# Patient Record
Sex: MALE | Race: WHITE | NOT HISPANIC OR LATINO | Employment: FULL TIME | URBAN - METROPOLITAN AREA
[De-identification: names, ages, dates, MRNs, and addresses within clinical notes are randomized per-mention and may not be internally consistent; named-entity substitution may affect disease eponyms.]

---

## 2018-01-23 ENCOUNTER — ALLSCRIPTS OFFICE VISIT (OUTPATIENT)
Dept: OTHER | Facility: OTHER | Age: 58
End: 2018-01-23

## 2018-01-25 NOTE — PROGRESS NOTES
Assessment   1  Sinus congestion (032 27) (H89 67)    Plan   Sinus congestion    · Flonase Allergy Relief 50 MCG/ACT Nasal Suspension (Fluticasone Propionate);    instill 2 sprays into each nostril once daily    Discussion/Summary      Patient is a 73-year-old male here for sinus congestion and headache times 5 days  congestion:Likely due to allergies versus viral etiology  Physical exam was benign except mildly erythematous and edematous nasal turbinates  Will start patient on Flonase, which was prescribed during this visit  Advised patient he could use steam therapy or Neti pot with saline to irrigate nasal cavity as well  RTO in 1 week  Also advised patient to scheduled an annual exam as he is just reestablishing care  The patient was counseled regarding risk factor reductions,-- patient and family education,-- importance of compliance with treatment  The treatment plan was reviewed with the patient/guardian  The patient/guardian understands and agrees with the treatment plan       Self Referrals: No      Chief Complaint   Patient presents with a sinus infection  History of Present Illness   HPI: Patient is a 73-year-old male here to re-establish care and sinus congestion  per patient, he has had symptoms for 5 days  Also complains of frontal headaches which feels pressure-like and yellowish nasal discharge  Denies any fever, sore throat, cough, runny nose, nausea, vomiting, diarrhea  Review of Systems        Constitutional: as noted in HPI,-- no fever-- and-- no chills  ENT: nasal discharge, but-- as noted in HPI,-- no earache-- and-- no sore throat  Cardiovascular: no chest pain  Respiratory: no shortness of breath,-- no cough-- and-- no wheezing  Gastrointestinal: as noted in HPI,-- no abdominal pain,-- no nausea,-- no vomiting-- and-- no diarrhea  Musculoskeletal: no myalgias  Integumentary: no rashes  Neurological: headache  ROS reviewed  Active Problems   1  Sinus congestion (478 19) (R09 81)    Current Meds    1  Restasis 0 05 % Ophthalmic Emulsion; INSTILL 1 DROP IN BOTH EYES EVERY 12     HOURS DAILY; Therapy: ([phone]) to Recorded     The medication list was reviewed and updated today  Allergies   1  No Known Drug Allergies  2  No Known Environmental Allergies    Vitals    Recorded: [id number] 09:16AM   Temperature 98 3 F   Heart Rate 681   Systolic 137   Diastolic 80   Height 5 ft 9 25 in   Weight 212 lb    BMI Calculated 31 08   BSA Calculated 2 12   O2 Saturation 94     Physical Exam        Constitutional      General appearance: No acute distress, well appearing and well nourished  Head and Face      Palpation of the face and sinuses: No sinus tenderness  Eyes      Conjunctiva and lids: No erythema, swelling or discharge  Pupils and irises: Equal, round, reactive to light  Ears, Nose, Mouth, and Throat      Otoscopic examination: Tympanic membranes translucent with normal light reflex  Canals patent without erythema  -- mild erythematous and edematous nasal turbinates  Lips, teeth, and gums: Normal, good dentition  Oropharynx: Normal with no erythema, edema, exudate or lesions  Neck      Neck: Supple, symmetric, trachea midline, no masses  Pulmonary      Auscultation of lungs: Clear to auscultation  Cardiovascular      Auscultation of heart: Normal rate and rhythm, normal S1 and S2, no murmurs  Abdomen      Abdomen: Non-tender, no masses  Musculoskeletal      Inspection/palpation of digits and nails: Normal without clubbing or cyanosis  Skin      Skin and subcutaneous tissue: Normal without rashes or lesions         Psychiatric      Mood and affect: Normal        Attending Note   Attending Note Eliz Conley: Attending Note: I did not interview and examine the patient,-- I supervised the Resident-- and-- I agree with the Resident management plan as it was presented to me  Level of Participation: I was present in clinic, but did not examine the patient  I agree with the Resident's note  Signatures    Electronically signed by : Zurdo Merida MD; Jan 24 2018  6:40PM EST                       (Author)     Electronically signed by :  SHAYNE Liu ; Jan 24 2018  7:29PM EST                       (Co-author)

## 2020-11-21 ENCOUNTER — OFFICE VISIT (OUTPATIENT)
Dept: URGENT CARE | Facility: CLINIC | Age: 60
End: 2020-11-21
Payer: COMMERCIAL

## 2020-11-21 VITALS
HEIGHT: 69 IN | WEIGHT: 204 LBS | SYSTOLIC BLOOD PRESSURE: 145 MMHG | DIASTOLIC BLOOD PRESSURE: 91 MMHG | OXYGEN SATURATION: 99 % | BODY MASS INDEX: 30.21 KG/M2 | TEMPERATURE: 97.9 F | HEART RATE: 85 BPM | RESPIRATION RATE: 18 BRPM

## 2020-11-21 DIAGNOSIS — R07.9 CHEST PAIN, UNSPECIFIED TYPE: Primary | ICD-10-CM

## 2020-11-21 PROCEDURE — 99213 OFFICE O/P EST LOW 20 MIN: CPT | Performed by: PHYSICIAN ASSISTANT

## 2020-11-21 RX ORDER — CYCLOSPORINE 0.5 MG/ML
1 EMULSION OPHTHALMIC 2 TIMES DAILY
COMMUNITY
End: 2020-11-24 | Stop reason: HOSPADM

## 2020-11-22 ENCOUNTER — HOSPITAL ENCOUNTER (OUTPATIENT)
Facility: HOSPITAL | Age: 60
Setting detail: OBSERVATION
End: 2020-11-24
Attending: EMERGENCY MEDICINE | Admitting: FAMILY MEDICINE
Payer: COMMERCIAL

## 2020-11-22 ENCOUNTER — APPOINTMENT (OUTPATIENT)
Dept: RADIOLOGY | Facility: HOSPITAL | Age: 60
End: 2020-11-22
Payer: COMMERCIAL

## 2020-11-22 ENCOUNTER — APPOINTMENT (EMERGENCY)
Dept: RADIOLOGY | Facility: HOSPITAL | Age: 60
End: 2020-11-22
Payer: COMMERCIAL

## 2020-11-22 DIAGNOSIS — R77.8 ELEVATED TROPONIN: ICD-10-CM

## 2020-11-22 DIAGNOSIS — I21.4 NSTEMI (NON-ST ELEVATION MYOCARDIAL INFARCTION) (HCC): ICD-10-CM

## 2020-11-22 DIAGNOSIS — R07.9 CHEST PAIN: Primary | ICD-10-CM

## 2020-11-22 PROBLEM — E78.5 HYPERLIPIDEMIA: Status: ACTIVE | Noted: 2020-11-22

## 2020-11-22 LAB
ALBUMIN SERPL BCP-MCNC: 3.8 G/DL (ref 3.5–5)
ALP SERPL-CCNC: 58 U/L (ref 46–116)
ALT SERPL W P-5'-P-CCNC: 57 U/L (ref 12–78)
ANION GAP SERPL CALCULATED.3IONS-SCNC: 9 MMOL/L (ref 4–13)
AST SERPL W P-5'-P-CCNC: 23 U/L (ref 5–45)
BASOPHILS # BLD AUTO: 0.08 THOUSANDS/ΜL (ref 0–0.1)
BASOPHILS NFR BLD AUTO: 1 % (ref 0–1)
BILIRUB SERPL-MCNC: 0.5 MG/DL (ref 0.2–1)
BUN SERPL-MCNC: 12 MG/DL (ref 5–25)
CALCIUM SERPL-MCNC: 8.9 MG/DL (ref 8.3–10.1)
CHLORIDE SERPL-SCNC: 103 MMOL/L (ref 100–108)
CO2 SERPL-SCNC: 29 MMOL/L (ref 21–32)
CREAT SERPL-MCNC: 1.16 MG/DL (ref 0.6–1.3)
EOSINOPHIL # BLD AUTO: 0.12 THOUSAND/ΜL (ref 0–0.61)
EOSINOPHIL NFR BLD AUTO: 1 % (ref 0–6)
ERYTHROCYTE [DISTWIDTH] IN BLOOD BY AUTOMATED COUNT: 12.3 % (ref 11.6–15.1)
GFR SERPL CREATININE-BSD FRML MDRD: 70 ML/MIN/1.73SQ M
GLUCOSE SERPL-MCNC: 125 MG/DL (ref 65–140)
HCT VFR BLD AUTO: 48.1 % (ref 36.5–49.3)
HGB BLD-MCNC: 16.2 G/DL (ref 12–17)
IMM GRANULOCYTES # BLD AUTO: 0.04 THOUSAND/UL (ref 0–0.2)
IMM GRANULOCYTES NFR BLD AUTO: 1 % (ref 0–2)
LYMPHOCYTES # BLD AUTO: 2.48 THOUSANDS/ΜL (ref 0.6–4.47)
LYMPHOCYTES NFR BLD AUTO: 29 % (ref 14–44)
MCH RBC QN AUTO: 30.6 PG (ref 26.8–34.3)
MCHC RBC AUTO-ENTMCNC: 33.7 G/DL (ref 31.4–37.4)
MCV RBC AUTO: 91 FL (ref 82–98)
MONOCYTES # BLD AUTO: 0.92 THOUSAND/ΜL (ref 0.17–1.22)
MONOCYTES NFR BLD AUTO: 11 % (ref 4–12)
NEUTROPHILS # BLD AUTO: 5.04 THOUSANDS/ΜL (ref 1.85–7.62)
NEUTS SEG NFR BLD AUTO: 57 % (ref 43–75)
NRBC BLD AUTO-RTO: 0 /100 WBCS
NT-PROBNP SERPL-MCNC: 231 PG/ML
PLATELET # BLD AUTO: 314 THOUSANDS/UL (ref 149–390)
PMV BLD AUTO: 9.2 FL (ref 8.9–12.7)
POTASSIUM SERPL-SCNC: 3.6 MMOL/L (ref 3.5–5.3)
PROT SERPL-MCNC: 7.3 G/DL (ref 6.4–8.2)
RBC # BLD AUTO: 5.3 MILLION/UL (ref 3.88–5.62)
SODIUM SERPL-SCNC: 141 MMOL/L (ref 136–145)
TROPONIN I SERPL-MCNC: 0.08 NG/ML
TROPONIN I SERPL-MCNC: 0.16 NG/ML
WBC # BLD AUTO: 8.68 THOUSAND/UL (ref 4.31–10.16)

## 2020-11-22 PROCEDURE — 83036 HEMOGLOBIN GLYCOSYLATED A1C: CPT | Performed by: STUDENT IN AN ORGANIZED HEALTH CARE EDUCATION/TRAINING PROGRAM

## 2020-11-22 PROCEDURE — 80053 COMPREHEN METABOLIC PANEL: CPT

## 2020-11-22 PROCEDURE — 71275 CT ANGIOGRAPHY CHEST: CPT

## 2020-11-22 PROCEDURE — 93005 ELECTROCARDIOGRAM TRACING: CPT

## 2020-11-22 PROCEDURE — 84484 ASSAY OF TROPONIN QUANT: CPT

## 2020-11-22 PROCEDURE — G1004 CDSM NDSC: HCPCS

## 2020-11-22 PROCEDURE — 99285 EMERGENCY DEPT VISIT HI MDM: CPT

## 2020-11-22 PROCEDURE — 84484 ASSAY OF TROPONIN QUANT: CPT | Performed by: EMERGENCY MEDICINE

## 2020-11-22 PROCEDURE — 85025 COMPLETE CBC W/AUTO DIFF WBC: CPT

## 2020-11-22 PROCEDURE — 74174 CTA ABD&PLVS W/CONTRAST: CPT

## 2020-11-22 PROCEDURE — 71045 X-RAY EXAM CHEST 1 VIEW: CPT

## 2020-11-22 PROCEDURE — 36415 COLL VENOUS BLD VENIPUNCTURE: CPT

## 2020-11-22 PROCEDURE — 99285 EMERGENCY DEPT VISIT HI MDM: CPT | Performed by: EMERGENCY MEDICINE

## 2020-11-22 PROCEDURE — 83880 ASSAY OF NATRIURETIC PEPTIDE: CPT

## 2020-11-22 RX ORDER — ASPIRIN 81 MG/1
324 TABLET, CHEWABLE ORAL ONCE
Status: COMPLETED | OUTPATIENT
Start: 2020-11-22 | End: 2020-11-22

## 2020-11-22 RX ORDER — NITROGLYCERIN 0.4 MG/1
0.4 TABLET SUBLINGUAL
Status: COMPLETED | OUTPATIENT
Start: 2020-11-22 | End: 2020-11-23

## 2020-11-22 RX ORDER — ASPIRIN 81 MG/1
81 TABLET, CHEWABLE ORAL DAILY
COMMUNITY
End: 2020-11-24 | Stop reason: HOSPADM

## 2020-11-22 RX ADMIN — IOHEXOL 100 ML: 350 INJECTION, SOLUTION INTRAVENOUS at 22:34

## 2020-11-22 RX ADMIN — ASPIRIN 81 MG CHEWABLE TABLET 324 MG: 81 TABLET CHEWABLE at 19:37

## 2020-11-22 RX ADMIN — NITROGLYCERIN 0.4 MG: 0.4 TABLET SUBLINGUAL at 19:39

## 2020-11-22 RX ADMIN — NITROGLYCERIN 1 INCH: 20 OINTMENT TOPICAL at 20:08

## 2020-11-23 ENCOUNTER — APPOINTMENT (OUTPATIENT)
Dept: NON INVASIVE DIAGNOSTICS | Facility: HOSPITAL | Age: 60
End: 2020-11-23
Payer: COMMERCIAL

## 2020-11-23 LAB
ALBUMIN SERPL BCP-MCNC: 3.3 G/DL (ref 3.5–5)
ALP SERPL-CCNC: 49 U/L (ref 46–116)
ALT SERPL W P-5'-P-CCNC: 42 U/L (ref 12–78)
ANION GAP SERPL CALCULATED.3IONS-SCNC: 8 MMOL/L (ref 4–13)
AST SERPL W P-5'-P-CCNC: 21 U/L (ref 5–45)
ATRIAL RATE: 67 BPM
ATRIAL RATE: 90 BPM
BILIRUB SERPL-MCNC: 0.7 MG/DL (ref 0.2–1)
BUN SERPL-MCNC: 10 MG/DL (ref 5–25)
CALCIUM ALBUM COR SERPL-MCNC: 9.2 MG/DL (ref 8.3–10.1)
CALCIUM SERPL-MCNC: 8.6 MG/DL (ref 8.3–10.1)
CHLORIDE SERPL-SCNC: 105 MMOL/L (ref 100–108)
CHOLEST SERPL-MCNC: 252 MG/DL (ref 50–200)
CO2 SERPL-SCNC: 26 MMOL/L (ref 21–32)
CREAT SERPL-MCNC: 1.04 MG/DL (ref 0.6–1.3)
ERYTHROCYTE [DISTWIDTH] IN BLOOD BY AUTOMATED COUNT: 12.5 % (ref 11.6–15.1)
EST. AVERAGE GLUCOSE BLD GHB EST-MCNC: 117 MG/DL
FLUAV RNA RESP QL NAA+PROBE: NEGATIVE
FLUBV RNA RESP QL NAA+PROBE: NEGATIVE
GFR SERPL CREATININE-BSD FRML MDRD: 79 ML/MIN/1.73SQ M
GLUCOSE P FAST SERPL-MCNC: 119 MG/DL (ref 65–99)
GLUCOSE SERPL-MCNC: 119 MG/DL (ref 65–140)
HBA1C MFR BLD: 5.7 %
HCT VFR BLD AUTO: 44.1 % (ref 36.5–49.3)
HDLC SERPL-MCNC: 43 MG/DL
HGB BLD-MCNC: 14.8 G/DL (ref 12–17)
LDLC SERPL CALC-MCNC: 166 MG/DL (ref 0–100)
MAGNESIUM SERPL-MCNC: 2.2 MG/DL (ref 1.6–2.6)
MCH RBC QN AUTO: 30.8 PG (ref 26.8–34.3)
MCHC RBC AUTO-ENTMCNC: 33.6 G/DL (ref 31.4–37.4)
MCV RBC AUTO: 92 FL (ref 82–98)
NONHDLC SERPL-MCNC: 209 MG/DL
P AXIS: 61 DEGREES
P AXIS: 63 DEGREES
PHOSPHATE SERPL-MCNC: 3.3 MG/DL (ref 2.7–4.5)
PLATELET # BLD AUTO: 296 THOUSANDS/UL (ref 149–390)
PLATELET # BLD AUTO: 302 THOUSANDS/UL (ref 149–390)
PMV BLD AUTO: 9.1 FL (ref 8.9–12.7)
PMV BLD AUTO: 9.4 FL (ref 8.9–12.7)
POTASSIUM SERPL-SCNC: 3.8 MMOL/L (ref 3.5–5.3)
PR INTERVAL: 154 MS
PR INTERVAL: 158 MS
PROT SERPL-MCNC: 6.5 G/DL (ref 6.4–8.2)
QRS AXIS: -13 DEGREES
QRS AXIS: 4 DEGREES
QRSD INTERVAL: 110 MS
QRSD INTERVAL: 110 MS
QT INTERVAL: 364 MS
QT INTERVAL: 410 MS
QTC INTERVAL: 433 MS
QTC INTERVAL: 445 MS
RBC # BLD AUTO: 4.8 MILLION/UL (ref 3.88–5.62)
RSV RNA RESP QL NAA+PROBE: NEGATIVE
SARS-COV-2 RNA RESP QL NAA+PROBE: NEGATIVE
SODIUM SERPL-SCNC: 139 MMOL/L (ref 136–145)
T WAVE AXIS: -26 DEGREES
T WAVE AXIS: -32 DEGREES
TRIGL SERPL-MCNC: 214 MG/DL
TROPONIN I SERPL-MCNC: 0.07 NG/ML
TROPONIN I SERPL-MCNC: 0.11 NG/ML
TROPONIN I SERPL-MCNC: 0.16 NG/ML
TSH SERPL DL<=0.05 MIU/L-ACNC: 3.6 UIU/ML (ref 0.36–3.74)
VENTRICULAR RATE: 67 BPM
VENTRICULAR RATE: 90 BPM
WBC # BLD AUTO: 10.23 THOUSAND/UL (ref 4.31–10.16)

## 2020-11-23 PROCEDURE — 93005 ELECTROCARDIOGRAM TRACING: CPT

## 2020-11-23 PROCEDURE — 99203 OFFICE O/P NEW LOW 30 MIN: CPT | Performed by: INTERNAL MEDICINE

## 2020-11-23 PROCEDURE — 93010 ELECTROCARDIOGRAM REPORT: CPT | Performed by: INTERNAL MEDICINE

## 2020-11-23 PROCEDURE — NC001 PR NO CHARGE: Performed by: FAMILY MEDICINE

## 2020-11-23 PROCEDURE — 93306 TTE W/DOPPLER COMPLETE: CPT

## 2020-11-23 PROCEDURE — 0241U HB NFCT DS VIR RESP RNA 4 TRGT: CPT | Performed by: INTERNAL MEDICINE

## 2020-11-23 PROCEDURE — 84100 ASSAY OF PHOSPHORUS: CPT | Performed by: STUDENT IN AN ORGANIZED HEALTH CARE EDUCATION/TRAINING PROGRAM

## 2020-11-23 PROCEDURE — 99152 MOD SED SAME PHYS/QHP 5/>YRS: CPT | Performed by: INTERNAL MEDICINE

## 2020-11-23 PROCEDURE — 84443 ASSAY THYROID STIM HORMONE: CPT | Performed by: STUDENT IN AN ORGANIZED HEALTH CARE EDUCATION/TRAINING PROGRAM

## 2020-11-23 PROCEDURE — 84484 ASSAY OF TROPONIN QUANT: CPT | Performed by: GENERAL PRACTICE

## 2020-11-23 PROCEDURE — C1769 GUIDE WIRE: HCPCS

## 2020-11-23 PROCEDURE — 80061 LIPID PANEL: CPT | Performed by: STUDENT IN AN ORGANIZED HEALTH CARE EDUCATION/TRAINING PROGRAM

## 2020-11-23 PROCEDURE — 80053 COMPREHEN METABOLIC PANEL: CPT | Performed by: STUDENT IN AN ORGANIZED HEALTH CARE EDUCATION/TRAINING PROGRAM

## 2020-11-23 PROCEDURE — C1894 INTRO/SHEATH, NON-LASER: HCPCS

## 2020-11-23 PROCEDURE — 84484 ASSAY OF TROPONIN QUANT: CPT | Performed by: FAMILY MEDICINE

## 2020-11-23 PROCEDURE — 93458 L HRT ARTERY/VENTRICLE ANGIO: CPT

## 2020-11-23 PROCEDURE — 83735 ASSAY OF MAGNESIUM: CPT | Performed by: STUDENT IN AN ORGANIZED HEALTH CARE EDUCATION/TRAINING PROGRAM

## 2020-11-23 PROCEDURE — 93458 L HRT ARTERY/VENTRICLE ANGIO: CPT | Performed by: INTERNAL MEDICINE

## 2020-11-23 PROCEDURE — 85049 AUTOMATED PLATELET COUNT: CPT | Performed by: STUDENT IN AN ORGANIZED HEALTH CARE EDUCATION/TRAINING PROGRAM

## 2020-11-23 PROCEDURE — 99219 PR INITIAL OBSERVATION CARE/DAY 50 MINUTES: CPT | Performed by: FAMILY MEDICINE

## 2020-11-23 PROCEDURE — 93306 TTE W/DOPPLER COMPLETE: CPT | Performed by: INTERNAL MEDICINE

## 2020-11-23 PROCEDURE — 85027 COMPLETE CBC AUTOMATED: CPT | Performed by: STUDENT IN AN ORGANIZED HEALTH CARE EDUCATION/TRAINING PROGRAM

## 2020-11-23 PROCEDURE — 84484 ASSAY OF TROPONIN QUANT: CPT | Performed by: STUDENT IN AN ORGANIZED HEALTH CARE EDUCATION/TRAINING PROGRAM

## 2020-11-23 RX ORDER — ACETAMINOPHEN 325 MG/1
650 TABLET ORAL EVERY 6 HOURS PRN
Status: DISCONTINUED | OUTPATIENT
Start: 2020-11-23 | End: 2020-11-23

## 2020-11-23 RX ORDER — HEPARIN SODIUM 1000 [USP'U]/ML
INJECTION, SOLUTION INTRAVENOUS; SUBCUTANEOUS CODE/TRAUMA/SEDATION MEDICATION
Status: COMPLETED | OUTPATIENT
Start: 2020-11-23 | End: 2020-11-23

## 2020-11-23 RX ORDER — ACETAMINOPHEN 325 MG/1
650 TABLET ORAL EVERY 6 HOURS PRN
Status: DISCONTINUED | OUTPATIENT
Start: 2020-11-23 | End: 2020-11-24 | Stop reason: HOSPADM

## 2020-11-23 RX ORDER — ATORVASTATIN CALCIUM 40 MG/1
40 TABLET, FILM COATED ORAL
Status: DISCONTINUED | OUTPATIENT
Start: 2020-11-23 | End: 2020-11-23

## 2020-11-23 RX ORDER — NITROGLYCERIN 0.4 MG/1
0.4 TABLET SUBLINGUAL
Status: COMPLETED | OUTPATIENT
Start: 2020-11-23 | End: 2020-11-23

## 2020-11-23 RX ORDER — POTASSIUM CHLORIDE 20 MEQ/1
20 TABLET, EXTENDED RELEASE ORAL ONCE
Status: COMPLETED | OUTPATIENT
Start: 2020-11-23 | End: 2020-11-23

## 2020-11-23 RX ORDER — FENTANYL CITRATE 50 UG/ML
INJECTION, SOLUTION INTRAMUSCULAR; INTRAVENOUS CODE/TRAUMA/SEDATION MEDICATION
Status: COMPLETED | OUTPATIENT
Start: 2020-11-23 | End: 2020-11-23

## 2020-11-23 RX ORDER — SODIUM CHLORIDE 9 MG/ML
75 INJECTION, SOLUTION INTRAVENOUS CONTINUOUS
Status: DISCONTINUED | OUTPATIENT
Start: 2020-11-23 | End: 2020-11-24 | Stop reason: HOSPADM

## 2020-11-23 RX ORDER — NITROGLYCERIN 20 MG/100ML
INJECTION INTRAVENOUS CODE/TRAUMA/SEDATION MEDICATION
Status: COMPLETED | OUTPATIENT
Start: 2020-11-23 | End: 2020-11-23

## 2020-11-23 RX ORDER — ASPIRIN 81 MG/1
81 TABLET ORAL DAILY
Status: DISCONTINUED | OUTPATIENT
Start: 2020-11-23 | End: 2020-11-24 | Stop reason: HOSPADM

## 2020-11-23 RX ORDER — MIDAZOLAM HYDROCHLORIDE 2 MG/2ML
INJECTION, SOLUTION INTRAMUSCULAR; INTRAVENOUS CODE/TRAUMA/SEDATION MEDICATION
Status: COMPLETED | OUTPATIENT
Start: 2020-11-23 | End: 2020-11-23

## 2020-11-23 RX ORDER — PRASUGREL 10 MG/1
10 TABLET, FILM COATED ORAL DAILY
Status: DISCONTINUED | OUTPATIENT
Start: 2020-11-24 | End: 2020-11-24 | Stop reason: HOSPADM

## 2020-11-23 RX ORDER — VERAPAMIL HCL 2.5 MG/ML
AMPUL (ML) INTRAVENOUS CODE/TRAUMA/SEDATION MEDICATION
Status: COMPLETED | OUTPATIENT
Start: 2020-11-23 | End: 2020-11-23

## 2020-11-23 RX ORDER — PRASUGREL 10 MG/1
TABLET, FILM COATED ORAL CODE/TRAUMA/SEDATION MEDICATION
Status: COMPLETED | OUTPATIENT
Start: 2020-11-23 | End: 2020-11-23

## 2020-11-23 RX ORDER — LIDOCAINE HYDROCHLORIDE 20 MG/ML
INJECTION, SOLUTION INFILTRATION; PERINEURAL CODE/TRAUMA/SEDATION MEDICATION
Status: COMPLETED | OUTPATIENT
Start: 2020-11-23 | End: 2020-11-23

## 2020-11-23 RX ORDER — ATORVASTATIN CALCIUM 80 MG/1
80 TABLET, FILM COATED ORAL
Status: DISCONTINUED | OUTPATIENT
Start: 2020-11-23 | End: 2020-11-24 | Stop reason: HOSPADM

## 2020-11-23 RX ADMIN — ASPIRIN 81 MG: 81 TABLET, DELAYED RELEASE ORAL at 11:04

## 2020-11-23 RX ADMIN — Medication 200 MCG: at 14:38

## 2020-11-23 RX ADMIN — ENOXAPARIN SODIUM 40 MG: 40 INJECTION SUBCUTANEOUS at 09:34

## 2020-11-23 RX ADMIN — FENTANYL CITRATE 100 MCG: 50 INJECTION, SOLUTION INTRAMUSCULAR; INTRAVENOUS at 14:37

## 2020-11-23 RX ADMIN — METOPROLOL TARTRATE 25 MG: 25 TABLET, FILM COATED ORAL at 11:05

## 2020-11-23 RX ADMIN — NITROGLYCERIN 0.4 MG: 0.4 TABLET SUBLINGUAL at 11:17

## 2020-11-23 RX ADMIN — NITROGLYCERIN 0.4 MG: 0.4 TABLET SUBLINGUAL at 07:41

## 2020-11-23 RX ADMIN — LIDOCAINE HYDROCHLORIDE 1 ML: 20 INJECTION, SOLUTION INFILTRATION; PERINEURAL at 14:37

## 2020-11-23 RX ADMIN — ATORVASTATIN CALCIUM 80 MG: 80 TABLET ORAL at 17:08

## 2020-11-23 RX ADMIN — IOHEXOL 75 ML: 350 INJECTION, SOLUTION INTRAVENOUS at 15:01

## 2020-11-23 RX ADMIN — MIDAZOLAM 2 MG: 1 INJECTION INTRAMUSCULAR; INTRAVENOUS at 14:37

## 2020-11-23 RX ADMIN — POTASSIUM CHLORIDE 20 MEQ: 1500 TABLET, EXTENDED RELEASE ORAL at 07:41

## 2020-11-23 RX ADMIN — ACETAMINOPHEN 650 MG: 325 TABLET, FILM COATED ORAL at 21:46

## 2020-11-23 RX ADMIN — PRASUGREL HYDROCHLORIDE 60 MG: 10 TABLET, FILM COATED ORAL at 15:06

## 2020-11-23 RX ADMIN — METOPROLOL TARTRATE 25 MG: 25 TABLET, FILM COATED ORAL at 22:45

## 2020-11-23 RX ADMIN — NITROGLYCERIN 0.4 MG: 0.4 TABLET SUBLINGUAL at 21:20

## 2020-11-23 RX ADMIN — NITROGLYCERIN 0.4 MG: 0.4 TABLET SUBLINGUAL at 21:26

## 2020-11-23 RX ADMIN — SODIUM CHLORIDE 75 ML/HR: 0.9 INJECTION, SOLUTION INTRAVENOUS at 11:04

## 2020-11-23 RX ADMIN — ENOXAPARIN SODIUM 80 MG: 80 INJECTION SUBCUTANEOUS at 21:39

## 2020-11-23 RX ADMIN — NITROGLYCERIN 0.4 MG: 0.4 TABLET SUBLINGUAL at 13:41

## 2020-11-23 RX ADMIN — HEPARIN SODIUM 3000 UNITS: 1000 INJECTION, SOLUTION INTRAVENOUS; SUBCUTANEOUS at 14:38

## 2020-11-23 RX ADMIN — VERAPAMIL HYDROCHLORIDE 2.5 MG: 2.5 INJECTION, SOLUTION INTRAVENOUS at 14:38

## 2020-11-24 ENCOUNTER — HOSPITAL ENCOUNTER (INPATIENT)
Facility: HOSPITAL | Age: 60
LOS: 1 days | Discharge: HOME | DRG: 247 | End: 2020-11-25
Attending: INTERNAL MEDICINE | Admitting: INTERNAL MEDICINE
Payer: COMMERCIAL

## 2020-11-24 ENCOUNTER — APPOINTMENT (OUTPATIENT)
Dept: NON INVASIVE DIAGNOSTICS | Facility: HOSPITAL | Age: 60
DRG: 247 | End: 2020-11-24
Attending: INTERNAL MEDICINE
Payer: COMMERCIAL

## 2020-11-24 VITALS
WEIGHT: 206.8 LBS | RESPIRATION RATE: 19 BRPM | OXYGEN SATURATION: 98 % | TEMPERATURE: 98.3 F | BODY MASS INDEX: 30.63 KG/M2 | SYSTOLIC BLOOD PRESSURE: 145 MMHG | DIASTOLIC BLOOD PRESSURE: 99 MMHG | HEART RATE: 60 BPM | HEIGHT: 69 IN

## 2020-11-24 DIAGNOSIS — I21.4 NSTEMI (NON-ST ELEVATED MYOCARDIAL INFARCTION) (HCC): ICD-10-CM

## 2020-11-24 DIAGNOSIS — Z95.5 S/P CORONARY ARTERY STENT PLACEMENT: Primary | ICD-10-CM

## 2020-11-24 DIAGNOSIS — E78.2 MIXED HYPERLIPIDEMIA: ICD-10-CM

## 2020-11-24 PROBLEM — R03.0 ELEVATED BP WITHOUT DIAGNOSIS OF HYPERTENSION: Status: ACTIVE | Noted: 2020-11-24

## 2020-11-24 LAB
ALBUMIN SERPL BCP-MCNC: 3.2 G/DL (ref 3.5–5)
ALP SERPL-CCNC: 51 U/L (ref 46–116)
ALT SERPL W P-5'-P-CCNC: 42 U/L (ref 12–78)
ANION GAP SERPL CALCULATED.3IONS-SCNC: 8 MMOL/L (ref 4–13)
AST SERPL W P-5'-P-CCNC: 33 U/L (ref 5–45)
ATRIAL RATE: 78 BPM
ATRIAL RATE: 83 BPM
BASOPHILS # BLD AUTO: 0.07 THOUSANDS/ΜL (ref 0–0.1)
BASOPHILS NFR BLD AUTO: 1 % (ref 0–1)
BILIRUB SERPL-MCNC: 0.7 MG/DL (ref 0.2–1)
BUN SERPL-MCNC: 9 MG/DL (ref 5–25)
CALCIUM ALBUM COR SERPL-MCNC: 9 MG/DL (ref 8.3–10.1)
CALCIUM SERPL-MCNC: 8.4 MG/DL (ref 8.3–10.1)
CHLORIDE SERPL-SCNC: 106 MMOL/L (ref 100–108)
CHOLEST SERPL-MCNC: 243 MG/DL (ref 50–200)
CO2 SERPL-SCNC: 26 MMOL/L (ref 21–32)
CREAT SERPL-MCNC: 0.88 MG/DL (ref 0.6–1.3)
EOSINOPHIL # BLD AUTO: 0.12 THOUSAND/ΜL (ref 0–0.61)
EOSINOPHIL NFR BLD AUTO: 1 % (ref 0–6)
ERYTHROCYTE [DISTWIDTH] IN BLOOD BY AUTOMATED COUNT: 12.3 % (ref 11.6–15.1)
GFR SERPL CREATININE-BSD FRML MDRD: 95 ML/MIN/1.73SQ M
GLUCOSE SERPL-MCNC: 121 MG/DL (ref 65–140)
HCT VFR BLD AUTO: 45.3 % (ref 36.5–49.3)
HDLC SERPL-MCNC: 40 MG/DL
HGB BLD-MCNC: 15.2 G/DL (ref 12–17)
IMM GRANULOCYTES # BLD AUTO: 0.04 THOUSAND/UL (ref 0–0.2)
IMM GRANULOCYTES NFR BLD AUTO: 0 % (ref 0–2)
KCT BLD-ACNC: 293 SEC (ref 89–137)
LDLC SERPL CALC-MCNC: 158 MG/DL (ref 0–100)
LYMPHOCYTES # BLD AUTO: 2.1 THOUSANDS/ΜL (ref 0.6–4.47)
LYMPHOCYTES NFR BLD AUTO: 21 % (ref 14–44)
MAGNESIUM SERPL-MCNC: 2.1 MG/DL (ref 1.6–2.6)
MCH RBC QN AUTO: 30.7 PG (ref 26.8–34.3)
MCHC RBC AUTO-ENTMCNC: 33.6 G/DL (ref 31.4–37.4)
MCV RBC AUTO: 92 FL (ref 82–98)
MONOCYTES # BLD AUTO: 0.98 THOUSAND/ΜL (ref 0.17–1.22)
MONOCYTES NFR BLD AUTO: 10 % (ref 4–12)
NEUTROPHILS # BLD AUTO: 6.72 THOUSANDS/ΜL (ref 1.85–7.62)
NEUTS SEG NFR BLD AUTO: 67 % (ref 43–75)
NRBC BLD AUTO-RTO: 0 /100 WBCS
P AXIS: 50 DEGREES
P AXIS: 55 DEGREES
PHOSPHATE SERPL-MCNC: 3.1 MG/DL (ref 2.7–4.5)
PLATELET # BLD AUTO: 297 THOUSANDS/UL (ref 149–390)
PMV BLD AUTO: 9.3 FL (ref 8.9–12.7)
POTASSIUM SERPL-SCNC: 3.9 MMOL/L (ref 3.5–5.3)
PR INTERVAL: 158 MS
PR INTERVAL: 162 MS
PROT SERPL-MCNC: 6.5 G/DL (ref 6.4–8.2)
QRS AXIS: -13 DEGREES
QRS AXIS: -8 DEGREES
QRSD INTERVAL: 102 MS
QRSD INTERVAL: 108 MS
QT INTERVAL: 270 MS
QT INTERVAL: 378 MS
QTC INTERVAL: 307 MS
QTC INTERVAL: 444 MS
RBC # BLD AUTO: 4.95 MILLION/UL (ref 3.88–5.62)
SODIUM SERPL-SCNC: 140 MMOL/L (ref 136–145)
SPECIMEN SOURCE: ABNORMAL
T WAVE AXIS: -57 DEGREES
T WAVE AXIS: 31 DEGREES
TRIGL SERPL-MCNC: 227 MG/DL
VENTRICULAR RATE: 78 BPM
VENTRICULAR RATE: 83 BPM
WBC # BLD AUTO: 10.03 THOUSAND/UL (ref 4.31–10.16)

## 2020-11-24 PROCEDURE — C9600 PERC DRUG-EL COR STENT SING: HCPCS | Performed by: INTERNAL MEDICINE

## 2020-11-24 PROCEDURE — 85025 COMPLETE CBC W/AUTO DIFF WBC: CPT | Performed by: GENERAL PRACTICE

## 2020-11-24 PROCEDURE — C1894 INTRO/SHEATH, NON-LASER: HCPCS | Performed by: INTERNAL MEDICINE

## 2020-11-24 PROCEDURE — C1769 GUIDE WIRE: HCPCS | Performed by: INTERNAL MEDICINE

## 2020-11-24 PROCEDURE — 80061 LIPID PANEL: CPT | Performed by: GENERAL PRACTICE

## 2020-11-24 PROCEDURE — 99232 SBSQ HOSP IP/OBS MODERATE 35: CPT | Performed by: INTERNAL MEDICINE

## 2020-11-24 PROCEDURE — 93454 CORONARY ARTERY ANGIO S&I: CPT | Performed by: INTERNAL MEDICINE

## 2020-11-24 PROCEDURE — 84100 ASSAY OF PHOSPHORUS: CPT | Performed by: GENERAL PRACTICE

## 2020-11-24 PROCEDURE — C1887 CATHETER, GUIDING: HCPCS | Performed by: INTERNAL MEDICINE

## 2020-11-24 PROCEDURE — 027034Z DILATION OF CORONARY ARTERY, ONE ARTERY WITH DRUG-ELUTING INTRALUMINAL DEVICE, PERCUTANEOUS APPROACH: ICD-10-PCS | Performed by: INTERNAL MEDICINE

## 2020-11-24 PROCEDURE — C1874 STENT, COATED/COV W/DEL SYS: HCPCS

## 2020-11-24 PROCEDURE — B2101ZZ FLUOROSCOPY OF SINGLE CORONARY ARTERY USING LOW OSMOLAR CONTRAST: ICD-10-PCS | Performed by: INTERNAL MEDICINE

## 2020-11-24 PROCEDURE — 85347 COAGULATION TIME ACTIVATED: CPT

## 2020-11-24 PROCEDURE — 83735 ASSAY OF MAGNESIUM: CPT | Performed by: GENERAL PRACTICE

## 2020-11-24 PROCEDURE — 92928 PRQ TCAT PLMT NTRAC ST 1 LES: CPT | Performed by: INTERNAL MEDICINE

## 2020-11-24 PROCEDURE — 99152 MOD SED SAME PHYS/QHP 5/>YRS: CPT | Performed by: INTERNAL MEDICINE

## 2020-11-24 PROCEDURE — 99217 PR OBSERVATION CARE DISCHARGE MANAGEMENT: CPT | Performed by: FAMILY MEDICINE

## 2020-11-24 PROCEDURE — 80053 COMPREHEN METABOLIC PANEL: CPT | Performed by: GENERAL PRACTICE

## 2020-11-24 PROCEDURE — 93010 ELECTROCARDIOGRAM REPORT: CPT | Performed by: INTERNAL MEDICINE

## 2020-11-24 PROCEDURE — 99223 1ST HOSP IP/OBS HIGH 75: CPT | Performed by: INTERNAL MEDICINE

## 2020-11-24 RX ORDER — SODIUM CHLORIDE 9 MG/ML
INJECTION, SOLUTION INTRAVENOUS
Status: COMPLETED | OUTPATIENT
Start: 2020-11-24 | End: 2020-11-24

## 2020-11-24 RX ORDER — ATORVASTATIN CALCIUM 80 MG/1
80 TABLET, FILM COATED ORAL
Qty: 90 TABLET | Refills: 0 | Status: SHIPPED | OUTPATIENT
Start: 2020-11-24 | End: 2020-12-14 | Stop reason: SDUPTHER

## 2020-11-24 RX ORDER — PRASUGREL 10 MG/1
10 TABLET, FILM COATED ORAL DAILY
Qty: 30 TABLET | Refills: 0 | Status: SHIPPED | OUTPATIENT
Start: 2020-11-25 | End: 2020-12-14 | Stop reason: SDUPTHER

## 2020-11-24 RX ORDER — VERAPAMIL HYDROCHLORIDE 2.5 MG/ML
INJECTION, SOLUTION INTRAVENOUS CODE/TRAUMA/SEDATION MEDICATION
Status: COMPLETED | OUTPATIENT
Start: 2020-11-24 | End: 2020-11-24

## 2020-11-24 RX ORDER — ASPIRIN 81 MG/1
81 TABLET ORAL DAILY
Qty: 90 TABLET | Refills: 0 | Status: SHIPPED | OUTPATIENT
Start: 2020-11-25 | End: 2020-11-25 | Stop reason: HOSPADM

## 2020-11-24 RX ORDER — ASPIRIN 81 MG/1
TABLET, CHEWABLE ORAL CODE/TRAUMA/SEDATION MEDICATION
Status: COMPLETED | OUTPATIENT
Start: 2020-11-24 | End: 2020-11-24

## 2020-11-24 RX ORDER — ACETAMINOPHEN 325 MG/1
650 TABLET ORAL EVERY 6 HOURS PRN
Status: DISCONTINUED | OUTPATIENT
Start: 2020-11-24 | End: 2020-11-25 | Stop reason: HOSPADM

## 2020-11-24 RX ORDER — HEPARIN SODIUM 1000 [USP'U]/ML
INJECTION, SOLUTION INTRAVENOUS; SUBCUTANEOUS CODE/TRAUMA/SEDATION MEDICATION
Status: COMPLETED | OUTPATIENT
Start: 2020-11-24 | End: 2020-11-24

## 2020-11-24 RX ORDER — SODIUM CHLORIDE 9 MG/ML
75 INJECTION, SOLUTION INTRAVENOUS CONTINUOUS
Status: DISPENSED | OUTPATIENT
Start: 2020-11-24 | End: 2020-11-24

## 2020-11-24 RX ORDER — FENTANYL CITRATE 50 UG/ML
INJECTION, SOLUTION INTRAMUSCULAR; INTRAVENOUS CODE/TRAUMA/SEDATION MEDICATION
Status: COMPLETED | OUTPATIENT
Start: 2020-11-24 | End: 2020-11-24

## 2020-11-24 RX ORDER — ONDANSETRON 2 MG/ML
4 INJECTION INTRAMUSCULAR; INTRAVENOUS EVERY 6 HOURS PRN
Status: DISCONTINUED | OUTPATIENT
Start: 2020-11-24 | End: 2020-11-25 | Stop reason: HOSPADM

## 2020-11-24 RX ORDER — PRASUGREL 10 MG/1
10 TABLET, FILM COATED ORAL DAILY
Qty: 90 TABLET | Refills: 0 | Status: SHIPPED | OUTPATIENT
Start: 2020-11-24 | End: 2020-11-25 | Stop reason: HOSPADM

## 2020-11-24 RX ORDER — NITROGLYCERIN 0.4 MG/1
0.4 TABLET SUBLINGUAL
Qty: 30 TABLET | Refills: 0 | Status: SHIPPED | OUTPATIENT
Start: 2020-11-24 | End: 2020-12-01

## 2020-11-24 RX ORDER — PRASUGREL 10 MG/1
10 TABLET, FILM COATED ORAL DAILY
Status: DISCONTINUED | OUTPATIENT
Start: 2020-11-25 | End: 2020-11-25 | Stop reason: HOSPADM

## 2020-11-24 RX ORDER — ASPIRIN 81 MG/1
162 TABLET, CHEWABLE ORAL DAILY
Status: DISCONTINUED | OUTPATIENT
Start: 2020-11-24 | End: 2020-11-25 | Stop reason: HOSPADM

## 2020-11-24 RX ORDER — NITROGLYCERIN 20 MG/100ML
INJECTION INTRAVENOUS CODE/TRAUMA/SEDATION MEDICATION
Status: COMPLETED | OUTPATIENT
Start: 2020-11-24 | End: 2020-11-24

## 2020-11-24 RX ORDER — ACETAMINOPHEN 325 MG/1
650 TABLET ORAL EVERY 6 HOURS PRN
Status: DISCONTINUED | OUTPATIENT
Start: 2020-11-24 | End: 2020-11-24

## 2020-11-24 RX ORDER — LIDOCAINE HYDROCHLORIDE 10 MG/ML
INJECTION, SOLUTION EPIDURAL; INFILTRATION; INTRACAUDAL; PERINEURAL CODE/TRAUMA/SEDATION MEDICATION
Status: COMPLETED | OUTPATIENT
Start: 2020-11-24 | End: 2020-11-24

## 2020-11-24 RX ORDER — NITROGLYCERIN 0.4 MG/1
0.4 TABLET SUBLINGUAL
Status: DISCONTINUED | OUTPATIENT
Start: 2020-11-24 | End: 2020-11-25 | Stop reason: HOSPADM

## 2020-11-24 RX ORDER — MIDAZOLAM HYDROCHLORIDE 2 MG/2ML
INJECTION, SOLUTION INTRAMUSCULAR; INTRAVENOUS CODE/TRAUMA/SEDATION MEDICATION
Status: COMPLETED | OUTPATIENT
Start: 2020-11-24 | End: 2020-11-24

## 2020-11-24 RX ORDER — ATORVASTATIN CALCIUM 40 MG/1
80 TABLET, FILM COATED ORAL EVERY EVENING
Status: DISCONTINUED | OUTPATIENT
Start: 2020-11-24 | End: 2020-11-25 | Stop reason: HOSPADM

## 2020-11-24 RX ORDER — PRASUGREL 10 MG/1
TABLET, FILM COATED ORAL CODE/TRAUMA/SEDATION MEDICATION
Status: COMPLETED | OUTPATIENT
Start: 2020-11-24 | End: 2020-11-24

## 2020-11-24 RX ADMIN — LIDOCAINE HYDROCHLORIDE 1 ML: 10 INJECTION, SOLUTION EPIDURAL; INFILTRATION; INTRACAUDAL at 09:00

## 2020-11-24 RX ADMIN — PRASUGREL HYDROCHLORIDE 10 MG: 10 TABLET, FILM COATED ORAL at 08:57

## 2020-11-24 RX ADMIN — METOPROLOL TARTRATE 25 MG: 25 TABLET, FILM COATED ORAL at 21:02

## 2020-11-24 RX ADMIN — ASPIRIN 81 MG: 81 TABLET, CHEWABLE ORAL at 08:57

## 2020-11-24 RX ADMIN — IOHEXOL 65 ML: 350 INJECTION, SOLUTION INTRAVENOUS at 09:13

## 2020-11-24 RX ADMIN — HEPARIN SODIUM 7000 UNITS: 1000 INJECTION INTRAVENOUS; SUBCUTANEOUS at 09:01

## 2020-11-24 RX ADMIN — NITROGLYCERIN 200 MCG: 20 INJECTION INTRAVENOUS at 09:01

## 2020-11-24 RX ADMIN — METOPROLOL TARTRATE 25 MG: 25 TABLET, FILM COATED ORAL at 11:05

## 2020-11-24 RX ADMIN — SODIUM CHLORIDE 75 ML/HR: 0.9 INJECTION, SOLUTION INTRAVENOUS at 09:13

## 2020-11-24 RX ADMIN — ATORVASTATIN CALCIUM 80 MG: 40 TABLET, FILM COATED ORAL at 17:13

## 2020-11-24 RX ADMIN — SODIUM CHLORIDE 75 ML/HR: 0.9 INJECTION, SOLUTION INTRAVENOUS at 03:45

## 2020-11-24 RX ADMIN — MIDAZOLAM HYDROCHLORIDE 2 MG: 1 INJECTION, SOLUTION INTRAMUSCULAR; INTRAVENOUS at 08:56

## 2020-11-24 RX ADMIN — SODIUM CHLORIDE 75 ML/HR: 0.9 INJECTION, SOLUTION INTRAVENOUS at 10:12

## 2020-11-24 RX ADMIN — FENTANYL CITRATE 50 MCG: 50 INJECTION INTRAMUSCULAR; INTRAVENOUS at 08:56

## 2020-11-24 RX ADMIN — VERAPAMIL HYDROCHLORIDE 2.5 MG: 2.5 INJECTION, SOLUTION INTRAVENOUS at 09:01

## 2020-11-24 RX ADMIN — ENOXAPARIN SODIUM 40 MG: 40 INJECTION SUBCUTANEOUS at 11:05

## 2020-11-25 ENCOUNTER — TRANSITIONAL CARE MANAGEMENT (OUTPATIENT)
Dept: FAMILY MEDICINE CLINIC | Facility: CLINIC | Age: 60
End: 2020-11-25

## 2020-11-25 VITALS
SYSTOLIC BLOOD PRESSURE: 128 MMHG | RESPIRATION RATE: 18 BRPM | DIASTOLIC BLOOD PRESSURE: 70 MMHG | TEMPERATURE: 97.9 F | HEART RATE: 74 BPM | OXYGEN SATURATION: 96 %

## 2020-11-25 LAB
ANION GAP SERPL CALCULATED.3IONS-SCNC: 10 MMOL/L (ref 4–13)
BASOPHILS # BLD AUTO: 0.06 THOUSANDS/ΜL (ref 0–0.1)
BASOPHILS NFR BLD AUTO: 1 % (ref 0–1)
BUN SERPL-MCNC: 11 MG/DL (ref 5–25)
CALCIUM SERPL-MCNC: 8.5 MG/DL (ref 8.3–10.1)
CHLORIDE SERPL-SCNC: 107 MMOL/L (ref 100–108)
CO2 SERPL-SCNC: 24 MMOL/L (ref 21–32)
CREAT SERPL-MCNC: 0.9 MG/DL (ref 0.6–1.3)
EOSINOPHIL # BLD AUTO: 0.11 THOUSAND/ΜL (ref 0–0.61)
EOSINOPHIL NFR BLD AUTO: 1 % (ref 0–6)
ERYTHROCYTE [DISTWIDTH] IN BLOOD BY AUTOMATED COUNT: 12.7 % (ref 11.6–15.1)
GFR SERPL CREATININE-BSD FRML MDRD: 94 ML/MIN/1.73SQ M
GLUCOSE SERPL-MCNC: 118 MG/DL (ref 65–140)
HCT VFR BLD AUTO: 41.3 % (ref 36.5–49.3)
HGB BLD-MCNC: 14.1 G/DL (ref 12–17)
IMM GRANULOCYTES # BLD AUTO: 0.04 THOUSAND/UL (ref 0–0.2)
IMM GRANULOCYTES NFR BLD AUTO: 0 % (ref 0–2)
LYMPHOCYTES # BLD AUTO: 1.88 THOUSANDS/ΜL (ref 0.6–4.47)
LYMPHOCYTES NFR BLD AUTO: 20 % (ref 14–44)
MCH RBC QN AUTO: 30.7 PG (ref 26.8–34.3)
MCHC RBC AUTO-ENTMCNC: 34.1 G/DL (ref 31.4–37.4)
MCV RBC AUTO: 90 FL (ref 82–98)
MONOCYTES # BLD AUTO: 0.94 THOUSAND/ΜL (ref 0.17–1.22)
MONOCYTES NFR BLD AUTO: 10 % (ref 4–12)
NEUTROPHILS # BLD AUTO: 6.2 THOUSANDS/ΜL (ref 1.85–7.62)
NEUTS SEG NFR BLD AUTO: 68 % (ref 43–75)
NRBC BLD AUTO-RTO: 0 /100 WBCS
PLATELET # BLD AUTO: 270 THOUSANDS/UL (ref 149–390)
PMV BLD AUTO: 9.4 FL (ref 8.9–12.7)
POTASSIUM SERPL-SCNC: 3.8 MMOL/L (ref 3.5–5.3)
RBC # BLD AUTO: 4.59 MILLION/UL (ref 3.88–5.62)
SODIUM SERPL-SCNC: 141 MMOL/L (ref 136–145)
WBC # BLD AUTO: 9.23 THOUSAND/UL (ref 4.31–10.16)

## 2020-11-25 PROCEDURE — 99232 SBSQ HOSP IP/OBS MODERATE 35: CPT | Performed by: INTERNAL MEDICINE

## 2020-11-25 PROCEDURE — 85025 COMPLETE CBC W/AUTO DIFF WBC: CPT | Performed by: INTERNAL MEDICINE

## 2020-11-25 PROCEDURE — 99239 HOSP IP/OBS DSCHRG MGMT >30: CPT | Performed by: INTERNAL MEDICINE

## 2020-11-25 PROCEDURE — 80048 BASIC METABOLIC PNL TOTAL CA: CPT | Performed by: INTERNAL MEDICINE

## 2020-11-25 RX ORDER — ATORVASTATIN CALCIUM 80 MG/1
80 TABLET, FILM COATED ORAL EVERY EVENING
Qty: 30 TABLET | Refills: 0 | Status: SHIPPED | OUTPATIENT
Start: 2020-11-25 | End: 2020-12-01

## 2020-11-25 RX ORDER — NITROGLYCERIN 0.4 MG/1
0.4 TABLET SUBLINGUAL
Qty: 60 TABLET | Refills: 0 | Status: SHIPPED | OUTPATIENT
Start: 2020-11-25 | End: 2021-07-27 | Stop reason: SDUPTHER

## 2020-11-25 RX ORDER — ASPIRIN 81 MG/1
162 TABLET, CHEWABLE ORAL DAILY
Qty: 30 TABLET | Refills: 0 | Status: SHIPPED | OUTPATIENT
Start: 2020-11-26

## 2020-11-25 RX ADMIN — METOPROLOL TARTRATE 25 MG: 25 TABLET, FILM COATED ORAL at 08:29

## 2020-11-25 RX ADMIN — ENOXAPARIN SODIUM 40 MG: 40 INJECTION SUBCUTANEOUS at 08:29

## 2020-11-25 RX ADMIN — PRASUGREL HYDROCHLORIDE 10 MG: 10 TABLET, FILM COATED ORAL at 08:29

## 2020-11-25 RX ADMIN — ASPIRIN 81 MG CHEWABLE TABLET 162 MG: 81 TABLET CHEWABLE at 08:29

## 2020-12-01 ENCOUNTER — OFFICE VISIT (OUTPATIENT)
Dept: CARDIOLOGY CLINIC | Facility: CLINIC | Age: 60
End: 2020-12-01
Payer: COMMERCIAL

## 2020-12-01 VITALS
HEIGHT: 69 IN | OXYGEN SATURATION: 97 % | DIASTOLIC BLOOD PRESSURE: 80 MMHG | WEIGHT: 203 LBS | HEART RATE: 84 BPM | TEMPERATURE: 98.3 F | SYSTOLIC BLOOD PRESSURE: 120 MMHG | BODY MASS INDEX: 30.07 KG/M2

## 2020-12-01 DIAGNOSIS — Z98.61 HISTORY OF PERCUTANEOUS CORONARY INTERVENTION: ICD-10-CM

## 2020-12-01 DIAGNOSIS — Z12.11 ENCOUNTER FOR SCREENING COLONOSCOPY: ICD-10-CM

## 2020-12-01 DIAGNOSIS — I10 ESSENTIAL HYPERTENSION: ICD-10-CM

## 2020-12-01 DIAGNOSIS — E78.2 MIXED HYPERLIPIDEMIA: ICD-10-CM

## 2020-12-01 DIAGNOSIS — I25.10 CORONARY ARTERY DISEASE INVOLVING NATIVE CORONARY ARTERY OF NATIVE HEART WITHOUT ANGINA PECTORIS: ICD-10-CM

## 2020-12-01 DIAGNOSIS — I21.4 NSTEMI (NON-ST ELEVATED MYOCARDIAL INFARCTION) (HCC): ICD-10-CM

## 2020-12-01 PROCEDURE — 99214 OFFICE O/P EST MOD 30 MIN: CPT | Performed by: INTERNAL MEDICINE

## 2020-12-01 PROCEDURE — 93000 ELECTROCARDIOGRAM COMPLETE: CPT | Performed by: INTERNAL MEDICINE

## 2020-12-14 ENCOUNTER — PATIENT MESSAGE (OUTPATIENT)
Dept: CARDIOLOGY CLINIC | Facility: CLINIC | Age: 60
End: 2020-12-14

## 2020-12-14 DIAGNOSIS — I21.4 NSTEMI (NON-ST ELEVATION MYOCARDIAL INFARCTION) (HCC): ICD-10-CM

## 2020-12-14 DIAGNOSIS — I21.4 NSTEMI (NON-ST ELEVATED MYOCARDIAL INFARCTION) (HCC): ICD-10-CM

## 2020-12-14 DIAGNOSIS — Z95.5 S/P CORONARY ARTERY STENT PLACEMENT: ICD-10-CM

## 2020-12-14 RX ORDER — ATORVASTATIN CALCIUM 80 MG/1
80 TABLET, FILM COATED ORAL
Qty: 90 TABLET | Refills: 3 | Status: SHIPPED | OUTPATIENT
Start: 2020-12-14 | End: 2020-12-15 | Stop reason: SDUPTHER

## 2020-12-14 RX ORDER — PRASUGREL 10 MG/1
10 TABLET, FILM COATED ORAL DAILY
Qty: 90 TABLET | Refills: 3 | Status: SHIPPED | OUTPATIENT
Start: 2020-12-14 | End: 2020-12-15 | Stop reason: SDUPTHER

## 2020-12-15 DIAGNOSIS — I21.4 NSTEMI (NON-ST ELEVATED MYOCARDIAL INFARCTION) (HCC): ICD-10-CM

## 2020-12-15 DIAGNOSIS — Z95.5 S/P CORONARY ARTERY STENT PLACEMENT: ICD-10-CM

## 2020-12-15 DIAGNOSIS — I21.4 NSTEMI (NON-ST ELEVATION MYOCARDIAL INFARCTION) (HCC): ICD-10-CM

## 2020-12-15 RX ORDER — PRASUGREL 10 MG/1
10 TABLET, FILM COATED ORAL DAILY
Qty: 90 TABLET | Refills: 3 | Status: SHIPPED | OUTPATIENT
Start: 2020-12-15 | End: 2021-12-12

## 2020-12-15 RX ORDER — ATORVASTATIN CALCIUM 80 MG/1
80 TABLET, FILM COATED ORAL
Qty: 90 TABLET | Refills: 3 | Status: SHIPPED | OUTPATIENT
Start: 2020-12-15 | End: 2021-12-12

## 2021-01-26 ENCOUNTER — CLINICAL SUPPORT (OUTPATIENT)
Dept: CARDIAC REHAB | Facility: CLINIC | Age: 61
End: 2021-01-26
Payer: COMMERCIAL

## 2021-01-26 DIAGNOSIS — Z95.5 S/P CORONARY ARTERY STENT PLACEMENT: ICD-10-CM

## 2021-01-26 PROCEDURE — 93798 PHYS/QHP OP CAR RHAB W/ECG: CPT

## 2021-01-26 NOTE — PROGRESS NOTES
Cardiac Rehabilitation Plan of Care   Initial Care Plan          Today's date: 2021   # of Exercise Sessions Completed: Initial  Patient name: Amy Rebolledo      : 1960  Age: 64 y o  MRN: [id number]  Referring Physician: Cordelia Rowan MD  Cardiologist: Jose Mejia  Provider: Vanda Valdivia  Clinician: Khloe King RN    Dx:   Encounter Diagnosis   Name Primary?  S/P coronary artery stent placement      Date of onset: 2020      SUMMARY OF PROGRESS:  Completed initial evaluation  Explained cardiac rehabilitation, cardiac risk factors, how the heart functions, Ejection fraction, nitroglycerin, metobrolol and heart healthy diet  Completed 6 minute walk, sub max treadmill, arm curl and chair to stand test  Obtained BMI, body fat% and waist measurements  Telemetry monitor NSR at rest and with exercise  Exercise MET level 2 15-5  78  RPE 3-5  Tolerated exercise well  Denied any complaint of chest discomfort  Has chronic discomfort in left shoulder  Provided him with handbook for heart attack survivors and families, cardiac rehabilitation and low salt recipes  Will continue to monitor       Medication compliance: Yes   Comments: Pt reports to be compliant with medications  Fall Risk: Low   Comments: Ambulates with a steady gait with no assist device and Denies a fall in the past 6 months    EKG Interpretation: NSr      EXERCISE ASSESSMENT and PLAN    Current Exercise Program in Rehab:       Frequency: 1 days/week        Minutes: 23         METS: 2 15-5 78            HR:    RPE: 3-5         Modalities: Treadmill and Room walking      Exercise Progression 30 Day Goals :    Frequency: 1-3 days/week of cardiac rehab     Supplement with home exercise 2+ days/wk as tolerated    Minutes: 35-45  >150 mins/wk of moderate intensity exercise   METS: 3 08-6 0   HR:     RPE: 4-6   Modalities: Treadmill, UBE, Lifecycle, Elliptical, NuStep and Recumbent bike    Strength trainin-3 days / week  12-15 repetitions  1-2 sets per modality   Will be added following 2-3 weeks of monitored exercise sessions   Modalities: Lateral Raise, Arm Curl, Upright Rows, Front Raises and Shoulder Shrugs    Home Exercise: Type: elliptical or bike , Frequency: 2-3 days/week, Duration: 15-45 mins    Goals: Attend Rehab regularly and Start a walking program, return to walking outdoors 2 miles a day in the spring    Progression Toward Goals:  Reviewed Pt goals and determined plan of care, Will continue to educate and progress as tolerated  Education: benefit of exercise for CAD risk factors, home exercise guidelines, AHA guidelines to achieve >150 mins/wk of moderate exercise and RPE scale   Plan:education on home exercise guidelines and home exercise 30+ mins 2 days opposite CR  Readiness to change: Action:  (Changing behavior)      NUTRITION ASSESSMENT AND PLAN    Weight control:    Starting weight: 206   Current weight:     Waist circumference:    Startin 5   Current:      Diabetes: Pre-diabetes  A1c: 5 7    last measured: 2020    Lipid management: Discussed diet and lipid management and Last lipid profile 2020  Chol 252    HDL 43      Goals:Eat 4-5 cups of fruits and vegetables daily and continue to consume a heart healthy diet, weight loss 5 to 10 pounds post CR    Progression Toward Goals: Reviewed Pt goals and determined plan of care, Will continue to educate and progress as tolerated  Education: heart healthy eating  low sodium diet  hydration  nutrition for  lipid management  nutrition for Improved BG control  wt  loss   Plan: use salt substitute like Mrs   Dash, increase utilization of fresh or dried herbs, eat more home cooked meals and eat out less often and learn how to read food labels  Readiness to change: Action:  (Changing behavior)      PSYCHOSOCIAL ASSESSMENT AND PLAN    Emotional:  Depression assessment:  PHQ-9 = 5-9 = Mild Depression            Anxiety measure: ANITHA-7 = 5-9 = Mild anxiety  Self-reported stress level:  4  Social support: Very Good and Patient reports excellent emotional/social support from family    Goals:  Reduce perceived stress to 1-3/10, PHQ-9 - reduced severity by one level, improved sleep, stop worrying, Feel less anxious and practice yoga, enroll in [facility] program    Progression Toward Goals: Reviewed Pt goals and determined plan of care, Will continue to educate and progress as tolerated  Education: signs/sxs of depression, benefits of a positive support system, stress management techniques, depression and CAD and benefits of mental health counseling  Plan: Refer to behavioral health/counseling, PHQ-9 >5 will refer to MD, Refer to [facility], Practice relaxation techniques, Exercise, Keep a positive mindset and Repeat PHQ-9 every 30 days if score >5  Readiness to change: Preparation:  (Getting ready to change)       OTHER CORE COMPONENTS     Tobacco:   Social History     Tobacco Use   Smoking Status Never Smoker   Smokeless Tobacco Never Used       Tobacco Use Intervention:   N/A:  Patient is a non-smoker     Anginal Symptoms:  None   NTG use: Compliant with carrying NTG, Understands proper use, Reviewed Proper use and Pt has not used NTG since event    Blood pressure:    Restin/84   Exercise: 148/80    Goals: consistent BP < 130/80, reduced dietary sodium <2300mg, moderate intensity exercise >150 mins/wk and medication compliance    Progression Toward Goals: Reviewed Pt goals and determined plan of care, Will continue to educate and progress as tolerated      Education:  understanding high blood pressure and it's relationship to CAD, low sodium diet and HTN, proper use of sublingual NTG, Education class:  Common Heart Medications and Education class: Understanding Heart Disease  Plan: Avoid Processed foods and engage in regular exercise  Readiness to change: Action:  (Changing behavior)

## 2021-01-26 NOTE — PROGRESS NOTES
CARDIAC REHAB ASSESSMENT    Today's date: 2021  Patient name: Lennox Van     : 1960       MRN: [id number]  PCP: Lynn Younger DO  Referring Physician: Robinson Anderson MD  Cardiologist: Dominique Patton  Surgeon: N/A  Dx:   Encounter Diagnosis   Name Primary?  S/P coronary artery stent placement        Date of onset: 2020  Cultural needs: none    Height:    Wt Readings from Last 1 Encounters:   20 92 1 kg (203 lb)      Weight:   Ht Readings from Last 1 Encounters:   20 5' 9" (1 753 m)     Medical History:   Past Medical History:   Diagnosis Date    [facility]     Dry eyes     Spinal stenosis          Physical Limitations: Left shoulder discomfort since 2020    Fall Risk: Low   Comments: Ambulates with a steady gait with no assist device and Denies a fall in the past 6 months    Anginal Equivalent: None/denies angina   NTG use: Compliant with carrying NTG, Understands proper use, Reviewed Proper use and Pt has not used NTG since event    Risk Factors   Cholesterol: Yes  Smoking: Never used  HTN: Yes  DM: No  Obesity: No   Inactivity: started exercising on elliptical 3 minutes and recumbent bike 3 minutes  Stress:  perceived  stress: 4/10   Stressors:work, fear of repeat cardiac event   Goals for Stress Management:Practice Relaxation Techniques, Exercise, Keep a positive mindset, Consult a Counselor and started yoga, will enroll in [facility] program    Family History:No family history on file  Allergies: Patient has no known allergies    ETOH:   Social History     Substance and Sexual Activity   Alcohol Use Not Currently    Frequency: 2-4 times a month    Drinks per session: 1 or 2    Binge frequency: Never         Current Medications:   Current Outpatient Medications   Medication Sig Dispense Refill    aspirin 81 mg chewable tablet Chew 2 tablets (162 mg total) daily 30 tablet 0    atorvastatin (LIPITOR) 80 mg tablet Take 1 tablet (80 mg total) by mouth daily with dinner 90 tablet 3    metoprolol tartrate (LOPRESSOR) 25 mg tablet Take 1 tablet (25 mg total) by mouth every 12 (twelve) hours 180 tablet 3    nitroglycerin (NITROSTAT) 0 4 mg SL tablet Place 1 tablet (0 4 mg total) under the tongue every 5 (five) minutes as needed for chest pain (Patient not taking: Reported on 12/1/2020) 60 tablet 0    prasugrel (EFFIENT) tablet Take 1 tablet (10 mg total) by mouth daily 90 tablet 3     No current facility-administered medications for this visit  Functional Status Prior to Diagnosis for Treatment   Occupation: full time job IT  Recreation: hiking  ADLs: able to perform self-care  [place]: able to perform self-care  Exercise: hiking 1 to 2 times a week 45 minutes      Current Functional Status  Occupation: full time job IT  Recreation: hiking  ADLs:able to perform self-care  [place]: able to perform self-care  Exercise: recumbent bike and elliptical 3 minutes each    Patient Specific Goals:  Attend cardiac rehabilitation regularly    Short Term Program Goals: dietary modifications exercise 120-150 mins/wk decrease weight with increase activity, decrease portions, heart healthy diet and increase activity    Long Term Goals: increased maximal walking duration  Reduced stress  weight loss goal of 5 to 10 pounds  Return to walking outdoors 2 miles a day in the Spring, increase fruits and vegetables to 4 to 5 servings a day    Ability to reach goals/rehabilitation potential:  Very Good     Projected return to function: 8-12 weeks  Objective tests: sub-max TM ETT  6 MWT  sit to stand  arm curl      Nutritional   Reviewed details of Rate your Plate  Discussed key elements of heart healthy eating  Reviewed patient goals for dietary modifications and their clinical implications  Reviewed most recent lipid profile       Goals for dietary modification: increase fruits and vegetables      Emotional/Social  Patient reports feelings of depression   Patient reports feelings of anxiety  Reports sufficient emotional support  Provided contact information for [facility]  Plans to speak to MD regarding medical therapy  Provided information on [facility]    Marital status:       Domestic Violence Screening: feels safe and free of harm        Comments: initial evaluation completed

## 2021-01-26 NOTE — PROGRESS NOTES
Durga Pall   1960     Risk: moderate     Pre Post % Change Goal   Date: 1/26/2020      Physical       Sub Max ETT (mets) 5 78   10% increase   6MWT (feet) 1710   10% increase   Arm curls 29      Chair to stands 12      MURRAY Al (est peak O2) 8 97      Peak exercise CR/RI (mets) 5 78   40% increase   Emotional       PHQ9 (> 10 refer to MD)  GAD7 6  5   4 pt decrease   [place] (lower score = improvement) 17      Total 17   < 27   Feelings 2   < 3   Physical Fitness 2   < 3   Social Support 1   < 3   Daily Activities 1   < 3   Social Activities 3   < 3   Pain 3   < 3   Overall Health 2   < 3   Quality of Life 2   < 3   Change in Health 2   < 3   Dietary       Rate your plate 66   > 58   Measurements       Weight 206   2 5 - 5%   BMI 30 4   19 - 25   Waist Circ  41 5   < 40 M / < 35 F   % Body fat 30 5   < 25 M / < 33 F   BP left arm               (systolic) 675   < 626   (diastolic) 84   < 90   Smoking #/day  (if applicable) 0   0   Lipids/Glucose (Date) 11/24/20      Total cholesterol 252   50 - 200   Triglycerides 214   < 150   HDL 43   40 - 60      < 100   A1C 5 7   4 0 - 5 6%   Fasting

## 2021-02-01 ENCOUNTER — APPOINTMENT (OUTPATIENT)
Dept: CARDIAC REHAB | Facility: CLINIC | Age: 61
End: 2021-02-01
Payer: COMMERCIAL

## 2021-02-03 ENCOUNTER — CLINICAL SUPPORT (OUTPATIENT)
Dept: CARDIAC REHAB | Facility: CLINIC | Age: 61
End: 2021-02-03
Payer: COMMERCIAL

## 2021-02-03 DIAGNOSIS — Z95.5 S/P PRIMARY ANGIOPLASTY WITH CORONARY STENT: ICD-10-CM

## 2021-02-03 PROCEDURE — 93798 PHYS/QHP OP CAR RHAB W/ECG: CPT

## 2021-02-05 ENCOUNTER — CLINICAL SUPPORT (OUTPATIENT)
Dept: CARDIAC REHAB | Facility: CLINIC | Age: 61
End: 2021-02-05
Payer: COMMERCIAL

## 2021-02-05 DIAGNOSIS — Z95.5 STATUS POST PRIMARY ANGIOPLASTY WITH CORONARY STENT: ICD-10-CM

## 2021-02-05 PROCEDURE — 93798 PHYS/QHP OP CAR RHAB W/ECG: CPT

## 2021-02-08 ENCOUNTER — CLINICAL SUPPORT (OUTPATIENT)
Dept: CARDIAC REHAB | Facility: CLINIC | Age: 61
End: 2021-02-08
Payer: COMMERCIAL

## 2021-02-08 DIAGNOSIS — Z95.5 STATUS POST PRIMARY ANGIOPLASTY WITH CORONARY STENT: ICD-10-CM

## 2021-02-08 PROCEDURE — 93798 PHYS/QHP OP CAR RHAB W/ECG: CPT

## 2021-02-10 ENCOUNTER — CLINICAL SUPPORT (OUTPATIENT)
Dept: CARDIAC REHAB | Facility: CLINIC | Age: 61
End: 2021-02-10
Payer: COMMERCIAL

## 2021-02-10 DIAGNOSIS — Z95.5 S/P PRIMARY ANGIOPLASTY WITH CORONARY STENT: ICD-10-CM

## 2021-02-10 PROCEDURE — 93798 PHYS/QHP OP CAR RHAB W/ECG: CPT

## 2021-02-12 ENCOUNTER — CLINICAL SUPPORT (OUTPATIENT)
Dept: CARDIAC REHAB | Facility: CLINIC | Age: 61
End: 2021-02-12
Payer: COMMERCIAL

## 2021-02-12 DIAGNOSIS — Z95.5 STATUS POST PRIMARY ANGIOPLASTY WITH CORONARY STENT: ICD-10-CM

## 2021-02-12 PROCEDURE — 93798 PHYS/QHP OP CAR RHAB W/ECG: CPT

## 2021-02-15 ENCOUNTER — CLINICAL SUPPORT (OUTPATIENT)
Dept: CARDIAC REHAB | Facility: CLINIC | Age: 61
End: 2021-02-15
Payer: COMMERCIAL

## 2021-02-15 DIAGNOSIS — I21.4 NSTEMI (NON-ST ELEVATED MYOCARDIAL INFARCTION) (HCC): ICD-10-CM

## 2021-02-15 PROCEDURE — 93798 PHYS/QHP OP CAR RHAB W/ECG: CPT

## 2021-02-17 ENCOUNTER — CLINICAL SUPPORT (OUTPATIENT)
Dept: CARDIAC REHAB | Facility: CLINIC | Age: 61
End: 2021-02-17
Payer: COMMERCIAL

## 2021-02-17 DIAGNOSIS — I21.4 NSTEMI (NON-ST ELEVATED MYOCARDIAL INFARCTION) (HCC): ICD-10-CM

## 2021-02-17 PROCEDURE — 93798 PHYS/QHP OP CAR RHAB W/ECG: CPT

## 2021-02-19 ENCOUNTER — CLINICAL SUPPORT (OUTPATIENT)
Dept: CARDIAC REHAB | Facility: CLINIC | Age: 61
End: 2021-02-19
Payer: COMMERCIAL

## 2021-02-19 DIAGNOSIS — Z95.5 STATUS POST PRIMARY ANGIOPLASTY WITH CORONARY STENT: ICD-10-CM

## 2021-02-19 PROCEDURE — 93798 PHYS/QHP OP CAR RHAB W/ECG: CPT

## 2021-02-22 ENCOUNTER — CLINICAL SUPPORT (OUTPATIENT)
Dept: CARDIAC REHAB | Facility: CLINIC | Age: 61
End: 2021-02-22
Payer: COMMERCIAL

## 2021-02-22 DIAGNOSIS — Z95.5 S/P PRIMARY ANGIOPLASTY WITH CORONARY STENT: ICD-10-CM

## 2021-02-22 PROCEDURE — 93798 PHYS/QHP OP CAR RHAB W/ECG: CPT

## 2021-02-22 NOTE — PROGRESS NOTES
Cardiac Rehabilitation Plan of Care   30 Day Reassessment          Today's date: 2021   # of Exercise Sessions Completed: 10  Patient name: Beba Houston      : 1960  Age: 64 y o  MRN: [id number]  Referring Physician: Emily Almazan MD  Cardiologist: Jacki Dominguez  Provider: Luís Ortiz  Clinician: Vasyl King RN    Dx:   Encounter Diagnosis   Name Primary?  S/P primary angioplasty with coronary stent      Date of onset: 2020      SUMMARY OF PROGRESS: Mr Charissa Lema completed 9 sessions of the cardiac rehabilitation program  explained cardiac risk factors, how the heart functions, Ejection fraction, nitroglycerin, metoprolol and heart healthy diet  Telemetry monitor NSR at rest and with exercise  Exercise MET level 2 15-6 3  RPE 4-5  Tolerated exercise well  Denied any complaint of chest discomfort  Has chronic discomfort in left shoulder  He stated he noticed an increase in energy  He has been exercising at home alternating elliptical and recumbent bike 15 to 30 minutes  Provided him with handbook for heart attack survivors and families, cardiac rehabilitation and low salt recipes  Will continue to monitor       Medication compliance: Yes   Comments: Pt reports to be compliant with medications  Fall Risk: Low   Comments: Ambulates with a steady gait with no assist device and Denies a fall in the past 6 months    EKG Interpretation: NSR      EXERCISE ASSESSMENT and PLAN    Current Exercise Program in Rehab:       Frequency: 3 days/week        Minutes: 50         METS: 2 15-6 3            HR:    RPE: 4-5         Modalities: Treadmill, Airdyne bike, UBE, Elliptical and Recumbent bike      Exercise Progression 30 Day Goals :    Frequency: 1-3 days/week of cardiac rehab     Supplement with home exercise 2+ days/wk as tolerated    Minutes: 45-50  >150 mins/wk of moderate intensity exercise   METS: 3 08-7 0   HR:     RPE: 4-6   Modalities: Treadmill, Airdyne bike, UBE, Lifecycle, Elliptical, NuStep and Recumbent bike    Strength trainin-3 days / week  12-15 repetitions  1-2 sets per modality   Will be added following 2-3 weeks of monitored exercise sessions   Modalities: Lateral Raise, Arm Curl, Upright Rows, Front Raises and Shoulder Shrugs    Home Exercise: Type: elliptical or bike , Frequency: 2-3 days/week, Duration: 15-45 mins    Goals: Attend Rehab regularly and Start a walking program, return to walking outdoors 2 miles a day in the spring    Progression Toward Goals:  Reviewed Pt goals and determined plan of care, Will continue to educate and progress as tolerated  Education: benefit of exercise for CAD risk factors, home exercise guidelines, AHA guidelines to achieve >150 mins/wk of moderate exercise and RPE scale   Plan:education on home exercise guidelines and home exercise 30+ mins 2 days opposite CR  Readiness to change: Action:  (Changing behavior)      NUTRITION ASSESSMENT AND PLAN    Weight control:    Starting weight: 206   Current weight:     Waist circumference:    Startin 5   Current:      Diabetes: Pre-diabetes  A1c: 5 7    last measured: 2020    Lipid management: Discussed diet and lipid management and Last lipid profile 2020  Chol 252    HDL 43      Goals:Eat 4-5 cups of fruits and vegetables daily and continue to consume a heart healthy diet, weight loss 5 to 10 pounds post CR    Progression Toward Goals: Reviewed Pt goals and determined plan of care, Will continue to educate and progress as tolerated  Education: heart healthy eating  low sodium diet  hydration  nutrition for  lipid management  nutrition for Improved BG control  wt  loss   Plan: use salt substitute like Mrs   Dash, increase utilization of fresh or dried herbs, eat more home cooked meals and eat out less often and learn how to read food labels  Readiness to change: Action:  (Changing behavior)      PSYCHOSOCIAL ASSESSMENT AND PLAN    Emotional: Depression assessment:  PHQ-9 = 5-9 = Mild Depression            Anxiety measure:  ANITHA-7 = 5-9 = Mild anxiety  Self-reported stress level:  4  Social support: Very Good and Patient reports excellent emotional/social support from family    Goals:  Reduce perceived stress to 1-3/10, PHQ-9 - reduced severity by one level, improved sleep, stop worrying, Feel less anxious and practice yoga, enroll in [facility] program    Progression Toward Goals: Reviewed Pt goals and determined plan of care, Will continue to educate and progress as tolerated  Education: signs/sxs of depression, benefits of a positive support system, stress management techniques, depression and CAD and benefits of mental health counseling  Plan: Refer to behavioral health/counseling, PHQ-9 >5 will refer to MD, Refer to [facility], Practice relaxation techniques, Exercise, Keep a positive mindset and Repeat PHQ-9 every 30 days if score >5  Readiness to change: Preparation:  (Getting ready to change)       OTHER CORE COMPONENTS     Tobacco:   Social History     Tobacco Use   Smoking Status Never Smoker   Smokeless Tobacco Never Used       Tobacco Use Intervention:   N/A:  Patient is a non-smoker     Anginal Symptoms:  None   NTG use: Compliant with carrying NTG, Understands proper use, Reviewed Proper use and Pt has not used NTG since event    Blood pressure:    Restin/74   Exercise: 160/80    Goals: consistent BP < 130/80, reduced dietary sodium <2300mg, moderate intensity exercise >150 mins/wk and medication compliance    Progression Toward Goals: Reviewed Pt goals and determined plan of care, Will continue to educate and progress as tolerated      Education:  understanding high blood pressure and it's relationship to CAD, low sodium diet and HTN, proper use of sublingual NTG, Education class:  Common Heart Medications and Education class: Understanding Heart Disease  Plan: Avoid Processed foods and engage in regular exercise  Readiness to change: Action:  (Changing behavior)

## 2021-02-24 ENCOUNTER — CLINICAL SUPPORT (OUTPATIENT)
Dept: CARDIAC REHAB | Facility: CLINIC | Age: 61
End: 2021-02-24
Payer: COMMERCIAL

## 2021-02-24 DIAGNOSIS — Z95.5 S/P PRIMARY ANGIOPLASTY WITH CORONARY STENT: ICD-10-CM

## 2021-02-24 PROCEDURE — 93798 PHYS/QHP OP CAR RHAB W/ECG: CPT

## 2021-02-26 ENCOUNTER — CLINICAL SUPPORT (OUTPATIENT)
Dept: CARDIAC REHAB | Facility: CLINIC | Age: 61
End: 2021-02-26
Payer: COMMERCIAL

## 2021-02-26 DIAGNOSIS — I21.4 NSTEMI (NON-ST ELEVATED MYOCARDIAL INFARCTION) (HCC): ICD-10-CM

## 2021-02-26 DIAGNOSIS — Z95.5 S/P PRIMARY ANGIOPLASTY WITH CORONARY STENT: ICD-10-CM

## 2021-02-26 PROCEDURE — 93798 PHYS/QHP OP CAR RHAB W/ECG: CPT

## 2021-03-01 ENCOUNTER — CLINICAL SUPPORT (OUTPATIENT)
Dept: CARDIAC REHAB | Facility: CLINIC | Age: 61
End: 2021-03-01
Payer: COMMERCIAL

## 2021-03-01 DIAGNOSIS — Z95.5 STATUS POST PRIMARY ANGIOPLASTY WITH CORONARY STENT: ICD-10-CM

## 2021-03-01 PROCEDURE — 93798 PHYS/QHP OP CAR RHAB W/ECG: CPT

## 2021-03-03 ENCOUNTER — CLINICAL SUPPORT (OUTPATIENT)
Dept: CARDIAC REHAB | Facility: CLINIC | Age: 61
End: 2021-03-03
Payer: COMMERCIAL

## 2021-03-03 DIAGNOSIS — Z95.5 STATUS POST PRIMARY ANGIOPLASTY WITH CORONARY STENT: ICD-10-CM

## 2021-03-03 PROCEDURE — 93798 PHYS/QHP OP CAR RHAB W/ECG: CPT

## 2021-03-05 ENCOUNTER — CLINICAL SUPPORT (OUTPATIENT)
Dept: CARDIAC REHAB | Facility: CLINIC | Age: 61
End: 2021-03-05
Payer: COMMERCIAL

## 2021-03-05 DIAGNOSIS — Z95.5 S/P PRIMARY ANGIOPLASTY WITH CORONARY STENT: ICD-10-CM

## 2021-03-05 PROCEDURE — 93798 PHYS/QHP OP CAR RHAB W/ECG: CPT

## 2021-03-08 ENCOUNTER — CLINICAL SUPPORT (OUTPATIENT)
Dept: CARDIAC REHAB | Facility: CLINIC | Age: 61
End: 2021-03-08
Payer: COMMERCIAL

## 2021-03-08 DIAGNOSIS — Z95.5 STATUS POST PRIMARY ANGIOPLASTY WITH CORONARY STENT: ICD-10-CM

## 2021-03-08 PROCEDURE — 93798 PHYS/QHP OP CAR RHAB W/ECG: CPT

## 2021-03-10 ENCOUNTER — CLINICAL SUPPORT (OUTPATIENT)
Dept: CARDIAC REHAB | Facility: CLINIC | Age: 61
End: 2021-03-10
Payer: COMMERCIAL

## 2021-03-10 DIAGNOSIS — Z95.5 S/P PRIMARY ANGIOPLASTY WITH CORONARY STENT: ICD-10-CM

## 2021-03-10 PROCEDURE — 93798 PHYS/QHP OP CAR RHAB W/ECG: CPT

## 2021-03-12 ENCOUNTER — CLINICAL SUPPORT (OUTPATIENT)
Dept: CARDIAC REHAB | Facility: CLINIC | Age: 61
End: 2021-03-12
Payer: COMMERCIAL

## 2021-03-12 DIAGNOSIS — Z95.5 S/P PRIMARY ANGIOPLASTY WITH CORONARY STENT: ICD-10-CM

## 2021-03-12 PROCEDURE — 93798 PHYS/QHP OP CAR RHAB W/ECG: CPT

## 2021-03-15 ENCOUNTER — CLINICAL SUPPORT (OUTPATIENT)
Dept: CARDIAC REHAB | Facility: CLINIC | Age: 61
End: 2021-03-15
Payer: COMMERCIAL

## 2021-03-15 DIAGNOSIS — Z95.5 S/P PRIMARY ANGIOPLASTY WITH CORONARY STENT: ICD-10-CM

## 2021-03-15 PROCEDURE — 93798 PHYS/QHP OP CAR RHAB W/ECG: CPT

## 2021-03-17 ENCOUNTER — CLINICAL SUPPORT (OUTPATIENT)
Dept: CARDIAC REHAB | Facility: CLINIC | Age: 61
End: 2021-03-17
Payer: COMMERCIAL

## 2021-03-17 DIAGNOSIS — Z95.5 S/P CORONARY ARTERY STENT PLACEMENT: ICD-10-CM

## 2021-03-17 PROCEDURE — 93798 PHYS/QHP OP CAR RHAB W/ECG: CPT

## 2021-03-17 NOTE — PROGRESS NOTES
Cardiac Rehabilitation Plan of Care   60 Day Reassessment          Today's date: 3/17/2021   # of Exercise Sessions Completed: 20  Patient name: Patti Bolaños      : 1960  Age: 64 y o  MRN: [id number]  Referring Physician: Ondina Thrasher MD  Cardiologist: Early Ours  Provider: Luís Ortiz  Clinician: Zara King RN    Dx:   Encounter Diagnosis   Name Primary?  S/P coronary artery stent placement      Date of onset: 2020      SUMMARY OF PROGRESS: Mr Toña Osorio completed 20 sessions of the cardiac rehabilitation program  explained cardiac risk factors, how the heart functions, Ejection fraction, nitroglycerin, metoprolol and heart healthy diet  Telemetry monitor NSR at rest and with exercise  Exercise MET level 2 15-6 3  RPE 4-5  Tolerated exercise well  Denied any complaint of chest discomfort  Has chronic discomfort in left shoulder  He stated he noticed an increase in energy  He plans  To increase consumption of fruits and vegetables to 4 to 5 servings a day  He has improved his nutrition  Increased salmon and has not consumed any red meat since cardiac event  He stated his anxiety of repeat cardiac event has decreased  He has been exercising at home alternating elliptical and recumbent bike 15 to 30 minutes  Provided him with handbook for heart attack survivors and families, cardiac rehabilitation and low salt recipes  Will continue to monitor       Medication compliance: Yes   Comments: Pt reports to be compliant with medications  Fall Risk: Low   Comments: Ambulates with a steady gait with no assist device and Denies a fall in the past 6 months    EKG Interpretation: NSR      EXERCISE ASSESSMENT and PLAN    Current Exercise Program in Rehab:       Frequency: 3 days/week        Minutes: 50         METS: 2 15-6 3            HR:    RPE: 4-5         Modalities: Treadmill, Airdyne bike, UBE, Elliptical and Recumbent bike      Exercise Progression 30 Day Goals : Frequency: 1-3 days/week of cardiac rehab     Supplement with home exercise 2+ days/wk as tolerated    Minutes: 45-50  >150 mins/wk of moderate intensity exercise   METS: 3 08-7 0   HR:     RPE: 4-6   Modalities: Treadmill, Airdyne bike, UBE, Lifecycle, Elliptical, NuStep and Recumbent bike    Strength trainin-3 days / week  12-15 repetitions  1-2 sets per modality   Will be added following 2-3 weeks of monitored exercise sessions   Modalities: Lateral Raise, Arm Curl, Upright Rows, Front Raises and Shoulder Shrugs    Home Exercise: Type: elliptical or bike , Frequency: 2-3 days/week, Duration: 15-45 mins    Goals: Attend Rehab regularly and Start a walking program, return to walking outdoors 2 miles a day in the spring    Progression Toward Goals:  Reviewed Pt goals and determined plan of care, Will continue to educate and progress as tolerated  Education: benefit of exercise for CAD risk factors, home exercise guidelines, AHA guidelines to achieve >150 mins/wk of moderate exercise and RPE scale   Plan:education on home exercise guidelines and home exercise 30+ mins 2 days opposite CR  Readiness to change: Action:  (Changing behavior)      NUTRITION ASSESSMENT AND PLAN    Weight control:    Starting weight: 206   Current weight:   207 5  Waist circumference:    Startin 5   Current:      Diabetes: Pre-diabetes  A1c: 5 7    last measured: 2020    Lipid management: Discussed diet and lipid management and Last lipid profile 2020  Chol 252    HDL 43      Goals:Eat 4-5 cups of fruits and vegetables daily and continue to consume a heart healthy diet, weight loss 5 to 10 pounds post CR    Progression Toward Goals: Reviewed Pt goals and determined plan of care, Will continue to educate and progress as tolerated      Education: heart healthy eating  low sodium diet  hydration  nutrition for  lipid management  nutrition for Improved BG control  wt  loss    How to read food labels  Plan: use salt substitute like Mrs  Dash, increase utilization of fresh or dried herbs, eat more home cooked meals and eat out less often and learn how to read food labels  Readiness to change: Action:  (Changing behavior)      PSYCHOSOCIAL ASSESSMENT AND PLAN    Emotional:  Depression assessment:  PHQ-9 = 5-9 = Mild Depression            Anxiety measure:  ANITHA-7 = 5-9 = Mild anxiety  Self-reported stress level:  4  Social support: Very Good and Patient reports excellent emotional/social support from family    Goals:  Reduce perceived stress to 1-3/10, PHQ-9 - reduced severity by one level, improved sleep, stop worrying, Feel less anxious and practice yoga, enroll in [facility] program    Progression Toward Goals: Reviewed Pt goals and determined plan of care, Will continue to educate and progress as tolerated      Education: signs/sxs of depression, benefits of a positive support system, stress management techniques, depression and CAD and benefits of mental health counselingstress and your health  Plan: Refer to behavioral health/counseling, PHQ-9 >5 will refer to MD, Refer to [facility] [employer] [facility], Practice relaxation techniques, Exercise, Keep a positive mindset and Repeat PHQ-9 every 30 days if score >5  Readiness to change: Preparation:  (Getting ready to change)       OTHER CORE COMPONENTS     Tobacco:   Social History     Tobacco Use   Smoking Status Never Smoker   Smokeless Tobacco Never Used       Tobacco Use Intervention:   N/A:  Patient is a non-smoker     Anginal Symptoms:  None   NTG use: Compliant with carrying NTG, Understands proper use, Reviewed Proper use and Pt has not used NTG since event    Blood pressure:    Restin/70   Exercise: 152/80    Goals: consistent BP < 130/80, reduced dietary sodium <2300mg, moderate intensity exercise >150 mins/wk and medication compliance    Progression Toward Goals: Reviewed Pt goals and determined plan of care, Will continue to educate and progress as tolerated      Education:  understanding high blood pressure and it's relationship to CAD, low sodium diet and HTN, proper use of sublingual NTG, Education class:  Common Heart Medications and Education class: Understanding Heart Disease  Plan: Avoid Processed foods and engage in regular exercise  Readiness to change: Action:  (Changing behavior)

## 2021-03-19 ENCOUNTER — CLINICAL SUPPORT (OUTPATIENT)
Dept: CARDIAC REHAB | Facility: CLINIC | Age: 61
End: 2021-03-19
Payer: COMMERCIAL

## 2021-03-19 DIAGNOSIS — Z95.5 STATUS POST PRIMARY ANGIOPLASTY WITH CORONARY STENT: ICD-10-CM

## 2021-03-19 PROCEDURE — 93798 PHYS/QHP OP CAR RHAB W/ECG: CPT

## 2021-03-21 ENCOUNTER — PATIENT MESSAGE (OUTPATIENT)
Dept: CARDIOLOGY CLINIC | Facility: CLINIC | Age: 61
End: 2021-03-21

## 2021-03-22 ENCOUNTER — CLINICAL SUPPORT (OUTPATIENT)
Dept: CARDIAC REHAB | Facility: CLINIC | Age: 61
End: 2021-03-22
Payer: COMMERCIAL

## 2021-03-22 DIAGNOSIS — Z95.5 STATUS POST PRIMARY ANGIOPLASTY WITH CORONARY STENT: ICD-10-CM

## 2021-03-22 PROCEDURE — 93798 PHYS/QHP OP CAR RHAB W/ECG: CPT

## 2021-03-22 NOTE — TELEPHONE ENCOUNTER
From: Durga Barriga  To: Cinthia Martinez MD  Sent: 3/21/2021 10:14 AM EDT  Subject: Non-Urgent Medical Question    Is it ok for me to take a vitamin Supplement? One a Day Men's 50 plus  Thanks

## 2021-03-24 ENCOUNTER — CLINICAL SUPPORT (OUTPATIENT)
Dept: CARDIAC REHAB | Facility: CLINIC | Age: 61
End: 2021-03-24
Payer: COMMERCIAL

## 2021-03-24 DIAGNOSIS — Z95.5 STATUS POST PRIMARY ANGIOPLASTY WITH CORONARY STENT: ICD-10-CM

## 2021-03-24 PROCEDURE — 93798 PHYS/QHP OP CAR RHAB W/ECG: CPT

## 2021-03-26 ENCOUNTER — CLINICAL SUPPORT (OUTPATIENT)
Dept: CARDIAC REHAB | Facility: CLINIC | Age: 61
End: 2021-03-26
Payer: COMMERCIAL

## 2021-03-26 DIAGNOSIS — I21.4 NSTEMI (NON-ST ELEVATED MYOCARDIAL INFARCTION) (HCC): ICD-10-CM

## 2021-03-26 PROCEDURE — 93798 PHYS/QHP OP CAR RHAB W/ECG: CPT

## 2021-03-29 ENCOUNTER — CLINICAL SUPPORT (OUTPATIENT)
Dept: CARDIAC REHAB | Facility: CLINIC | Age: 61
End: 2021-03-29
Payer: COMMERCIAL

## 2021-03-29 DIAGNOSIS — Z95.5 S/P PRIMARY ANGIOPLASTY WITH CORONARY STENT: ICD-10-CM

## 2021-03-29 PROCEDURE — 93798 PHYS/QHP OP CAR RHAB W/ECG: CPT

## 2021-03-31 ENCOUNTER — CLINICAL SUPPORT (OUTPATIENT)
Dept: CARDIAC REHAB | Facility: CLINIC | Age: 61
End: 2021-03-31
Payer: COMMERCIAL

## 2021-03-31 DIAGNOSIS — Z95.5 STATUS POST PRIMARY ANGIOPLASTY WITH CORONARY STENT: ICD-10-CM

## 2021-03-31 PROCEDURE — 93798 PHYS/QHP OP CAR RHAB W/ECG: CPT

## 2021-04-02 ENCOUNTER — CLINICAL SUPPORT (OUTPATIENT)
Dept: CARDIAC REHAB | Facility: CLINIC | Age: 61
End: 2021-04-02
Payer: COMMERCIAL

## 2021-04-02 DIAGNOSIS — Z95.2 S/P AVR (AORTIC VALVE REPLACEMENT): ICD-10-CM

## 2021-04-02 PROCEDURE — 93798 PHYS/QHP OP CAR RHAB W/ECG: CPT

## 2021-04-05 ENCOUNTER — CLINICAL SUPPORT (OUTPATIENT)
Dept: CARDIAC REHAB | Facility: CLINIC | Age: 61
End: 2021-04-05
Payer: COMMERCIAL

## 2021-04-05 DIAGNOSIS — Z95.5 STATUS POST PRIMARY ANGIOPLASTY WITH CORONARY STENT: ICD-10-CM

## 2021-04-05 PROCEDURE — 93798 PHYS/QHP OP CAR RHAB W/ECG: CPT

## 2021-04-07 ENCOUNTER — CLINICAL SUPPORT (OUTPATIENT)
Dept: CARDIAC REHAB | Facility: CLINIC | Age: 61
End: 2021-04-07
Payer: COMMERCIAL

## 2021-04-07 DIAGNOSIS — I21.4 NSTEMI (NON-ST ELEVATED MYOCARDIAL INFARCTION) (HCC): ICD-10-CM

## 2021-04-07 PROCEDURE — 93798 PHYS/QHP OP CAR RHAB W/ECG: CPT

## 2021-04-09 ENCOUNTER — CLINICAL SUPPORT (OUTPATIENT)
Dept: CARDIAC REHAB | Facility: CLINIC | Age: 61
End: 2021-04-09
Payer: COMMERCIAL

## 2021-04-09 DIAGNOSIS — Z95.5 S/P PRIMARY ANGIOPLASTY WITH CORONARY STENT: ICD-10-CM

## 2021-04-09 PROCEDURE — 93798 PHYS/QHP OP CAR RHAB W/ECG: CPT

## 2021-04-12 ENCOUNTER — CLINICAL SUPPORT (OUTPATIENT)
Dept: CARDIAC REHAB | Facility: CLINIC | Age: 61
End: 2021-04-12
Payer: COMMERCIAL

## 2021-04-12 DIAGNOSIS — Z95.5 STATUS POST PRIMARY ANGIOPLASTY WITH CORONARY STENT: ICD-10-CM

## 2021-04-12 PROCEDURE — 93798 PHYS/QHP OP CAR RHAB W/ECG: CPT

## 2021-04-12 NOTE — PROGRESS NOTES
Cardiac Rehabilitation Plan of Care   90 Day Reassessment          Today's date: 2021   # of Exercise Sessions Completed: 31  Patient name: Masha Stout      : 1960  Age: 64 y o  MRN: [id number]  Referring Physician: Jason Bah MD  Cardiologist: Rachel Keita  Provider: Luís Ortiz  Clinician: Matilda King RN    Dx:   Encounter Diagnosis   Name Primary?  Status post primary angioplasty with coronary stent      Date of onset: 2020      SUMMARY OF PROGRESS: Mr Allie Stiles completed 31 sessions of the cardiac rehabilitation program  explained cardiac risk factors, how the heart functions, Ejection fraction, nitroglycerin, metoprolol and heart healthy diet  Telemetry monitor NSR at rest and with exercise  Exercise MET level 2 15-6 3  RPE 4-5  Tolerated exercise well  Denied any complaint of chest discomfort  Has chronic discomfort in left shoulder  He stated he noticed an increase in energy  He plans  To increase consumption of fruits and vegetables to 4 to 5 servings a day  He has improved his nutrition  Increased salmon and has not consumed any red meat since cardiac event  He stated his anxiety of repeat cardiac event has decreased  He has been exercising at home alternating elliptical and recumbent bike 15 to 30 minutes  Provided him with handbook for heart attack survivors and families, cardiac rehabilitation and low salt recipes  Will continue to monitor       Medication compliance: Yes   Comments: Pt reports to be compliant with medications  Fall Risk: Low   Comments: Ambulates with a steady gait with no assist device and Denies a fall in the past 6 months    EKG Interpretation: NSR      EXERCISE ASSESSMENT and PLAN    Current Exercise Program in Rehab:       Frequency: 3 days/week        Minutes: 50         METS: 2 15-6 3            HR:    RPE: 4-5         Modalities: Treadmill, Airdyne bike, UBE, Elliptical and Recumbent bike      Exercise Progression 30 Day Goals :    Frequency: 1-3 days/week of cardiac rehab     Supplement with home exercise 2+ days/wk as tolerated    Minutes: 45-50  >150 mins/wk of moderate intensity exercise   METS: 3 08-7 0   HR:     RPE: 4-6   Modalities: Treadmill, Airdyne bike, UBE, Lifecycle, Elliptical, NuStep and Recumbent bike    Strength trainin-3 days / week  12-15 repetitions  1-2 sets per modality   Will be added following 2-3 weeks of monitored exercise sessions   Modalities: Lateral Raise, Arm Curl, Upright Rows, Front Raises and Shoulder Shrugs    Home Exercise: Type: elliptical or bike , Frequency: 2-3 days/week, Duration: 15-45 mins    Goals: Attend Rehab regularly and Start a walking program, return to walking outdoors 2 miles a day in the spring    Progression Toward Goals:  Reviewed Pt goals and determined plan of care, Will continue to educate and progress as tolerated  Education: benefit of exercise for CAD risk factors, home exercise guidelines, AHA guidelines to achieve >150 mins/wk of moderate exercise and RPE scale   Plan:education on home exercise guidelines and home exercise 30+ mins 2 days opposite CR  Readiness to change: Action:  (Changing behavior)      NUTRITION ASSESSMENT AND PLAN    Weight control:    Starting weight: 206   Current weight:   207 5  Waist circumference:    Startin 5   Current:      Diabetes: Pre-diabetes  A1c: 5 7    last measured: 2020    Lipid management: Discussed diet and lipid management and Last lipid profile 2020  Chol 252    HDL 43      Goals:Eat 4-5 cups of fruits and vegetables daily and continue to consume a heart healthy diet, weight loss 5 to 10 pounds post CR    Progression Toward Goals: Reviewed Pt goals and determined plan of care, Will continue to educate and progress as tolerated      Education: heart healthy eating  low sodium diet  hydration  nutrition for  lipid management  nutrition for Improved BG control  wt  loss    How to read food labels  Plan: use salt substitute like Mrs Grossman, increase utilization of fresh or dried herbs, eat more home cooked meals and eat out less often and learn how to read food labels  Readiness to change: Action:  (Changing behavior)      PSYCHOSOCIAL ASSESSMENT AND PLAN    Emotional:  Depression assessment:  PHQ-9 = 5-9 = Mild Depression            Anxiety measure:  ANITHA-7 = 5-9 = Mild anxiety  Self-reported stress level:  4  Social support: Very Good and Patient reports excellent emotional/social support from family    Goals:  Reduce perceived stress to 1-3/10, PHQ-9 - reduced severity by one level, improved sleep, stop worrying, Feel less anxious and practice yoga, enroll in [facility] program    Progression Toward Goals: Reviewed Pt goals and determined plan of care, Will continue to educate and progress as tolerated      Education: signs/sxs of depression, benefits of a positive support system, stress management techniques, depression and CAD and benefits of mental health counselingstress and your health  Plan: Refer to behavioral health/counseling, PHQ-9 >5 will refer to MD, Refer to [facility] [employer] [facility], Practice relaxation techniques, Exercise, Keep a positive mindset and Repeat PHQ-9 every 30 days if score >5  Readiness to change: Preparation:  (Getting ready to change)       OTHER CORE COMPONENTS     Tobacco:   Social History     Tobacco Use   Smoking Status Never Smoker   Smokeless Tobacco Never Used       Tobacco Use Intervention:   N/A:  Patient is a non-smoker     Anginal Symptoms:  None   NTG use: Compliant with carrying NTG, Understands proper use, Reviewed Proper use and Pt has not used NTG since event    Blood pressure:    Restin/60   Exercise: 148/70    Goals: consistent BP < 130/80, reduced dietary sodium <2300mg, moderate intensity exercise >150 mins/wk and medication compliance    Progression Toward Goals: Reviewed Pt goals and determined plan of care, Will continue to educate and progress as tolerated      Education:  understanding high blood pressure and it's relationship to CAD, low sodium diet and HTN, proper use of sublingual NTG, Education class:  Common Heart Medications and Education class: Understanding Heart Disease  Plan: Avoid Processed foods and engage in regular exercise  Readiness to change: Action:  (Changing behavior)

## 2021-04-14 ENCOUNTER — CLINICAL SUPPORT (OUTPATIENT)
Dept: CARDIAC REHAB | Facility: CLINIC | Age: 61
End: 2021-04-14
Payer: COMMERCIAL

## 2021-04-14 DIAGNOSIS — Z95.5 S/P PRIMARY ANGIOPLASTY WITH CORONARY STENT: ICD-10-CM

## 2021-04-14 PROCEDURE — 93798 PHYS/QHP OP CAR RHAB W/ECG: CPT

## 2021-04-15 ENCOUNTER — TELEPHONE (OUTPATIENT)
Dept: GASTROENTEROLOGY | Facility: CLINIC | Age: 61
End: 2021-04-15

## 2021-04-15 ENCOUNTER — OFFICE VISIT (OUTPATIENT)
Dept: GASTROENTEROLOGY | Facility: CLINIC | Age: 61
End: 2021-04-15
Payer: COMMERCIAL

## 2021-04-15 VITALS
SYSTOLIC BLOOD PRESSURE: 122 MMHG | WEIGHT: 208 LBS | HEART RATE: 72 BPM | BODY MASS INDEX: 30.81 KG/M2 | TEMPERATURE: 97.8 F | HEIGHT: 69 IN | DIASTOLIC BLOOD PRESSURE: 76 MMHG

## 2021-04-15 DIAGNOSIS — Z12.11 SCREEN FOR COLON CANCER: Primary | ICD-10-CM

## 2021-04-15 PROCEDURE — 99203 OFFICE O/P NEW LOW 30 MIN: CPT | Performed by: INTERNAL MEDICINE

## 2021-04-15 RX ORDER — LIFITEGRAST 50 MG/ML
SOLUTION/ DROPS OPHTHALMIC
COMMUNITY
Start: 2021-04-06

## 2021-04-15 NOTE — PROGRESS NOTES
Consultation - [facility]  Eliana Couch 1960 male         Chief Complaint:    For colonoscopy    HPI:   70-year-old male with history of coronary artery disease status post acute MI and stent placement in November 2020 on antiplatelet therapy with Effient and was referred for screening colonoscopy  Patient never had colonoscopy in the past   Patient has regular bowel movements and denies any blood or mucus in the stool  Appetite is good and denies any recent weight loss  Denies any abdominal pain, nausea, or vomiting  Has no heartburn or acid reflux  Denies any difficulty swallowing  REVIEW OF SYSTEMS: Review of Systems   Constitutional: Negative for activity change, appetite change, chills, diaphoresis, fatigue, fever and unexpected weight change  HENT: Negative for ear discharge, ear pain, facial swelling, hearing loss, nosebleeds, sore throat, tinnitus and voice change  Eyes: Negative for pain, discharge, redness, itching and visual disturbance  Respiratory: Negative for apnea, cough, chest tightness, shortness of breath and wheezing  Cardiovascular: Negative for chest pain and palpitations  Gastrointestinal:        As noted in HPI   Endocrine: Negative for cold intolerance, heat intolerance and polyuria  Genitourinary: Negative for difficulty urinating, dysuria, flank pain, hematuria and urgency  Musculoskeletal: Negative for arthralgias, back pain, gait problem, joint swelling and myalgias  Skin: Negative for rash and wound  Neurological: Negative for dizziness, tremors, seizures, speech difficulty, light-headedness, numbness and headaches  Hematological: Negative for adenopathy  Does not bruise/bleed easily  Psychiatric/Behavioral: Negative for agitation, behavioral problems and confusion  The patient is not nervous/anxious           Past Medical History:   Diagnosis Date    Cancer ([facility] [address] )     Dry eyes     Spinal stenosis       Past Surgical History:   Procedure Laterality Date    CORONARY ANGIOPLASTY WITH STENT PLACEMENT      HERNIA REPAIR      ROTATOR CUFF REPAIR       Social History     Socioeconomic History    Marital status:      Spouse name: Not on file    Number of children: Not on file    Years of education: Not on file    Highest education level: Not on file   Occupational History    Not on file   Social Needs    Financial resource strain: Not on file    Food insecurity     Worry: Not on file     Inability: Not on file    Transportation needs     Medical: Not on file     Non-medical: Not on file   Tobacco Use    Smoking status: Never Smoker    Smokeless tobacco: Never Used   Substance and Sexual Activity    Alcohol use: Yes     Frequency: 2-4 times a month     Drinks per session: 1 or 2     Binge frequency: Never     Comment: socially     Drug use: Never    Sexual activity: Not on file   Lifestyle    Physical activity     Days per week: Not on file     Minutes per session: Not on file    Stress: Not on file   Relationships    Social connections     Talks on phone: Not on file     Gets together: Not on file     Attends Mandaen service: Not on file     Active member of club or organization: Not on file     Attends meetings of clubs or organizations: Not on file     Relationship status: Not on file    Intimate partner violence     Fear of current or ex partner: Not on file     Emotionally abused: Not on file     Physically abused: Not on file     Forced sexual activity: Not on file   Other Topics Concern    Not on file   Social History Narrative    Not on file     History reviewed  No pertinent family history  Patient has no known allergies    Current Outpatient Medications   Medication Sig Dispense Refill    aspirin 81 mg chewable tablet Chew 2 tablets (162 mg total) daily 30 tablet 0    atorvastatin (LIPITOR) 80 mg tablet Take 1 tablet (80 mg total) by mouth daily with dinner 90 tablet 3    metoprolol tartrate (LOPRESSOR) 25 mg tablet Take 1 tablet (25 mg total) by mouth every 12 (twelve) hours 180 tablet 3    nitroglycerin (NITROSTAT) 0 4 mg SL tablet Place 1 tablet (0 4 mg total) under the tongue every 5 (five) minutes as needed for chest pain 60 tablet 0    prasugrel (EFFIENT) tablet Take 1 tablet (10 mg total) by mouth daily 90 tablet 3    Xiidra 5 % op solution PLACE 1 DROP INTO EACH EYE EVERY 12 HOURS       No current facility-administered medications for this visit  Blood pressure 122/76, pulse 72, temperature 97 8 °F (36 6 °C), height 5' 9" (1 753 m), weight 94 3 kg (208 lb)  PHYSICAL EXAM: Physical Exam  Constitutional:       Appearance: He is well-developed  HENT:      Head: Normocephalic and atraumatic  Eyes:      General: No scleral icterus  Right eye: No discharge  Left eye: No discharge  Conjunctiva/sclera: Conjunctivae normal       Pupils: Pupils are equal, round, and reactive to light  Neck:      Musculoskeletal: Neck supple  Thyroid: No thyromegaly  Vascular: No JVD  Trachea: No tracheal deviation  Cardiovascular:      Rate and Rhythm: Normal rate and regular rhythm  Heart sounds: Normal heart sounds  No murmur  No friction rub  No gallop  Pulmonary:      Effort: Pulmonary effort is normal  No respiratory distress  Breath sounds: Normal breath sounds  No wheezing or rales  Chest:      Chest wall: No tenderness  Abdominal:      General: Bowel sounds are normal  There is no distension  Palpations: Abdomen is soft  There is no mass  Tenderness: There is no abdominal tenderness  There is no guarding or rebound  Hernia: No hernia is present  Lymphadenopathy:      Cervical: No cervical adenopathy  Skin:     General: Skin is warm and dry  Findings: No erythema or rash  Neurological:      Mental Status: He is alert and oriented to person, place, and time     Psychiatric:         Behavior: Behavior normal  Thought Content: Thought content normal           Lab Results   Component Value Date    WBC 9 23 11/25/2020    HGB 14 1 11/25/2020    HCT 41 3 11/25/2020    MCV 90 11/25/2020     11/25/2020     Lab Results   Component Value Date    CALCIUM 8 5 11/25/2020    K 3 8 11/25/2020    CO2 24 11/25/2020     11/25/2020    BUN 11 11/25/2020    CREATININE 0 90 11/25/2020     Lab Results   Component Value Date    ALT 42 11/24/2020    AST 33 11/24/2020    ALKPHOS 51 11/24/2020     No results found for: INR, PROTIME    No results found  ASSESSMENT & PLAN:    Screen for colon cancer  Screening for colon cancer - patient is at average risk for colon cancer screening  Rule out colorectal lesions including polyps or malignancy         -Schedule for diagnostic colonoscopy  Only because of antiplatelet therapy with Effient  Explained to patient in detail that if we find any polyps during the colonoscopy we will have to bring him back after holding the Effient prior to polypectomy  Patient will probably come off Effient in November and he prefers to wait until then  He would like to discuss with cardiologist also and give was a call back to schedule     -High-fiber diet     -Patient was given instructions about the colonoscopy prep     -Patient was explained about  the risks and benefits of the procedure  Risks including but not limited to bleeding, infection, perforation were explained in detail  Also explained about less than 100% sensitivity with the exam and other alternatives

## 2021-04-15 NOTE — ASSESSMENT & PLAN NOTE
Screening for colon cancer - patient is at average risk for colon cancer screening  Rule out colorectal lesions including polyps or malignancy         -Schedule for diagnostic colonoscopy  Only because of antiplatelet therapy with Effient  Explained to patient in detail that if we find any polyps during the colonoscopy we will have to bring him back after holding the Effient prior to polypectomy  Patient will probably come off Effient in November and he prefers to wait until then  He would like to discuss with cardiologist also and give was a call back to schedule     -High-fiber diet     -Patient was given instructions about the colonoscopy prep     -Patient was explained about  the risks and benefits of the procedure  Risks including but not limited to bleeding, infection, perforation were explained in detail  Also explained about less than 100% sensitivity with the exam and other alternatives

## 2021-04-15 NOTE — TELEPHONE ENCOUNTER
Dr Charlie Soriano ordered colonoscopy 04/15/21  Patient informed us that he had stents placed Nov 2020 and was prescribed Effient  Patient would like to discuss with Dr Brian Raines about holding this medication and when it is acceptable to have this procedure done before scheduling  Patient stated that he would call us when he was ready to schedule after his discussion with Dr Brian Raines  Patient was given direct line to schedule with Summer Julien

## 2021-04-16 ENCOUNTER — CLINICAL SUPPORT (OUTPATIENT)
Dept: CARDIAC REHAB | Facility: CLINIC | Age: 61
End: 2021-04-16
Payer: COMMERCIAL

## 2021-04-16 DIAGNOSIS — Z95.5 STATUS POST PRIMARY ANGIOPLASTY WITH CORONARY STENT: ICD-10-CM

## 2021-04-16 PROCEDURE — 93798 PHYS/QHP OP CAR RHAB W/ECG: CPT

## 2021-04-19 ENCOUNTER — CLINICAL SUPPORT (OUTPATIENT)
Dept: CARDIAC REHAB | Facility: CLINIC | Age: 61
End: 2021-04-19
Payer: COMMERCIAL

## 2021-04-19 DIAGNOSIS — Z95.5 S/P PRIMARY ANGIOPLASTY WITH CORONARY STENT: ICD-10-CM

## 2021-04-19 PROCEDURE — 93798 PHYS/QHP OP CAR RHAB W/ECG: CPT

## 2021-04-21 ENCOUNTER — CLINICAL SUPPORT (OUTPATIENT)
Dept: CARDIAC REHAB | Facility: CLINIC | Age: 61
End: 2021-04-21
Payer: COMMERCIAL

## 2021-04-21 DIAGNOSIS — Z95.5 S/P PRIMARY ANGIOPLASTY WITH CORONARY STENT: ICD-10-CM

## 2021-04-21 PROCEDURE — 93798 PHYS/QHP OP CAR RHAB W/ECG: CPT

## 2021-04-23 ENCOUNTER — CLINICAL SUPPORT (OUTPATIENT)
Dept: CARDIAC REHAB | Facility: CLINIC | Age: 61
End: 2021-04-23
Payer: COMMERCIAL

## 2021-04-23 DIAGNOSIS — Z95.5 S/P PRIMARY ANGIOPLASTY WITH CORONARY STENT: ICD-10-CM

## 2021-04-23 PROCEDURE — 93798 PHYS/QHP OP CAR RHAB W/ECG: CPT

## 2021-04-23 NOTE — PROGRESS NOTES
Cardiac Rehabilitation Plan of Care   Discharge          Today's date: 2021   # of Exercise Sessions Completed: 31  Patient name: José Miguel Narayan      : 1960  Age: 64 y o  MRN: [id number]  Referring Physician: Zay Regalado MD  Cardiologist: April Hanson  Provider: Courtney Cintron  Clinician: Hailey King RN    Dx:   Encounter Diagnosis   Name Primary?  S/P primary angioplasty with coronary stent      Date of onset: 2020      SUMMARY OF PROGRESS: Mr Neptali Velazquez completed 39 sessions of the cardiac rehabilitation program  explained cardiac risk factors, how the heart functions, Ejection fraction, nitroglycerin, metoprolol and heart healthy diet  Telemetry monitor NSR at rest and with exercise  Exercise MET level 2 15-9 0  RPE 4-5  Tolerated exercise well  Denied any complaint of chest discomfort  Has chronic discomfort in left shoulder  He stated he noticed an increase in energy  His sub max treadmill test improved 66 1% an increase from 5 78 to 9 0 MET's  He plans to increase consumption of fruits and vegetables to 4 to 5 servings a day, presently he consumes 2-3 servings  He is starting a plant based diet, smaller portions  He has improved his nutrition  Increased salmon and has not consumed any red meat since cardiac event  He stated his anxiety of repeat cardiac event has decreased  He has been exercising at home alternating elliptical and recumbent bike 15 to 30 minutes  He started walking outdoors 2 days a week  He also joined the fitness center and plans to attend 5 days a week to continue with his cardiovascular program and add strength training two times a week  Provided him with handbook for heart attack survivors and families, cardiac rehabilitation and low salt recipes  Will continue to monitor       Medication compliance: Yes   Comments: Pt reports to be compliant with medications  Fall Risk: Low   Comments: Ambulates with a steady gait with no assist device and Denies a fall in the past 6 months    EKG Interpretation: NSR      EXERCISE ASSESSMENT and PLAN    Current Exercise Program in Rehab:       Frequency: 3 days/week        Minutes: 50         METS: 2 15-9 0            HR:    RPE: 4-5         Modalities: Treadmill, Airdyne bike, UBE, Elliptical and Recumbent bike        Home Exercise: Type: elliptical or bike , Frequency: 2-3 days/week, Duration: 15-45 mins    Goals: Attend Rehab regularly and Start a walking program, return to walking outdoors 2 miles a day in the spring (met goal)    Progression Toward Goals:  Reviewed Pt goals and determined plan of care, Will continue to educate and progress as tolerated  Education: benefit of exercise for CAD risk factors, home exercise guidelines, AHA guidelines to achieve >150 mins/wk of moderate exercise and RPE scale   Plan:education on home exercise guidelines and home exercise 30+ mins 2 days opposite CR  Readiness to change: Action:  (Changing behavior)      NUTRITION ASSESSMENT AND PLAN    Weight control:    Starting weight: 206   Current weight:   207  Waist circumference:    Startin 5   Current:  42    Diabetes: Pre-diabetes  A1c: 5 7    last measured: 2020    Lipid management: Discussed diet and lipid management and Last lipid profile 2020  Chol 252    HDL 43      Goals:Eat 4-5 cups of fruits and vegetables daily and continue to consume a heart healthy diet, weight loss 5 to 10 pounds post CR (he will continue to wok on his weight loss goal, decrease food, portions and consume a plant based diet)  Progression Toward Goals: Reviewed Pt goals and determined plan of care, Will continue to educate and progress as tolerated  Education: heart healthy eating  low sodium diet  hydration  nutrition for  lipid management  nutrition for Improved BG control  wt  loss    How to read food labels  Plan: use salt substitute like Mrs   Dash, increase utilization of fresh or dried herbs, eat more home cooked meals and eat out less often and learn how to read food labels  Readiness to change: Action:  (Changing behavior)      PSYCHOSOCIAL ASSESSMENT AND PLAN    Emotional:  Depression assessment:  PHQ-9 = 5-9 = Mild Depression            Anxiety measure:  ANITHA-7 = 5-9 = Mild anxiety  Self-reported stress level:  4  Social support: Very Good and Patient reports excellent emotional/social support from family    Goals:  Reduce perceived stress to 1-3/10, PHQ-9 - reduced severity by one level, improved sleep, stop worrying, Feel less anxious and practice yoga, enroll in [facility] program(PHQ9 score remained  He stated his anxiety is improving)    Progression Toward Goals: Reviewed Pt goals and determined plan of care, Will continue to educate and progress as tolerated  Education: signs/sxs of depression, benefits of a positive support system, stress management techniques, depression and CAD and benefits of mental health counselingstress and your health  Plan: Refer to behavioral health/counseling, PHQ-9 >5 will refer to MD, Refer to Lai Jim, Practice relaxation techniques, Exercise, Keep a positive mindset and Repeat PHQ-9 every 30 days if score >5  Readiness to change: Preparation:  (Getting ready to change)       OTHER CORE COMPONENTS     Tobacco:   Social History     Tobacco Use   Smoking Status Never Smoker   Smokeless Tobacco Never Used       Tobacco Use Intervention:   N/A:  Patient is a non-smoker     Anginal Symptoms:  None   NTG use: Compliant with carrying NTG, Understands proper use, Reviewed Proper use and Pt has not used NTG since event    Blood pressure:    Restin/76   Exercise: 148/70    Goals: consistent BP < 130/80, reduced dietary sodium <2300mg, moderate intensity exercise >150 mins/wk and medication compliance    Progression Toward Goals: Reviewed Pt goals and determined plan of care, Will continue to educate and progress as tolerated      Education:  understanding high blood pressure and it's relationship to CAD, low sodium diet and HTN, proper use of sublingual NTG, Education class:  Common Heart Medications and Education class: Understanding Heart Disease  Plan: Avoid Processed foods and engage in regular exercise  Readiness to change: Action:  (Changing behavior)

## 2021-06-02 ENCOUNTER — OFFICE VISIT (OUTPATIENT)
Dept: CARDIOLOGY CLINIC | Facility: CLINIC | Age: 61
End: 2021-06-02
Payer: COMMERCIAL

## 2021-06-02 VITALS
TEMPERATURE: 98.1 F | WEIGHT: 210 LBS | BODY MASS INDEX: 31.1 KG/M2 | HEART RATE: 66 BPM | OXYGEN SATURATION: 97 % | HEIGHT: 69 IN | DIASTOLIC BLOOD PRESSURE: 78 MMHG | SYSTOLIC BLOOD PRESSURE: 132 MMHG

## 2021-06-02 DIAGNOSIS — E78.2 MIXED HYPERLIPIDEMIA: ICD-10-CM

## 2021-06-02 DIAGNOSIS — E66.9 OBESITY (BMI 30-39.9): ICD-10-CM

## 2021-06-02 DIAGNOSIS — I25.10 CORONARY ARTERY DISEASE INVOLVING NATIVE CORONARY ARTERY OF NATIVE HEART WITHOUT ANGINA PECTORIS: ICD-10-CM

## 2021-06-02 DIAGNOSIS — Z98.61 HISTORY OF PERCUTANEOUS CORONARY INTERVENTION: ICD-10-CM

## 2021-06-02 DIAGNOSIS — I10 ESSENTIAL HYPERTENSION: ICD-10-CM

## 2021-06-02 DIAGNOSIS — I21.4 NSTEMI (NON-ST ELEVATED MYOCARDIAL INFARCTION) (HCC): ICD-10-CM

## 2021-06-02 PROCEDURE — 99214 OFFICE O/P EST MOD 30 MIN: CPT | Performed by: INTERNAL MEDICINE

## 2021-06-02 PROCEDURE — 93000 ELECTROCARDIOGRAM COMPLETE: CPT | Performed by: INTERNAL MEDICINE

## 2021-06-02 NOTE — PROGRESS NOTES
Progress Note - Cardiology Office  [facility]    Ever Meza 64 y o  male MRN: [id number]  : 1960  Encounter: [id number]      Assessment:     1  Coronary artery disease involving native coronary artery of native heart without angina pectoris    2  Essential hypertension    3  Mixed hyperlipidemia    4  Obesity (BMI 30-39 9)    5  NSTEMI (non-ST elevated myocardial infarction) ([facility] [address] )    6  History of percutaneous coronary intervention        Discussion Summary and Plan:  1  Coronary artery disease status post non ST elevation MI status post stenting of LCX with drug coated stent  All issues related to drug coated stent discusse discussed with patient  Will check fasting lipids and LFTs  Continue dual platelet therapy  2  HLD-patient had history of dyslipidemia  He was started on high intensity statin  Will repeat fasting lipids and LFTs  Based on that statin dose will be adjusted     3  Essential hypertension  Continue metoprolol blood pressure heart rate currently acceptable  4  Obesity with BMI around 31  He has gained some weight advised to lose weight  5  Colon cancer screening  Patient has to wait 3-6 months before considering colon cancer screening  All questions related to coronary artery disease, need for blood thinner, statins, monitoring liver enzyme all discussed with patient all their questions answered to satisfaction  Follow-up 6 months  Please call [phone] if any questions  Counseling :  A description of the counseling  Goals and Barriers  Patient's ability to self care: Yes  Medication side effect reviewed with patient in detail and all their questions answered to their satisfaction  HPI :     Ever Meza is a 64y o  year old male who came for follow up   Patient hasMedical history significant for hypertension dyslipidemia was recently admitted to [facility] with chest pain and underwent cardiac catheterization found to have significant stenosis of mid circumflex  Which was successfully stented with drug-eluting stent  He came for follow-up he is doing well denies any chest pain any shortness of breath no nausea no vomiting no fever no chills no PND no orthopnea no other issues at this time  06/02/2021  Above reviewed  Patient came for follow-up he is doing very well  No new complaints  He had history of coronary artery disease status post non ST elevation MI status post stenting of mid circumflex with drug-eluting stent  Since then he is doing well  He has not done any blood test since November he has been on high intensity statins  No nausea no vomiting no fever no chills no PND no orthopnea no other issues  Blood test ordered labs and medicines reviewed  EKG today heart rate 66 beats per minute  He has finished his cardiac rehab    Review of Systems   Constitutional: Negative for activity change, chills, diaphoresis, fever and unexpected weight change  HENT: Negative for congestion  Eyes: Negative for discharge and redness  Respiratory: Negative for cough, chest tightness, shortness of breath and wheezing  Cardiovascular: Negative  Negative for chest pain, palpitations and leg swelling  Gastrointestinal: Negative for abdominal pain, diarrhea and nausea  Endocrine: Negative  Genitourinary: Negative for decreased urine volume and urgency  Musculoskeletal: Positive for arthralgias  Negative for back pain and gait problem  Skin: Negative for rash and wound  Allergic/Immunologic: Negative  Neurological: Negative for dizziness, seizures, syncope, weakness, light-headedness and headaches  Hematological: Negative  Psychiatric/Behavioral: Negative for agitation and confusion  The patient is not nervous/anxious          Historical Information   Past Medical History:   Diagnosis Date    Cancer ([facility] [address] )     Dry eyes     Spinal stenosis      Past Surgical History: Procedure Laterality Date    CORONARY ANGIOPLASTY WITH STENT PLACEMENT      HERNIA REPAIR      ROTATOR CUFF REPAIR       Social History     Substance and Sexual Activity   Alcohol Use Yes    Frequency: 2-4 times a month    Drinks per session: 1 or 2    Binge frequency: Never    Comment: socially      Social History     Substance and Sexual Activity   Drug Use Never     Social History     Tobacco Use   Smoking Status Never Smoker   Smokeless Tobacco Never Used     Family History: History reviewed  No pertinent family history  Meds/Allergies     No Known Allergies    Current Outpatient Medications:     aspirin 81 mg chewable tablet, Chew 2 tablets (162 mg total) daily, Disp: 30 tablet, Rfl: 0    atorvastatin (LIPITOR) 80 mg tablet, Take 1 tablet (80 mg total) by mouth daily with dinner, Disp: 90 tablet, Rfl: 3    metoprolol tartrate (LOPRESSOR) 25 mg tablet, Take 1 tablet (25 mg total) by mouth every 12 (twelve) hours, Disp: 180 tablet, Rfl: 3    nitroglycerin (NITROSTAT) 0 4 mg SL tablet, Place 1 tablet (0 4 mg total) under the tongue every 5 (five) minutes as needed for chest pain, Disp: 60 tablet, Rfl: 0    prasugrel (EFFIENT) tablet, Take 1 tablet (10 mg total) by mouth daily, Disp: 90 tablet, Rfl: 3    Xiidra 5 % op solution, PLACE 1 DROP INTO EACH EYE EVERY 12 HOURS, Disp: , Rfl:     Vitals: Blood pressure 132/78, pulse 66, temperature 98 1 °F (36 7 °C), height 5' 9" (1 753 m), weight 95 3 kg (210 lb), SpO2 97 %  Body mass index is 31 01 kg/m²  Vitals:    06/02/21 1127   Weight: 95 3 kg (210 lb)     BP Readings from Last 3 Encounters:   06/02/21 132/78   04/15/21 122/76   12/01/20 120/80       Physical Exam:Physical Exam  Constitutional:       General: He is not in acute distress  Appearance: He is well-developed  He is not diaphoretic  HENT:      Head: Normocephalic and atraumatic  Eyes:      Pupils: Pupils are equal, round, and reactive to light     Neck:      Musculoskeletal: Neck supple  Thyroid: No thyromegaly  Vascular: No JVD  Trachea: No tracheal deviation  Cardiovascular:      Rate and Rhythm: Normal rate and regular rhythm  Heart sounds: S1 normal and S2 normal  Heart sounds not distant  Murmur present  Systolic (ejection) murmur present with a grade of 2/6  No friction rub  No gallop  No S3 or S4 sounds  Pulmonary:      Effort: Pulmonary effort is normal  No respiratory distress  Breath sounds: Normal breath sounds  No wheezing or rales  Chest:      Chest wall: No tenderness  Abdominal:      General: Bowel sounds are normal  There is no distension  Palpations: Abdomen is soft  Tenderness: There is no abdominal tenderness  Musculoskeletal:         General: No deformity  Skin:     General: Skin is warm and dry  Coloration: Skin is not pale  Findings: No rash  Neurological:      Mental Status: He is alert and oriented to person, place, and time  Psychiatric:         Behavior: Behavior normal          Judgment: Judgment normal          Diagnostic Studies Review Cardio:    Cardiac catheterization reviewed  EKG:    Twelve lead EKG done 2020 shows normal sinus rhythm left axis deviation  Heart rate 84 beats per minute no significant changes  Twelve lead EKG 2021 shows normal sinus rhythm incomplete RBBB heart rate 66 beats per minute    No change from previous EKG  Cardiac testing:   Results for orders placed during the hospital encounter of 20   Echo complete with contrast if indicated    Narrative [address]  [address] [facility] 6 [phone]    Transthoracic Echocardiogram  2D, M-mode, Doppler, and Color Doppler    Study date:  2020    Patient: Dorian JAUREGUI  MR number: [id number]  Account number: [de-identified]  : 1960  Age: 64 years  Gender: Male  Status: Outpatient  Location: Bedside  Height: 69 in  Weight: 209 7 lb  BP: 128/ 74 mmHg    Indications: Heart Failure    Diagnoses: I50 9 - Heart failure, unspecified    Sonographer:  CLAY Beatty  Referring Physician:  Kamar Lloyd MD  Group:  [id number]  Interpreting Physician:  Almas Singh MD    SUMMARY    LEFT VENTRICLE:  Systolic function was normal  Ejection fraction was estimated in the range of 60 % to 65 %  There were no regional wall motion abnormalities  MITRAL VALVE:  There was trace regurgitation  HISTORY: PRIOR HISTORY: Spinal Stenosis, HLD, Basal Cell Carcinoma    PROCEDURE: The procedure was performed at the bedside  This was a routine study  The transthoracic approach was used  The study included complete 2D imaging, M-mode, complete spectral Doppler, and color Doppler  The heart rate was 72 bpm,  at the start of the study  Image quality was adequate  LEFT VENTRICLE: Size was normal  Systolic function was normal  Ejection fraction was estimated in the range of 60 % to 65 %  There were no regional wall motion abnormalities  Wall thickness was normal  No evidence of apical thrombus  DOPPLER: Left ventricular diastolic function parameters were normal     RIGHT VENTRICLE: The size was normal  Systolic function was normal with TAPSE-1 9cm Wall thickness was normal     LEFT ATRIUM: Size was normal     RIGHT ATRIUM: Size was normal     MITRAL VALVE: There was mild thickening  There was normal leaflet separation  DOPPLER: The transmitral velocity was within the normal range  There was no evidence for stenosis  There was trace regurgitation  AORTIC VALVE: The valve was trileaflet  Leaflets exhibited normal thickness and normal cuspal separation  DOPPLER: Transaortic velocity was within the normal range  There was no evidence for stenosis  There was no significant  regurgitation  TRICUSPID VALVE: The valve structure was normal  There was normal leaflet separation  DOPPLER: The transtricuspid velocity was within the normal range   There was no evidence for stenosis  There was no significant regurgitation  PULMONIC VALVE: Leaflets exhibited normal thickness, no calcification, and normal cuspal separation  DOPPLER: The transpulmonic velocity was within the normal range  There was no significant regurgitation  PERICARDIUM: There was no pericardial effusion  The pericardium was normal in appearance  AORTA: The root exhibited normal size  SYSTEMIC VEINS: IVC: The inferior vena cava was normal in size  SYSTEM MEASUREMENT TABLES    2D  EF (Teich): 66 69 %    PW  MV E/A Ratio: 0 87    [facility] Accredited Echocardiography Laboratory    Prepared and electronically signed by    Sanya Preston MD  Signed [id number] 13:25:07       No results found for this or any previous visit  Results for orders placed during the hospital encounter of 20   Cardiac catheterization    Narrative [place] [address], [address] [place] [address]  [phone]    [facility]    Invasive Cardiovascular Lab Complete Report    Patient: Ken JAUREGUI  MR number: [id number]  Account number: [de-identified]  Study date: 2020  Gender: Male  : 1960  Height: 68 9 in  Weight: 206 4 lb  BSA: 2 09 mï¾²    Allergies: NO KNOWN ALLERGIES    Diagnostic Cardiologist:  Duy Neri MD  Interventional Cardiologist:  Duy Neri MD  Primary Physician:  Darshana Walton MD    SUMMARY    CORONARY CIRCULATION:  Mid circumflex: There was a discrete 95 % stenosis  1ST LESION INTERVENTIONS:  A drug-eluting stent procedure was performed on the 95 % lesion in the mid circumflex  Following intervention there was an excellent angiographic appearance with a 0 % residual stenosis  A Xience Torie Rx 2 75 x 18mm drug-eluting stent was placed across the lesion and deployed at a maximum inflation pressure of 12 yong  RECOMMENDATIONS:  Patient management should include statin therapy    Patient management to include dual antiplatelet therapy  INDICATIONS:  --  Coronary artery disease: significant (50% or greater) lesion in a major coronary artery  PROCEDURES PERFORMED    --  Right coronary angiography  --  Inpatient  --  Mod Sedation Same Physician Initial 15min  --  Coronary Catheterization (w/o [facility])  --  Coronary Drug Eluting Stent w/PTCA  --  Intervention on mid circumflex: drug-eluting stent  PROCEDURE: The risks and alternatives of the procedures and conscious sedation were explained to the patient and informed consent was obtained  The patient was brought to the cath lab and placed on the table  The planned puncture sites  were prepped and draped in the usual sterile fashion  --  Right radial artery access  After performing an Sandro's test to verify adequate ulnar artery supply to the hand, the radial site was prepped  The puncture site was infiltrated with local anesthetic  The vessel was accessed using the  modified Seldinger technique, a wire was advanced into the vessel, and a sheath was advanced over the wire into the vessel  --  Right coronary artery angiography  A catheter was advanced over a guidewire into the aorta and positioned in the right coronary artery ostium under fluoroscopic guidance  Angiography was performed  --  Inpatient  --  Mod Sedation Same Physician Initial 15min  --  Coronary Catheterization (w/o [facility])  LESION INTERVENTION: A drug-eluting stent procedure was performed on the 95 % lesion in the mid circumflex  Following intervention there was an excellent angiographic appearance with a 0 % residual stenosis  This was a bifurcation lesion  This was an ACC/AHA type A "low risk" lesion for intervention  There was no evidence of the transient no-reflow phenomenon  There was KEO 3 flow before the procedure and KEO 3 flow after the procedure      --  A Xience Torie Rx 2 75 x 18mm drug-eluting stent was placed across the lesion and deployed at a maximum inflation pressure of 12 yong     INTERVENTIONS:  --  Coronary Drug Eluting Stent w/PTCA  PROCEDURE COMPLETION: The patient tolerated the procedure well and was discharged from the cath lab  TIMING: Test started at 08:52  Test concluded at 09:10  HEMOSTASIS: The sheath was removed  The site was compressed with a Hemoband  device  Hemostasis was obtained  MEDICATIONS GIVEN: 1% Lidocaine, 1 ml, subcutaneously, at 08:54  Baby Aspirin, 81 mg, PO, at 08:54  Effient, 10 mg, PO, at 08:54  Verapamil (5mg/2ml), 2 5 mg, IV, at 08:57  Heparin 1000 units/ml, 7,000  units, IV, at 08:57  Nitroglycerin (200mcg/ml), 200 mcg, at 08:58  Fentanyl (1OOmcg/2 ml), 50 mcg, IV, at 09:10  Versed (2mg/2ml), 2 mg, IV, at 09:10  0 9 NSS, infusion rate of 75 ml/hr, IV, at 09:10     CORONARY VESSELS:   --  Circumflex: The vessel was large sized  Angiography showed mild atherosclerosis  --  Mid circumflex: The vessel was large sized  There was a discrete 95 % stenosis  RECOMMENDATIONS  Patient management should include statin therapy  Patient management to include dual antiplatelet therapy  DISPOSITION:  The patient left the catheterization laboratory in stable condition  Prepared and signed by    Last Cristina MD  Signed 11/24/2020 09:20:57    Study diagram    Angiographic findings  Native coronary lesions:  ï¾·Mid circumflex: Lesion 1: discrete, 95 % stenosis  Intervention results  Native coronary lesions:  ï¾·drug-eluting stent of the 95 % stenosis in mid circumflex  Appearance excellent with 0 % residual stenosis  Stent: Cornelia Mackenzie Rx 2 75 x 18mm drug-eluting      Hemodynamic tables    Pressures:  Baseline  Pressures:  - HR: 74  Pressures:  - Rhythm:  Pressures:  -- Aortic Pressure (S/D/M): 122/76/41    Outputs:  Baseline  Outputs:  -- CALCULATIONS[age]  Outputs:  -- CALCULATIONS: Body Surface Area: 2 09  Outputs:  -- CALCULATIONS: Height in cm: 175 00  Outputs:  -- CALCULATIONS: Sex: Male  Outputs:  -- CALCULATIONS: Weight in kg: 93  80         Imaging:  Chest X-Ray:   No Chest XR results available for this patient  CT-scan of the chest:     Cta Dissection Protocol Chest Abdomen Pelvis W Wo Contrast    Result Date: 11/22/2020  Impression 1  No evidence of aortic dissection, intramural hematoma or aneurysm  2   Hepatic fatty infiltration  3   Mild sigmoid diverticulosis without acute diverticulitis  Workstation performed: [id number]     Lab Review   Lab Results   Component Value Date    WBC 9 23 11/25/2020    HGB 14 1 11/25/2020    HCT 41 3 11/25/2020    MCV 90 11/25/2020    RDW 12 7 11/25/2020     11/25/2020     BMP:  Lab Results   Component Value Date    SODIUM 141 11/25/2020    K 3 8 11/25/2020     11/25/2020    CO2 24 11/25/2020    BUN 11 11/25/2020    CREATININE 0 90 11/25/2020    GLUC 118 11/25/2020    GLUF 119 (H) 11/23/2020    CALCIUM 8 5 11/25/2020    CORRECTEDCA 9 0 11/24/2020    EGFR 94 11/25/2020    MG 2 1 11/24/2020     LFT:  Lab Results   Component Value Date    AST 33 11/24/2020    ALT 42 11/24/2020    ALKPHOS 51 11/24/2020    TP 6 5 11/24/2020    ALB 3 2 (L) 11/24/2020      No components found for: [facility]  Lab Results   Component Value Date    [id number] 3 598 11/23/2020     Lab Results   Component Value Date    HGBA1C 5 7 (H) 11/22/2020     Lipid Profile:   Lab Results   Component Value Date    CHOLESTEROL 243 (H) 11/24/2020    HDL 40 11/24/2020    LDLCALC 158 (H) 11/24/2020    TRIG 227 (H) 11/24/2020     Lab Results   Component Value Date    CHOLESTEROL 243 (H) 11/24/2020    CHOLESTEROL 252 (H) 11/23/2020     Lab Results   Component Value Date    TROPONINI 0 07 (H) 11/23/2020     Lab Results   Component Value Date    NTBNP 231 (H) 11/22/2020          Dr Yvette Abel MD [facility]      "This note has been constructed using a voice recognition system  Therefore there may be syntax, spelling, and/or grammatical errors   Please call if you have any questions  "

## 2021-06-15 ENCOUNTER — TELEPHONE (OUTPATIENT)
Dept: CARDIOLOGY CLINIC | Facility: CLINIC | Age: 61
End: 2021-06-15

## 2021-06-15 ENCOUNTER — APPOINTMENT (OUTPATIENT)
Dept: LAB | Facility: CLINIC | Age: 61
End: 2021-06-15
Payer: COMMERCIAL

## 2021-06-15 DIAGNOSIS — Z98.61 HISTORY OF PERCUTANEOUS CORONARY INTERVENTION: ICD-10-CM

## 2021-06-15 DIAGNOSIS — E78.2 MIXED HYPERLIPIDEMIA: ICD-10-CM

## 2021-06-15 DIAGNOSIS — I21.4 NSTEMI (NON-ST ELEVATED MYOCARDIAL INFARCTION) (HCC): ICD-10-CM

## 2021-06-15 DIAGNOSIS — I10 ESSENTIAL HYPERTENSION: ICD-10-CM

## 2021-06-15 DIAGNOSIS — E66.9 OBESITY (BMI 30-39.9): ICD-10-CM

## 2021-06-15 DIAGNOSIS — I25.10 CORONARY ARTERY DISEASE INVOLVING NATIVE CORONARY ARTERY OF NATIVE HEART WITHOUT ANGINA PECTORIS: ICD-10-CM

## 2021-06-15 LAB
ALBUMIN SERPL BCP-MCNC: 3.8 G/DL (ref 3.5–5)
ALP SERPL-CCNC: 82 U/L (ref 46–116)
ALT SERPL W P-5'-P-CCNC: 33 U/L (ref 12–78)
ANION GAP SERPL CALCULATED.3IONS-SCNC: 5 MMOL/L (ref 4–13)
AST SERPL W P-5'-P-CCNC: 17 U/L (ref 5–45)
BASOPHILS # BLD AUTO: 0.1 THOUSANDS/ΜL (ref 0–0.1)
BASOPHILS NFR BLD AUTO: 1 % (ref 0–1)
BILIRUB SERPL-MCNC: 0.92 MG/DL (ref 0.2–1)
BUN SERPL-MCNC: 14 MG/DL (ref 5–25)
CALCIUM SERPL-MCNC: 9.1 MG/DL (ref 8.3–10.1)
CHLORIDE SERPL-SCNC: 110 MMOL/L (ref 100–108)
CHOLEST SERPL-MCNC: 131 MG/DL (ref 50–200)
CO2 SERPL-SCNC: 27 MMOL/L (ref 21–32)
CREAT SERPL-MCNC: 0.87 MG/DL (ref 0.6–1.3)
EOSINOPHIL # BLD AUTO: 0.25 THOUSAND/ΜL (ref 0–0.61)
EOSINOPHIL NFR BLD AUTO: 3 % (ref 0–6)
ERYTHROCYTE [DISTWIDTH] IN BLOOD BY AUTOMATED COUNT: 12.6 % (ref 11.6–15.1)
GFR SERPL CREATININE-BSD FRML MDRD: 95 ML/MIN/1.73SQ M
GLUCOSE P FAST SERPL-MCNC: 119 MG/DL (ref 65–99)
HCT VFR BLD AUTO: 43.7 % (ref 36.5–49.3)
HDLC SERPL-MCNC: 43 MG/DL
HGB BLD-MCNC: 15 G/DL (ref 12–17)
IMM GRANULOCYTES # BLD AUTO: 0.02 THOUSAND/UL (ref 0–0.2)
IMM GRANULOCYTES NFR BLD AUTO: 0 % (ref 0–2)
LDLC SERPL CALC-MCNC: 58 MG/DL (ref 0–100)
LYMPHOCYTES # BLD AUTO: 1.72 THOUSANDS/ΜL (ref 0.6–4.47)
LYMPHOCYTES NFR BLD AUTO: 23 % (ref 14–44)
MCH RBC QN AUTO: 30.7 PG (ref 26.8–34.3)
MCHC RBC AUTO-ENTMCNC: 34.3 G/DL (ref 31.4–37.4)
MCV RBC AUTO: 89 FL (ref 82–98)
MONOCYTES # BLD AUTO: 0.76 THOUSAND/ΜL (ref 0.17–1.22)
MONOCYTES NFR BLD AUTO: 10 % (ref 4–12)
NEUTROPHILS # BLD AUTO: 4.73 THOUSANDS/ΜL (ref 1.85–7.62)
NEUTS SEG NFR BLD AUTO: 63 % (ref 43–75)
NONHDLC SERPL-MCNC: 88 MG/DL
NRBC BLD AUTO-RTO: 0 /100 WBCS
PLATELET # BLD AUTO: 278 THOUSANDS/UL (ref 149–390)
PMV BLD AUTO: 10 FL (ref 8.9–12.7)
POTASSIUM SERPL-SCNC: 4.3 MMOL/L (ref 3.5–5.3)
PROT SERPL-MCNC: 7 G/DL (ref 6.4–8.2)
RBC # BLD AUTO: 4.89 MILLION/UL (ref 3.88–5.62)
SODIUM SERPL-SCNC: 142 MMOL/L (ref 136–145)
TRIGL SERPL-MCNC: 148 MG/DL
WBC # BLD AUTO: 7.58 THOUSAND/UL (ref 4.31–10.16)

## 2021-06-15 PROCEDURE — 85025 COMPLETE CBC W/AUTO DIFF WBC: CPT

## 2021-06-15 PROCEDURE — 80061 LIPID PANEL: CPT

## 2021-06-15 PROCEDURE — 80053 COMPREHEN METABOLIC PANEL: CPT

## 2021-06-15 PROCEDURE — 36415 COLL VENOUS BLD VENIPUNCTURE: CPT

## 2021-06-15 NOTE — TELEPHONE ENCOUNTER
----- Message from Melvi Perales MD sent at 6/15/2021 11:39 AM EDT -----  Patient blood test reviewed  They are acceptable  Will continue same medication  Patient should keep his appointment

## 2021-07-27 DIAGNOSIS — Z95.5 S/P CORONARY ARTERY STENT PLACEMENT: ICD-10-CM

## 2021-07-27 RX ORDER — NITROGLYCERIN 0.4 MG/1
0.4 TABLET SUBLINGUAL
Qty: 60 TABLET | Refills: 0 | Status: SHIPPED | OUTPATIENT
Start: 2021-07-27

## 2021-07-27 NOTE — TELEPHONE ENCOUNTER
----- Message from Tamikoyandel Rivers sent at 7/26/2021  4:04 PM EDT -----  Regarding: Prescription Question  Contact: [phone]  Dr Kristel Thomason,    Could I get a refill of the following medication?    nitroglycerin 0 4 mg SL tablet  Commonly known as: NITROSTAT    I kept them in my pocket but have lost track of them so no longer have any  I would like to have just in case  Thank you

## 2021-12-12 DIAGNOSIS — Z95.5 S/P CORONARY ARTERY STENT PLACEMENT: ICD-10-CM

## 2021-12-12 DIAGNOSIS — I21.4 NSTEMI (NON-ST ELEVATION MYOCARDIAL INFARCTION) (HCC): ICD-10-CM

## 2021-12-12 DIAGNOSIS — I21.4 NSTEMI (NON-ST ELEVATED MYOCARDIAL INFARCTION) (HCC): ICD-10-CM

## 2021-12-12 RX ORDER — PRASUGREL 10 MG/1
TABLET, FILM COATED ORAL
Qty: 90 TABLET | Refills: 3 | Status: SHIPPED | OUTPATIENT
Start: 2021-12-12 | End: 2022-06-14

## 2021-12-12 RX ORDER — ATORVASTATIN CALCIUM 80 MG/1
TABLET, FILM COATED ORAL
Qty: 90 TABLET | Refills: 3 | Status: SHIPPED | OUTPATIENT
Start: 2021-12-12

## 2021-12-14 ENCOUNTER — OFFICE VISIT (OUTPATIENT)
Dept: CARDIOLOGY CLINIC | Facility: CLINIC | Age: 61
End: 2021-12-14
Payer: COMMERCIAL

## 2021-12-14 VITALS
SYSTOLIC BLOOD PRESSURE: 122 MMHG | WEIGHT: 203 LBS | HEIGHT: 69 IN | TEMPERATURE: 97.5 F | DIASTOLIC BLOOD PRESSURE: 80 MMHG | HEART RATE: 61 BPM | OXYGEN SATURATION: 97 % | BODY MASS INDEX: 30.07 KG/M2

## 2021-12-14 DIAGNOSIS — Z12.11 ENCOUNTER FOR SCREENING COLONOSCOPY: ICD-10-CM

## 2021-12-14 DIAGNOSIS — I25.10 CORONARY ARTERY DISEASE INVOLVING NATIVE CORONARY ARTERY OF NATIVE HEART WITHOUT ANGINA PECTORIS: ICD-10-CM

## 2021-12-14 DIAGNOSIS — I10 ESSENTIAL HYPERTENSION: ICD-10-CM

## 2021-12-14 DIAGNOSIS — Z98.61 HISTORY OF PERCUTANEOUS CORONARY INTERVENTION: ICD-10-CM

## 2021-12-14 DIAGNOSIS — E78.2 MIXED HYPERLIPIDEMIA: ICD-10-CM

## 2021-12-14 DIAGNOSIS — E66.9 OBESITY (BMI 30-39.9): ICD-10-CM

## 2021-12-14 PROCEDURE — 93000 ELECTROCARDIOGRAM COMPLETE: CPT | Performed by: INTERNAL MEDICINE

## 2021-12-14 PROCEDURE — 99214 OFFICE O/P EST MOD 30 MIN: CPT | Performed by: INTERNAL MEDICINE

## 2022-03-25 ENCOUNTER — TELEPHONE (OUTPATIENT)
Dept: GASTROENTEROLOGY | Facility: CLINIC | Age: 62
End: 2022-03-25

## 2022-03-25 NOTE — TELEPHONE ENCOUNTER
Left message for patient to return call to reschedule (3/29) with Dr Estrella Noe   Please schedule with a PA

## 2022-03-29 ENCOUNTER — TELEPHONE (OUTPATIENT)
Dept: GASTROENTEROLOGY | Facility: CLINIC | Age: 62
End: 2022-03-29

## 2022-03-29 ENCOUNTER — OFFICE VISIT (OUTPATIENT)
Dept: GASTROENTEROLOGY | Facility: CLINIC | Age: 62
End: 2022-03-29

## 2022-03-29 VITALS
HEIGHT: 69 IN | DIASTOLIC BLOOD PRESSURE: 83 MMHG | WEIGHT: 216.4 LBS | BODY MASS INDEX: 32.05 KG/M2 | TEMPERATURE: 97 F | HEART RATE: 65 BPM | SYSTOLIC BLOOD PRESSURE: 127 MMHG

## 2022-03-29 DIAGNOSIS — Z12.11 ENCOUNTER FOR SCREENING COLONOSCOPY: ICD-10-CM

## 2022-03-29 PROCEDURE — 99213 OFFICE O/P EST LOW 20 MIN: CPT | Performed by: PHYSICIAN ASSISTANT

## 2022-03-29 RX ORDER — SODIUM PICOSULFATE, MAGNESIUM OXIDE, AND ANHYDROUS CITRIC ACID 10; 3.5; 12 MG/160ML; G/160ML; G/160ML
1 LIQUID ORAL ONCE
Qty: 320 ML | Refills: 0 | Status: SHIPPED | OUTPATIENT
Start: 2022-03-29 | End: 2022-03-29

## 2022-03-29 NOTE — TELEPHONE ENCOUNTER
Our mutual patient is scheduled for procedure: colonoscopy    On: June 7 , 2022     With: Dr Dione Woodard MD    He/She is taking the following blood thinner: Effient (Prasugrel)    Can this be stopped  5 days prior to the procedure    Physician Approving clearance:

## 2022-03-29 NOTE — PROGRESS NOTES
[facility] - Outpatient Follow-up Note  Santosh Ellis 63 y o  male MRN: [id number]  Encounter: [id number]          ASSESSMENT AND PLAN:      1  Colon cancer screening  Patient with history of NSTEMI s/p stent placed over 1 year ago on Effient presents for colon cancer screening  No prior colonoscopy  No GI symptoms at this time  No recent hemoglobin  No family history of colon cancer     -schedule colonoscopy     -discussed risks of procedure including bleeding, infection perforation     -clenpiq ordered  Can use miralax/mag if not covered      -Patient saw cardiology 12/2021 who stated in their note that patient can stop medication prior to procedure as planned  We will send message for repeat clearance regarding holding this to ensure  Patient will follow-up after procedure as needed  ______________________________________________________________________    SUBJECTIVE:      Santosh Ellis is a 61-year-old male with past medical history of coronary artery disease status post NSTEMI required stent on Effient, dyslipidemia, hypertension who presents for colon cancer screening  Patient previously saw loss last year after ingesting started on Effient  He was advised that he could either pursue colonoscopy diagnostically while on Effient  He preferred to wait until follow-up with Cardiology  Patient saw cardiology 12/2021 and in their note the provider states that he can stop his anti-platelet therapy of Effient prior to GI procedures  Patient reports he is doing well  He has no constipation, diarrhea, blood in stool, abdominal pain, decreased appetite or unintentional weight loss  He also denies any nausea, vomiting, reflux, dysphagia, odynophagia  Abdominal surgeries consistent for umbilical and inguinal hernia  Denies smoking  Reports glass of wine a night      Lab work last year with normal liver function tests, normal kidney function, normal hemoglobin and platelets  No prior EGD or colonoscopy  REVIEW OF SYSTEMS IS OTHERWISE NEGATIVE  Historical Information   Past Medical History:   Diagnosis Date    Cancer ([address] )     Dry eyes     Spinal stenosis      Past Surgical History:   Procedure Laterality Date    CORONARY ANGIOPLASTY WITH STENT PLACEMENT      HERNIA REPAIR      ROTATOR CUFF REPAIR       Social History   Social History     Substance and Sexual Activity   Alcohol Use Yes    Comment: socially      Social History     Substance and Sexual Activity   Drug Use Never     Social History     Tobacco Use   Smoking Status Never Smoker   Smokeless Tobacco Never Used     No family history on file  Meds/Allergies       Current Outpatient Medications:     aspirin 81 mg chewable tablet    atorvastatin (LIPITOR) 80 mg tablet    metoprolol tartrate (LOPRESSOR) 25 mg tablet    nitroglycerin (NITROSTAT) 0 4 mg SL tablet    prasugrel (EFFIENT) tablet    Xiidra 5 % op solution    No Known Allergies        Objective     There were no vitals taken for this visit  There is no height or weight on file to calculate BMI  PHYSICAL EXAM:      General Appearance:   Alert, cooperative, no distress   HEENT:   Normocephalic, atraumatic, anicteric      Neck:  Supple, symmetrical, trachea midline   Lungs:   Clear to auscultation bilaterally; no rales, rhonchi or wheezing; respirations unlabored    Heart[de-identified]   Regular rate and rhythm; no murmur, rub, or gallop  Abdomen:   Soft, non-tender, non-distended; normal bowel sounds; no masses, no organomegaly    Genitalia:   Deferred    Rectal:   Deferred    Extremities:  No cyanosis, clubbing or edema    Pulses:  2+ and symmetric    Skin:  No jaundice, rashes, or lesions    Lymph nodes:  No palpable cervical lymphadenopathy        Lab Results:   No visits with results within 1 Day(s) from this visit     Latest known visit with results is:   Appointment on 06/15/2021   Component Date Value    Sodium 06/15/2021 142     Potassium 06/15/2021 4 3     Chloride 06/15/2021 110*    CO2 06/15/2021 27     ANION GAP 06/15/2021 5     BUN 06/15/2021 14     Creatinine 06/15/2021 0 87     Glucose, Fasting 06/15/2021 119*    Calcium 06/15/2021 9 1     AST 06/15/2021 17     ALT 06/15/2021 33     Alkaline Phosphatase 06/15/2021 82     Total Protein 06/15/2021 7 0     Albumin 06/15/2021 3 8     Total Bilirubin 06/15/2021 0 92     eGFR 06/15/2021 95     WBC 06/15/2021 7 58     RBC 06/15/2021 4 89     Hemoglobin 06/15/2021 15 0     Hematocrit 06/15/2021 43 7     MCV 06/15/2021 89     MCH 06/15/2021 30 7     MCHC 06/15/2021 34 3     RDW 06/15/2021 12 6     MPV 06/15/2021 10 0     Platelets 73/54/5544 278     nRBC 06/15/2021 0     Neutrophils Relative 06/15/2021 63     Immat GRANS % 06/15/2021 0     Lymphocytes Relative 06/15/2021 23     Monocytes Relative 06/15/2021 10     Eosinophils Relative 06/15/2021 3     Basophils Relative 06/15/2021 1     Neutrophils Absolute 06/15/2021 4 73     Immature Grans Absolute 06/15/2021 0 02     Lymphocytes Absolute 06/15/2021 1 72     Monocytes Absolute 06/15/2021 0 76     Eosinophils Absolute 06/15/2021 0 25     Basophils Absolute 06/15/2021 0 10     Cholesterol 06/15/2021 131     Triglycerides 06/15/2021 148     HDL, Direct 06/15/2021 43     LDL Calculated 06/15/2021 58     Non-HDL-Chol (CHOL-HDL) 06/15/2021 88          Radiology Results:   No results found

## 2022-03-29 NOTE — PATIENT INSTRUCTIONS
Scheduled date of colonoscopy (as of today): 06/07/22  Physician performing colonoscopy: Deshawn Reyes  Location of colonoscopy: [facility]  Bowel prep reviewed with patient: [facility]  Instructions reviewed with patient by: VIR[place]  Clearances: EFFIENT

## 2022-05-19 NOTE — TELEPHONE ENCOUNTER
Dr Jerod Muhammad may pt hold Effient 5 days prior to colonoscopy scheduled for 6/7/22 with Dr Shahnaz Miramontes?

## 2022-05-20 ENCOUNTER — TELEPHONE (OUTPATIENT)
Dept: GASTROENTEROLOGY | Facility: AMBULARY SURGERY CENTER | Age: 62
End: 2022-05-20

## 2022-05-20 NOTE — TELEPHONE ENCOUNTER
Spoke w/ pt  Pt is aware OK to hold EFFIENT per dr José Miguel Zaidi   Pt is scheduled for GI procedures 6/7/2022

## 2022-06-07 ENCOUNTER — ANESTHESIA EVENT (OUTPATIENT)
Dept: GASTROENTEROLOGY | Facility: AMBULARY SURGERY CENTER | Age: 62
End: 2022-06-07

## 2022-06-07 ENCOUNTER — ANESTHESIA (OUTPATIENT)
Dept: GASTROENTEROLOGY | Facility: AMBULARY SURGERY CENTER | Age: 62
End: 2022-06-07

## 2022-06-07 ENCOUNTER — HOSPITAL ENCOUNTER (OUTPATIENT)
Dept: GASTROENTEROLOGY | Facility: AMBULARY SURGERY CENTER | Age: 62
Setting detail: OUTPATIENT SURGERY
Discharge: HOME | End: 2022-06-07
Payer: COMMERCIAL

## 2022-06-07 VITALS
TEMPERATURE: 97 F | HEART RATE: 65 BPM | HEIGHT: 69 IN | BODY MASS INDEX: 30.96 KG/M2 | SYSTOLIC BLOOD PRESSURE: 127 MMHG | DIASTOLIC BLOOD PRESSURE: 78 MMHG | OXYGEN SATURATION: 97 % | RESPIRATION RATE: 15 BRPM | WEIGHT: 209 LBS

## 2022-06-07 DIAGNOSIS — Z12.11 ENCOUNTER FOR SCREENING COLONOSCOPY: ICD-10-CM

## 2022-06-07 PROCEDURE — 88305 TISSUE EXAM BY PATHOLOGIST: CPT | Performed by: PATHOLOGY

## 2022-06-07 PROCEDURE — 45385 COLONOSCOPY W/LESION REMOVAL: CPT | Performed by: INTERNAL MEDICINE

## 2022-06-07 RX ORDER — PROPOFOL 10 MG/ML
INJECTION, EMULSION INTRAVENOUS AS NEEDED
Status: DISCONTINUED | OUTPATIENT
Start: 2022-06-07 | End: 2022-06-07

## 2022-06-07 RX ORDER — LIDOCAINE HYDROCHLORIDE 10 MG/ML
INJECTION, SOLUTION EPIDURAL; INFILTRATION; INTRACAUDAL; PERINEURAL AS NEEDED
Status: DISCONTINUED | OUTPATIENT
Start: 2022-06-07 | End: 2022-06-07

## 2022-06-07 RX ORDER — SODIUM CHLORIDE, SODIUM LACTATE, POTASSIUM CHLORIDE, CALCIUM CHLORIDE 600; 310; 30; 20 MG/100ML; MG/100ML; MG/100ML; MG/100ML
INJECTION, SOLUTION INTRAVENOUS CONTINUOUS PRN
Status: DISCONTINUED | OUTPATIENT
Start: 2022-06-07 | End: 2022-06-07

## 2022-06-07 RX ADMIN — PROPOFOL 100 MG: 10 INJECTION, EMULSION INTRAVENOUS at 12:16

## 2022-06-07 RX ADMIN — LIDOCAINE HYDROCHLORIDE 50 MG: 10 INJECTION, SOLUTION EPIDURAL; INFILTRATION; INTRACAUDAL; PERINEURAL at 12:14

## 2022-06-07 RX ADMIN — PROPOFOL 50 MG: 10 INJECTION, EMULSION INTRAVENOUS at 12:21

## 2022-06-07 RX ADMIN — SODIUM CHLORIDE, SODIUM LACTATE, POTASSIUM CHLORIDE, AND CALCIUM CHLORIDE: .6; .31; .03; .02 INJECTION, SOLUTION INTRAVENOUS at 12:10

## 2022-06-07 RX ADMIN — PROPOFOL 50 MG: 10 INJECTION, EMULSION INTRAVENOUS at 12:17

## 2022-06-07 RX ADMIN — PROPOFOL 50 MG: 10 INJECTION, EMULSION INTRAVENOUS at 12:18

## 2022-06-07 NOTE — H&P
History and Physical -  [facility]  Masoud King 63 y o  male MRN: [id number]        HPI:  79-year-old male with history of coronary artery disease status post stent placement was referred for screening colonoscopy  Regular bowel movements  Historical Information   Past Medical History:   Diagnosis Date    Cancer ([address] )     Dry eyes     Spinal stenosis      Past Surgical History:   Procedure Laterality Date    CORONARY ANGIOPLASTY WITH STENT PLACEMENT      HERNIA REPAIR      ROTATOR CUFF REPAIR       Social History   Social History     Substance and Sexual Activity   Alcohol Use Yes    Comment: socially      Social History     Substance and Sexual Activity   Drug Use Never     Social History     Tobacco Use   Smoking Status Never Smoker   Smokeless Tobacco Never Used     History reviewed  No pertinent family history  Meds/Allergies     (Not in a hospital admission)      No Known Allergies    Objective     Pulse 67, temperature (!) 96 7 °F (35 9 °C), temperature source Temporal, resp  rate 19, height 5' 9" (1 753 m), weight 94 8 kg (209 lb), SpO2 96 %      PHYSICAL EXAM:    Gen: NAD  CV: S1 & S2 normal, RRR  CHEST: Clear to auscultate  ABD: soft, NT/ND, good bowel sounds  EXT: no edema    ASSESSMENT:     Screening for colon cancer    PLAN:    Colonoscopy

## 2022-06-07 NOTE — ANESTHESIA PREPROCEDURE EVALUATION
Procedure:  COLONOSCOPY  Past Medical History:   Diagnosis Date    Cancer ([address] )     Dry eyes     Spinal stenosis      #CAD c/b NSTEMI s/p PCI with DEBORA placement, on DAPT   TTE (11/2020):  LEFT VENTRICLE: Size was normal  Systolic function was normal  Ejection fraction was estimated in the range of 60 % to 65 %  There were no regional wall motion abnormalities  Wall thickness was normal  No evidence of apical thrombus  DOPPLER: Left ventricular diastolic function parameters were normal      RIGHT VENTRICLE: The size was normal  Systolic function was normal with TAPSE-1 9cm Wall thickness was normal      LEFT ATRIUM: Size was normal      RIGHT ATRIUM: Size was normal      MITRAL VALVE: There was mild thickening  There was normal leaflet separation  DOPPLER: The transmitral velocity was within the normal range  There was no evidence for stenosis  There was trace regurgitation      AORTIC VALVE: The valve was trileaflet  Leaflets exhibited normal thickness and normal cuspal separation  DOPPLER: Transaortic velocity was within the normal range  There was no evidence for stenosis  There was no significant  regurgitation      TRICUSPID VALVE: The valve structure was normal  There was normal leaflet separation  DOPPLER: The transtricuspid velocity was within the normal range  There was no evidence for stenosis  There was no significant regurgitation      PULMONIC VALVE: Leaflets exhibited normal thickness, no calcification, and normal cuspal separation  DOPPLER: The transpulmonic velocity was within the normal range  There was no significant regurgitation      PERICARDIUM: There was no pericardial effusion  The pericardium was normal in appearance      AORTA: The root exhibited normal size      SYSTEMIC VEINS: IVC: The inferior vena cava was normal in size        Physical Exam    Airway    Mallampati score: II  TM Distance: >3 FB  Neck ROM: full     Dental   No notable dental hx     Cardiovascular  Rhythm: regular, Rate: normal,     Pulmonary  Breath sounds clear to auscultation,     Other Findings  Intercisor Distance > 3cm          Anesthesia Plan  ASA Score- 3     Anesthesia Type- IV sedation with anesthesia with ASA Monitors  Additional Monitors:   Airway Plan:     Comment: NPO appropriate  Discussed benefits/risks of monitored anesthetic care and discussed providing a dynamic level of mild to deep sedation  Complications include awareness, airway obstruction, aspiration which may necessitate conversion to general anesthesia  All questions answered  Patient understands and wishes to proceed          Plan Factors-Exercise tolerance (METS): >4 METS  Chart reviewed  EKG reviewed  Existing labs reviewed  Induction-     Postoperative Plan- Plan for postoperative opioid use  Informed Consent- Anesthetic plan and risks discussed with patient  I personally reviewed this patient with the CRNA  Discussed and agreed on the Anesthesia Plan with the CRNA  Messi Daniel

## 2022-06-14 ENCOUNTER — OFFICE VISIT (OUTPATIENT)
Dept: CARDIOLOGY CLINIC | Facility: CLINIC | Age: 62
End: 2022-06-14
Payer: COMMERCIAL

## 2022-06-14 VITALS
SYSTOLIC BLOOD PRESSURE: 120 MMHG | BODY MASS INDEX: 31.4 KG/M2 | HEIGHT: 69 IN | DIASTOLIC BLOOD PRESSURE: 80 MMHG | HEART RATE: 67 BPM | TEMPERATURE: 97 F | WEIGHT: 212 LBS | OXYGEN SATURATION: 97 %

## 2022-06-14 DIAGNOSIS — I25.10 CORONARY ARTERY DISEASE INVOLVING NATIVE CORONARY ARTERY OF NATIVE HEART WITHOUT ANGINA PECTORIS: ICD-10-CM

## 2022-06-14 DIAGNOSIS — I10 ESSENTIAL HYPERTENSION: ICD-10-CM

## 2022-06-14 DIAGNOSIS — E66.9 OBESITY (BMI 30-39.9): ICD-10-CM

## 2022-06-14 DIAGNOSIS — Z98.61 HISTORY OF PERCUTANEOUS CORONARY INTERVENTION: ICD-10-CM

## 2022-06-14 DIAGNOSIS — Z95.5 S/P CORONARY ARTERY STENT PLACEMENT: ICD-10-CM

## 2022-06-14 DIAGNOSIS — E78.2 MIXED HYPERLIPIDEMIA: ICD-10-CM

## 2022-06-14 PROCEDURE — 93000 ELECTROCARDIOGRAM COMPLETE: CPT | Performed by: INTERNAL MEDICINE

## 2022-06-14 PROCEDURE — 99214 OFFICE O/P EST MOD 30 MIN: CPT | Performed by: INTERNAL MEDICINE

## 2022-06-14 NOTE — PROGRESS NOTES
Progress Note - Cardiology Office  [facility]    Soni Murray 63 y o  male MRN: [id number]  : 1960  Encounter: [id number]      Assessment:     1  Coronary artery disease involving native coronary artery of native heart without angina pectoris    2  Essential hypertension    3  Mixed hyperlipidemia    4  Obesity (BMI 30-39 9)    5  S/P coronary artery stent placement    6  History of percutaneous coronary intervention        Discussion Summary and Plan:  1  Coronary artery disease status post non ST elevation MI status post stenting of LCX with drug coated stent  All issues related to drug coated stent discusse discussed with patient  He has completed dual platelet therapy for more than 18 months  Will discontinue Effient continue aspirin 162 mg daily for life  2  Dyslipidemia  Patient on high intensity statin cholesterol profile last blood test in 2021 has improved  Will check fasting lipids and LFTs  3  Essential hypertension  Patient blood pressure is pretty well controlled with current therapy  Electrolytes will be checked  4  Obesity with BMI around 31  He has gained some weight since last visit  That could cause some with exertion shortness of breath advised him to lose weight     5  Colon cancer screening  Patient can have his colon cancer screening his angioplasty was more than a year old now  Follow-up 6 months      All questions related to coronary artery disease, need for blood thinner, statins, monitoring liver enzyme all discussed with patient all their questions answered to satisfaction  Follow-up 6 months  Please call [phone] if any questions  Counseling :  A description of the counseling  Goals and Barriers  Patient's ability to self care: Yes  Medication side effect reviewed with patient in detail and all their questions answered to their satisfaction      HPI :     Soni Murray is a 63y o  year old male who came for follow up  Patient hasMedical history significant for hypertension dyslipidemia was recently admitted to [facility] with chest pain and underwent cardiac catheterization found to have significant stenosis of mid circumflex  Which was successfully stented with drug-eluting stent  He came for follow-up he is doing well denies any chest pain any shortness of breath no nausea no vomiting no fever no chills no PND no orthopnea no other issues at this time  06/14/2022  Above reviewed  Patient came for follow-up  He is doing well he had history of coronary artery disease status post non ST elevation MI status post stenting of mid circumflex with drug-eluting stent  He has been doing well since then  His vitals has been stable  His last blood test was in May 2021 which has been acceptable  His angioplasty was done in November 2020  Used to exercise lately he stopped he try to do it felt some exertional shortness of breath but no chest pain no other issues at this time  Review of Systems   Constitutional: Negative for activity change, chills, diaphoresis, fever and unexpected weight change  Has gained some weight since last visit   HENT: Negative for congestion  Eyes: Negative for discharge and redness  Respiratory: Positive for shortness of breath  Negative for cough, chest tightness and wheezing  Mild exertional   Cardiovascular: Negative  Negative for chest pain, palpitations and leg swelling  Gastrointestinal: Negative for abdominal pain, diarrhea and nausea  Endocrine: Negative  Genitourinary: Negative for decreased urine volume and urgency  Musculoskeletal: Negative  Negative for arthralgias, back pain and gait problem  Skin: Negative for rash and wound  Allergic/Immunologic: Negative  Neurological: Negative for dizziness, seizures, syncope, weakness, light-headedness and headaches  Hematological: Negative      Psychiatric/Behavioral: Negative for agitation and confusion  The patient is not nervous/anxious  Historical Information   Past Medical History:   Diagnosis Date    Cancer ([address] )     Dry eyes     Spinal stenosis      Past Surgical History:   Procedure Laterality Date    CORONARY ANGIOPLASTY WITH STENT PLACEMENT      HERNIA REPAIR      ROTATOR CUFF REPAIR       Social History     Substance and Sexual Activity   Alcohol Use Yes    Comment: socially      Social History     Substance and Sexual Activity   Drug Use Never     Social History     Tobacco Use   Smoking Status Never Smoker   Smokeless Tobacco Never Used     Family History: No family history on file  Meds/Allergies     No Known Allergies    Current Outpatient Medications:     aspirin 81 mg chewable tablet, Chew 2 tablets (162 mg total) daily, Disp: 30 tablet, Rfl: 0    atorvastatin (LIPITOR) 80 mg tablet, TAKE 1 TABLET BY MOUTH DAILY WITH DINNER, Disp: 90 tablet, Rfl: 3    metoprolol tartrate (LOPRESSOR) 25 mg tablet, TAKE 1 TABLET (25 MG TOTAL) BY MOUTH EVERY 12 (TWELVE) HOURS, Disp: 180 tablet, Rfl: 3    Xiidra 5 % op solution, PLACE 1 DROP INTO EACH EYE EVERY 12 HOURS, Disp: , Rfl:     nitroglycerin (NITROSTAT) 0 4 mg SL tablet, Place 1 tablet (0 4 mg total) under the tongue every 5 (five) minutes as needed for chest pain (Patient not taking: No sig reported), Disp: 60 tablet, Rfl: 0    Vitals: Blood pressure 120/80, pulse 67, temperature (!) 97 °F (36 1 °C), height 5' 9" (1 753 m), weight 96 2 kg (212 lb), SpO2 97 %  ?  Body mass index is 31 31 kg/m²  Vitals:    06/14/22 1526   Weight: 96 2 kg (212 lb)     BP Readings from Last 3 Encounters:   06/14/22 120/80   06/07/22 127/78   03/29/22 127/83       Physical Exam  Constitutional:       General: He is not in acute distress  Appearance: He is well-developed  He is not diaphoretic  Neck:      Thyroid: No thyromegaly  Vascular: No JVD  Trachea: No tracheal deviation     Cardiovascular:      Rate and Rhythm: Normal rate and regular rhythm  Heart sounds: S1 normal and S2 normal  Heart sounds not distant  Murmur heard  Systolic (ejection) murmur is present with a grade of 2/6  No friction rub  No gallop  No S3 or S4 sounds  Pulmonary:      Effort: Pulmonary effort is normal  No respiratory distress  Breath sounds: Normal breath sounds  No wheezing or rales  Chest:      Chest wall: No tenderness  Abdominal:      General: Bowel sounds are normal  There is no distension  Palpations: Abdomen is soft  Tenderness: There is no abdominal tenderness  Musculoskeletal:         General: No deformity  Cervical back: Neck supple  Skin:     General: Skin is warm and dry  Coloration: Skin is not pale  Findings: No rash  Neurological:      Mental Status: He is alert and oriented to person, place, and time  Psychiatric:         Behavior: Behavior normal          Judgment: Judgment normal            Diagnostic Studies Review Cardio:    Cardiac catheterization reviewed  EKG:    Twelve lead EKG done 12/01/2020 shows normal sinus rhythm left axis deviation  Heart rate 84 beats per minute no significant changes  Twelve lead EKG 06/02/2021 shows normal sinus rhythm incomplete RBBB heart rate 66 beats per minute  No change from previous EKG    Lead EKG done 12/14/2021 shows normal sinus rhythm incomplete RBBB heart rate 61 beats per minute  Small Q-waves noted in inferior leads most likely pseudo infarct pattern no change from previous EKG  Twelve lead EKG done on 06/14/2022 shows normal sinus rhythm incomplete RBBB heart rate 67 beats per minute no change from previous EKG      Cardiac testing:   Results for orders placed during the hospital encounter of 11/22/20   Echo complete with contrast if indicated    [address]  [address] [address]  [phone]    Transthoracic Echocardiogram  2D, M-mode, Doppler, and Color Doppler    Study date:  2020    Patient: Soto JAUREGUI  MR number: [id number]  Account number: [de-identified]  : 1960  Age: 64 years  Gender: Male  Status: Outpatient  Location: Bedside  Height: 69 in  Weight: 209 7 lb  BP: 128/ 74 mmHg    Indications: Heart Failure    Diagnoses: I50 9 - Heart failure, unspecified    Sonographer:  CLAY Rendon  Referring Physician:  Ben Mckinney MD  Group:  [facility]  Interpreting Physician:  Tesfaye Walker MD    SUMMARY    LEFT VENTRICLE:  Systolic function was normal  Ejection fraction was estimated in the range of 60 % to 65 %  There were no regional wall motion abnormalities  MITRAL VALVE:  There was trace regurgitation  HISTORY: PRIOR HISTORY: Spinal Stenosis, HLD, Basal Cell Carcinoma    PROCEDURE: The procedure was performed at the bedside  This was a routine study  The transthoracic approach was used  The study included complete 2D imaging, M-mode, complete spectral Doppler, and color Doppler  The heart rate was 72 bpm,  at the start of the study  Image quality was adequate  LEFT VENTRICLE: Size was normal  Systolic function was normal  Ejection fraction was estimated in the range of 60 % to 65 %  There were no regional wall motion abnormalities  Wall thickness was normal  No evidence of apical thrombus  DOPPLER: Left ventricular diastolic function parameters were normal     RIGHT VENTRICLE: The size was normal  Systolic function was normal with TAPSE-1 9cm Wall thickness was normal     LEFT ATRIUM: Size was normal     RIGHT ATRIUM: Size was normal     MITRAL VALVE: There was mild thickening  There was normal leaflet separation  DOPPLER: The transmitral velocity was within the normal range  There was no evidence for stenosis  There was trace regurgitation  AORTIC VALVE: The valve was trileaflet  Leaflets exhibited normal thickness and normal cuspal separation  DOPPLER: Transaortic velocity was within the normal range  There was no evidence for stenosis  There was no significant  regurgitation  TRICUSPID VALVE: The valve structure was normal  There was normal leaflet separation  DOPPLER: The transtricuspid velocity was within the normal range  There was no evidence for stenosis  There was no significant regurgitation  PULMONIC VALVE: Leaflets exhibited normal thickness, no calcification, and normal cuspal separation  DOPPLER: The transpulmonic velocity was within the normal range  There was no significant regurgitation  PERICARDIUM: There was no pericardial effusion  The pericardium was normal in appearance  AORTA: The root exhibited normal size  SYSTEMIC VEINS: IVC: The inferior vena cava was normal in size  SYSTEM MEASUREMENT TABLES    2D  EF (Teich): 66 69 %    PW  MV E/A Ratio: 0 87    [facility] Accredited Echocardiography Laboratory    Prepared and electronically signed by    Hussein Del Valle MD  Signed [id number] 13:25:07       No results found for this or any previous visit  Results for orders placed during the hospital encounter of 20   Cardiac catheterization    Narrative [facility] [facility]  [phone]    [facility]    Invasive Cardiovascular Lab Complete Report    Patient: Cortez JAUREGUI  MR number: [id number]  Account number: [de-identified]  Study date: 2020  Gender: Male  : 1960  Height: 68 9 in  Weight: 206 4 lb  BSA: 2 09 mï¾²    Allergies: NO KNOWN ALLERGIES    Diagnostic Cardiologist:  Layne Hashimoto, MD  Interventional Cardiologist:  Layne Hashimoto, MD  Primary Physician:  Tania Cuello MD    SUMMARY    CORONARY CIRCULATION:  Mid circumflex: There was a discrete 95 % stenosis  1ST LESION INTERVENTIONS:  A drug-eluting stent procedure was performed on the 95 % lesion in the mid circumflex  Following intervention there was an excellent angiographic appearance with a 0 % residual stenosis    A Xience Torie Rx 2 75 x 18mm drug-eluting stent was placed across the lesion and deployed at a maximum inflation pressure of 12 yong  RECOMMENDATIONS:  Patient management should include statin therapy  Patient management to include dual antiplatelet therapy  INDICATIONS:  --  Coronary artery disease: significant (50% or greater) lesion in a major coronary artery  PROCEDURES PERFORMED    --  Right coronary angiography  --  Inpatient  --  Mod Sedation Same Physician Initial 15min  --  Coronary Catheterization (w/o [facility])  --  Coronary Drug Eluting Stent w/PTCA  --  Intervention on mid circumflex: drug-eluting stent  PROCEDURE: The risks and alternatives of the procedures and conscious sedation were explained to the patient and informed consent was obtained  The patient was brought to the cath lab and placed on the table  The planned puncture sites  were prepped and draped in the usual sterile fashion  --  Right radial artery access  After performing an Sandro's test to verify adequate ulnar artery supply to the hand, the radial site was prepped  The puncture site was infiltrated with local anesthetic  The vessel was accessed using the  modified Seldinger technique, a wire was advanced into the vessel, and a sheath was advanced over the wire into the vessel  --  Right coronary artery angiography  A catheter was advanced over a guidewire into the aorta and positioned in the right coronary artery ostium under fluoroscopic guidance  Angiography was performed  --  Inpatient  --  Mod Sedation Same Physician Initial 15min  --  Coronary Catheterization (w/o [facility])  LESION INTERVENTION: A drug-eluting stent procedure was performed on the 95 % lesion in the mid circumflex  Following intervention there was an excellent angiographic appearance with a 0 % residual stenosis  This was a bifurcation lesion  This was an ACC/AHA type A "low risk" lesion for intervention   There was no evidence of the transient no-reflow phenomenon  There was KEO 3 flow before the procedure and KEO 3 flow after the procedure  --  A Xience Torie Rx 2 75 x 18mm drug-eluting stent was placed across the lesion and deployed at a maximum inflation pressure of 12 yong  INTERVENTIONS:  --  Coronary Drug Eluting Stent w/PTCA  PROCEDURE COMPLETION: The patient tolerated the procedure well and was discharged from the cath lab  TIMING: Test started at 08:52  Test concluded at 09:10  HEMOSTASIS: The sheath was removed  The site was compressed with a Hemoband  device  Hemostasis was obtained  MEDICATIONS GIVEN: 1% Lidocaine, 1 ml, subcutaneously, at 08:54  Baby Aspirin, 81 mg, PO, at 08:54  Effient, 10 mg, PO, at 08:54  Verapamil (5mg/2ml), 2 5 mg, IV, at 08:57  Heparin 1000 units/ml, 7,000  units, IV, at 08:57  Nitroglycerin (200mcg/ml), 200 mcg, at 08:58  Fentanyl (1OOmcg/2 ml), 50 mcg, IV, at 09:10  Versed (2mg/2ml), 2 mg, IV, at 09:10  0 9 NSS, infusion rate of 75 ml/hr, IV, at 09:10     CORONARY VESSELS:   --  Circumflex: The vessel was large sized  Angiography showed mild atherosclerosis  --  Mid circumflex: The vessel was large sized  There was a discrete 95 % stenosis  RECOMMENDATIONS  Patient management should include statin therapy  Patient management to include dual antiplatelet therapy  DISPOSITION:  The patient left the catheterization laboratory in stable condition  Prepared and signed by    Sofiya Caicedo MD  Signed 11/24/2020 09:20:57    Study diagram    Angiographic findings  Native coronary lesions:  ï¾·Mid circumflex: Lesion 1: discrete, 95 % stenosis  Intervention results  Native coronary lesions:  ï¾·drug-eluting stent of the 95 % stenosis in mid circumflex  Appearance excellent with 0 % residual stenosis  Stent: Ankit Caul Rx 2 75 x 18mm drug-eluting      Hemodynamic tables    Pressures:  Baseline  Pressures:  - HR: 74  Pressures:  - Rhythm:  Pressures:  -- Aortic Pressure (S/D/M): 122/76/41    Outputs: Baseline  Outputs:  -- CALCULATIONS[age]  Outputs:  -- CALCULATIONS: Body Surface Area: 2 09  Outputs:  -- CALCULATIONS: Height in cm: 175 00  Outputs:  -- CALCULATIONS: Sex: Male  Outputs:  -- CALCULATIONS: Weight in k 80         Imaging:  Chest X-Ray:   No Chest XR results available for this patient  CT-scan of the chest:     Cta Dissection Protocol Chest Abdomen Pelvis W Wo Contrast    Result Date: 2020  Impression 1  No evidence of aortic dissection, intramural hematoma or aneurysm  2   Hepatic fatty infiltration  3   Mild sigmoid diverticulosis without acute diverticulitis   Workstation performed: [id number]     Lab Review   Lab Results   Component Value Date    WBC 7 58 06/15/2021    HGB 15 0 06/15/2021    HCT 43 7 06/15/2021    MCV 89 06/15/2021    RDW 12 6 06/15/2021     06/15/2021     BMP:  Lab Results   Component Value Date    SODIUM 142 06/15/2021    K 4 3 06/15/2021     (H) 06/15/2021    CO2 27 06/15/2021    BUN 14 06/15/2021    CREATININE 0 87 06/15/2021    GLUC 118 2020    GLUF 119 (H) 06/15/2021    CALCIUM 9 1 06/15/2021    CORRECTEDCA 9 0 2020    EGFR 95 06/15/2021    MG 2 1 2020     LFT:  Lab Results   Component Value Date    AST 17 06/15/2021    ALT 33 06/15/2021    ALKPHOS 82 06/15/2021    TP 7 0 06/15/2021    ALB 3 8 06/15/2021      No components found for: [employer] [facility]  Lab Results   Component Value Date    OPR7XDASSYSK 3 598 2020     Lab Results   Component Value Date    HGBA1C 5 7 (H) 2020     Lipid Profile:   Lab Results   Component Value Date    CHOLESTEROL 131 06/15/2021    HDL 43 06/15/2021    LDLCALC 58 06/15/2021    TRIG 148 06/15/2021     Lab Results   Component Value Date    CHOLESTEROL 131 06/15/2021    CHOLESTEROL 243 (H) 2020     Lab Results   Component Value Date    TROPONINI 0 07 (H) 2020     Lab Results   Component Value Date    NTBNP 231 (H) 2020          Dr Kathy Telles MD [facility]      "This note has been constructed using a voice recognition system  Therefore there may be syntax, spelling, and/or grammatical errors   Please call if you have any questions  "

## 2022-06-27 ENCOUNTER — TELEPHONE (OUTPATIENT)
Dept: GASTROENTEROLOGY | Facility: CLINIC | Age: 62
End: 2022-06-27

## 2022-06-27 NOTE — TELEPHONE ENCOUNTER
----- Message from Gage Lizama MD sent at 6/22/2022  5:14 PM EDT -----  Colon polyps removed came back as pre cancerous tubular adenoma and benign tissue    Repeat colonoscopy in 5 years

## 2022-07-28 ENCOUNTER — APPOINTMENT (OUTPATIENT)
Dept: LAB | Facility: CLINIC | Age: 62
End: 2022-07-28
Payer: COMMERCIAL

## 2022-07-28 DIAGNOSIS — E66.9 OBESITY (BMI 30-39.9): ICD-10-CM

## 2022-07-28 DIAGNOSIS — Z95.5 S/P CORONARY ARTERY STENT PLACEMENT: ICD-10-CM

## 2022-07-28 DIAGNOSIS — E78.2 MIXED HYPERLIPIDEMIA: ICD-10-CM

## 2022-07-28 DIAGNOSIS — I10 ESSENTIAL HYPERTENSION: ICD-10-CM

## 2022-07-28 DIAGNOSIS — Z98.61 HISTORY OF PERCUTANEOUS CORONARY INTERVENTION: ICD-10-CM

## 2022-07-28 DIAGNOSIS — I25.10 CORONARY ARTERY DISEASE INVOLVING NATIVE CORONARY ARTERY OF NATIVE HEART WITHOUT ANGINA PECTORIS: ICD-10-CM

## 2022-07-28 LAB
ALBUMIN SERPL BCP-MCNC: 3.7 G/DL (ref 3.5–5)
ALP SERPL-CCNC: 70 U/L (ref 46–116)
ALT SERPL W P-5'-P-CCNC: 34 U/L (ref 12–78)
ANION GAP SERPL CALCULATED.3IONS-SCNC: 1 MMOL/L (ref 4–13)
AST SERPL W P-5'-P-CCNC: 20 U/L (ref 5–45)
BASOPHILS # BLD AUTO: 0.06 THOUSANDS/ΜL (ref 0–0.1)
BASOPHILS NFR BLD AUTO: 1 % (ref 0–1)
BILIRUB SERPL-MCNC: 0.87 MG/DL (ref 0.2–1)
BUN SERPL-MCNC: 14 MG/DL (ref 5–25)
CALCIUM SERPL-MCNC: 9.2 MG/DL (ref 8.3–10.1)
CHLORIDE SERPL-SCNC: 109 MMOL/L (ref 96–108)
CHOLEST SERPL-MCNC: 145 MG/DL
CO2 SERPL-SCNC: 31 MMOL/L (ref 21–32)
CREAT SERPL-MCNC: 1.03 MG/DL (ref 0.6–1.3)
EOSINOPHIL # BLD AUTO: 0.18 THOUSAND/ΜL (ref 0–0.61)
EOSINOPHIL NFR BLD AUTO: 2 % (ref 0–6)
ERYTHROCYTE [DISTWIDTH] IN BLOOD BY AUTOMATED COUNT: 12.6 % (ref 11.6–15.1)
GFR SERPL CREATININE-BSD FRML MDRD: 79 ML/MIN/1.73SQ M
GLUCOSE P FAST SERPL-MCNC: 131 MG/DL (ref 65–99)
HCT VFR BLD AUTO: 45.3 % (ref 36.5–49.3)
HDLC SERPL-MCNC: 44 MG/DL
HGB BLD-MCNC: 15.4 G/DL (ref 12–17)
IMM GRANULOCYTES # BLD AUTO: 0.04 THOUSAND/UL (ref 0–0.2)
IMM GRANULOCYTES NFR BLD AUTO: 1 % (ref 0–2)
LDLC SERPL CALC-MCNC: 66 MG/DL (ref 0–100)
LYMPHOCYTES # BLD AUTO: 1.53 THOUSANDS/ΜL (ref 0.6–4.47)
LYMPHOCYTES NFR BLD AUTO: 19 % (ref 14–44)
MCH RBC QN AUTO: 31 PG (ref 26.8–34.3)
MCHC RBC AUTO-ENTMCNC: 34 G/DL (ref 31.4–37.4)
MCV RBC AUTO: 91 FL (ref 82–98)
MONOCYTES # BLD AUTO: 0.7 THOUSAND/ΜL (ref 0.17–1.22)
MONOCYTES NFR BLD AUTO: 9 % (ref 4–12)
NEUTROPHILS # BLD AUTO: 5.4 THOUSANDS/ΜL (ref 1.85–7.62)
NEUTS SEG NFR BLD AUTO: 68 % (ref 43–75)
NONHDLC SERPL-MCNC: 101 MG/DL
NRBC BLD AUTO-RTO: 0 /100 WBCS
PLATELET # BLD AUTO: 276 THOUSANDS/UL (ref 149–390)
PMV BLD AUTO: 9.7 FL (ref 8.9–12.7)
POTASSIUM SERPL-SCNC: 4.3 MMOL/L (ref 3.5–5.3)
PROT SERPL-MCNC: 7 G/DL (ref 6.4–8.4)
RBC # BLD AUTO: 4.97 MILLION/UL (ref 3.88–5.62)
SODIUM SERPL-SCNC: 141 MMOL/L (ref 135–147)
TRIGL SERPL-MCNC: 174 MG/DL
TSH SERPL DL<=0.05 MIU/L-ACNC: 1.67 UIU/ML (ref 0.45–4.5)
WBC # BLD AUTO: 7.91 THOUSAND/UL (ref 4.31–10.16)

## 2022-07-28 PROCEDURE — 85025 COMPLETE CBC W/AUTO DIFF WBC: CPT

## 2022-07-28 PROCEDURE — 36415 COLL VENOUS BLD VENIPUNCTURE: CPT

## 2022-07-28 PROCEDURE — 80061 LIPID PANEL: CPT

## 2022-07-28 PROCEDURE — 84443 ASSAY THYROID STIM HORMONE: CPT

## 2022-07-28 PROCEDURE — 80053 COMPREHEN METABOLIC PANEL: CPT

## 2022-07-29 ENCOUNTER — TELEPHONE (OUTPATIENT)
Dept: CARDIOLOGY CLINIC | Facility: CLINIC | Age: 62
End: 2022-07-29

## 2022-07-29 NOTE — TELEPHONE ENCOUNTER
----- Message from Aleyda Barrera MD sent at 7/28/2022  4:39 PM EDT -----  Patient blood test reviewed  They are acceptable  Will continue same medication  Patient should keep his appointment for follow-up

## 2022-10-05 DIAGNOSIS — I21.4 NSTEMI (NON-ST ELEVATION MYOCARDIAL INFARCTION) (HCC): ICD-10-CM

## 2022-10-06 RX ORDER — ATORVASTATIN CALCIUM 80 MG/1
TABLET, FILM COATED ORAL
Qty: 90 TABLET | Refills: 3 | Status: SHIPPED | OUTPATIENT
Start: 2022-10-06

## 2022-10-12 PROBLEM — Z12.11 SCREEN FOR COLON CANCER: Status: RESOLVED | Noted: 2021-04-15 | Resolved: 2022-10-12

## 2022-12-21 ENCOUNTER — OFFICE VISIT (OUTPATIENT)
Dept: CARDIOLOGY CLINIC | Facility: CLINIC | Age: 62
End: 2022-12-21

## 2022-12-21 VITALS
OXYGEN SATURATION: 99 % | TEMPERATURE: 96 F | SYSTOLIC BLOOD PRESSURE: 120 MMHG | WEIGHT: 214 LBS | HEIGHT: 69 IN | DIASTOLIC BLOOD PRESSURE: 80 MMHG | BODY MASS INDEX: 31.7 KG/M2 | HEART RATE: 80 BPM

## 2022-12-21 DIAGNOSIS — I25.10 CORONARY ARTERY DISEASE INVOLVING NATIVE CORONARY ARTERY OF NATIVE HEART WITHOUT ANGINA PECTORIS: ICD-10-CM

## 2022-12-21 DIAGNOSIS — Z98.61 HISTORY OF PERCUTANEOUS CORONARY INTERVENTION: ICD-10-CM

## 2022-12-21 DIAGNOSIS — I10 ESSENTIAL HYPERTENSION: ICD-10-CM

## 2022-12-21 DIAGNOSIS — E78.2 MIXED HYPERLIPIDEMIA: ICD-10-CM

## 2022-12-21 DIAGNOSIS — E66.9 OBESITY (BMI 30-39.9): ICD-10-CM

## 2022-12-21 NOTE — PROGRESS NOTES
Progress Note - Cardiology Office  [facility]    Queen Aletha 63 y o  male MRN: [id number]  : 1960  Encounter: [id number]      Assessment:     1  Coronary artery disease involving native coronary artery of native heart without angina pectoris    2  Essential hypertension    3  Mixed hyperlipidemia    4  History of percutaneous coronary intervention    5  Obesity (BMI 30-39  9)        Discussion Summary and Plan:  1  Coronary artery disease status post non ST elevation MI status post stenting of LCX with drug coated stent  All issues related to drug coated stent discusse discussed with patient  He has completed dual platelet therapy for more than 18 months  Continue aspirin 1 6 2 mg daily      2  Dyslipidemia  She has blood test done 2022 cholesterol profile susceptible     3  Essential hypertension  Patient blood pressure is pretty well controlled with current therapy  Vital stable  Continue metoprolol 25 twice a      4  Obesity with BMI around 31  He has gained some weight since last visit  That could cause some with exertion shortness of breath advised him to lose weight     5  Colon cancer screening  Patient can have his colon cancer screening his angioplasty was more than a year old now  Follow-up 6 months  EKG shows no change from previous EKG previous cath report reviewed  All questions related to coronary artery disease, need for blood thinner, statins, monitoring liver enzyme all discussed with patient all their questions answered to satisfaction  Follow-up 6 months  Please call [phone] if any questions  Counseling :  A description of the counseling  Goals and Barriers  Patient's ability to self care: Yes  Medication side effect reviewed with patient in detail and all their questions answered to their satisfaction  HPI :     Queen Aletha is a 63y o  year old male who came for follow up   Patient hasMedical history significant for hypertension dyslipidemia was recently admitted to [facility] with chest pain and underwent cardiac catheterization found to have significant stenosis of mid circumflex  Which was successfully stented with drug-eluting stent  He came for follow-up he is doing well denies any chest pain any shortness of breath no nausea no vomiting no fever no chills no PND no orthopnea no other issues at this time  06/14/2022  Above reviewed  Patient came for follow-up  He is doing well he had history of coronary artery disease status post non ST elevation MI status post stenting of mid circumflex with drug-eluting stent  He has been doing well since then  His vitals has been stable  His last blood test was in May 2021 which has been acceptable  His angioplasty was done in November 2020  Used to exercise lately he stopped he try to do it felt some exertional shortness of breath but no chest pain no other issues at this time  12/21/2022  Have reviewed  Patient came for follow-up he is doing well  History of coronary artery disease s/p non-ST elevation MI s/p stenting of mid circumflex with a drug-eluting stent  He is doing well since then  No nausea no vomiting no fever no chills no other cardiovascular issues  Angioplasty was done in November 2020  He has blood test done July 2022 which has been acceptable  He does exercise but he thinks he can do more  No other issues at this time  Review of Systems   Constitutional: Negative for activity change, chills, diaphoresis, fever and unexpected weight change  HENT: Negative for congestion  Eyes: Negative for discharge and redness  Respiratory: Positive for shortness of breath  Negative for cough, chest tightness and wheezing  Chronic exertional shortness of breath not much change   Cardiovascular: Negative  Negative for chest pain, palpitations and leg swelling     Gastrointestinal: Negative for abdominal pain, diarrhea and nausea  Endocrine: Negative  Genitourinary: Negative for decreased urine volume and urgency  Musculoskeletal: Negative  Negative for arthralgias, back pain and gait problem  Skin: Negative for rash and wound  Allergic/Immunologic: Negative  Neurological: Negative for dizziness, seizures, syncope, weakness, light-headedness and headaches  Hematological: Negative  Psychiatric/Behavioral: Negative for agitation and confusion  The patient is not nervous/anxious  Historical Information   Past Medical History:   Diagnosis Date   • Cancer ([facility] [address] )    • Dry eyes    • Spinal stenosis      Past Surgical History:   Procedure Laterality Date   • CORONARY ANGIOPLASTY WITH STENT PLACEMENT     • HERNIA REPAIR     • ROTATOR CUFF REPAIR       Social History     Substance and Sexual Activity   Alcohol Use Yes    Comment: socially      Social History     Substance and Sexual Activity   Drug Use Never     Social History     Tobacco Use   Smoking Status Never   Smokeless Tobacco Never     Family History: History reviewed  No pertinent family history  Meds/Allergies     No Known Allergies    Current Outpatient Medications:   •  aspirin 81 mg chewable tablet, Chew 2 tablets (162 mg total) daily, Disp: 30 tablet, Rfl: 0  •  atorvastatin (LIPITOR) 80 mg tablet, TAKE 1 TABLET BY MOUTH EVERY DAY WITH DINNER, Disp: 90 tablet, Rfl: 3  •  metoprolol tartrate (LOPRESSOR) 25 mg tablet, TAKE 1 TABLET (25 MG TOTAL) BY MOUTH EVERY 12 (TWELVE) HOURS, Disp: 180 tablet, Rfl: 3  •  Xiidra 5 % op solution, PLACE 1 DROP INTO EACH EYE EVERY 12 HOURS, Disp: , Rfl:   •  nitroglycerin (NITROSTAT) 0 4 mg SL tablet, Place 1 tablet (0 4 mg total) under the tongue every 5 (five) minutes as needed for chest pain (Patient not taking: Reported on 3/29/2022), Disp: 60 tablet, Rfl: 0    Vitals: Blood pressure 120/80, pulse 80, temperature (!) 96 °F (35 6 °C), height 5' 9" (1 753 m), weight 97 1 kg (214 lb), SpO2 99 %    ?  Body mass index is 31 6 kg/m²  Vitals:    12/21/22 1306   Weight: 97 1 kg (214 lb)     BP Readings from Last 3 Encounters:   12/21/22 120/80   06/14/22 120/80   06/07/22 127/78       Physical Exam  Constitutional:       General: He is not in acute distress  Appearance: He is well-developed  He is not diaphoretic  Neck:      Thyroid: No thyromegaly  Vascular: No JVD  Trachea: No tracheal deviation  Cardiovascular:      Rate and Rhythm: Normal rate and regular rhythm  Heart sounds: S1 normal and S2 normal  Heart sounds not distant  Murmur heard  Systolic (ejection) murmur is present with a grade of 2/6  No friction rub  No gallop  No S3 or S4 sounds  Pulmonary:      Effort: Pulmonary effort is normal  No respiratory distress  Breath sounds: Normal breath sounds  No wheezing or rales  Chest:      Chest wall: No tenderness  Abdominal:      General: Bowel sounds are normal  There is no distension  Palpations: Abdomen is soft  Tenderness: There is no abdominal tenderness  Musculoskeletal:         General: No deformity  Cervical back: Neck supple  Skin:     General: Skin is warm and dry  Coloration: Skin is not pale  Findings: No rash  Neurological:      Mental Status: He is alert and oriented to person, place, and time  Psychiatric:         Behavior: Behavior normal          Judgment: Judgment normal            Diagnostic Studies Review Cardio:    Cardiac catheterization reviewed  EKG:    Twelve lead EKG done 12/01/2020 shows normal sinus rhythm left axis deviation  Heart rate 84 beats per minute no significant changes  Twelve lead EKG 06/02/2021 shows normal sinus rhythm incomplete RBBB heart rate 66 beats per minute  No change from previous EKG    Lead EKG done 12/14/2021 shows normal sinus rhythm incomplete RBBB heart rate 61 beats per minute  Small Q-waves noted in inferior leads most likely pseudo infarct pattern no change from previous EKG      Twelve lead EKG done on 2022 shows normal sinus rhythm incomplete RBBB heart rate 67 beats per minute no change from previous EKG  Twelve-lead EKG 2022 shows normal sinus rhythm heart rate 80 bpm   Incomplete RBBB no change from previous EKG    Cardiac testing:   Results for orders placed during the hospital encounter of 20   Echo complete with contrast if indicated    Narrative [address]  [address] [facility]   [phone]    Transthoracic Echocardiogram  2D, M-mode, Doppler, and Color Doppler    Study date:  2020    Patient: Gia JAUREGUI  MR number: [id number]  Account number: [de-identified]  : 1960  Age: 64 years  Gender: Male  Status: Outpatient  Location: Bedside  Height: 69 in  Weight: 209 7 lb  BP: 128/ 74 mmHg    Indications: Heart Failure    Diagnoses: I50 9 - Heart failure, unspecified    Sonographer:  CLAY Hyde  Referring Physician:  Heath Gonzalez MD  Group:  [facility]  Interpreting Physician:  Jean Matthews MD    SUMMARY    LEFT VENTRICLE:  Systolic function was normal  Ejection fraction was estimated in the range of 60 % to 65 %  There were no regional wall motion abnormalities  MITRAL VALVE:  There was trace regurgitation  HISTORY: PRIOR HISTORY: Spinal Stenosis, HLD, Basal Cell Carcinoma    PROCEDURE: The procedure was performed at the bedside  This was a routine study  The transthoracic approach was used  The study included complete 2D imaging, M-mode, complete spectral Doppler, and color Doppler  The heart rate was 72 bpm,  at the start of the study  Image quality was adequate  LEFT VENTRICLE: Size was normal  Systolic function was normal  Ejection fraction was estimated in the range of 60 % to 65 %  There were no regional wall motion abnormalities  Wall thickness was normal  No evidence of apical thrombus    DOPPLER: Left ventricular diastolic function parameters were normal     RIGHT VENTRICLE: The size was normal  Systolic function was normal with TAPSE-1 9cm Wall thickness was normal     LEFT ATRIUM: Size was normal     RIGHT ATRIUM: Size was normal     MITRAL VALVE: There was mild thickening  There was normal leaflet separation  DOPPLER: The transmitral velocity was within the normal range  There was no evidence for stenosis  There was trace regurgitation  AORTIC VALVE: The valve was trileaflet  Leaflets exhibited normal thickness and normal cuspal separation  DOPPLER: Transaortic velocity was within the normal range  There was no evidence for stenosis  There was no significant  regurgitation  TRICUSPID VALVE: The valve structure was normal  There was normal leaflet separation  DOPPLER: The transtricuspid velocity was within the normal range  There was no evidence for stenosis  There was no significant regurgitation  PULMONIC VALVE: Leaflets exhibited normal thickness, no calcification, and normal cuspal separation  DOPPLER: The transpulmonic velocity was within the normal range  There was no significant regurgitation  PERICARDIUM: There was no pericardial effusion  The pericardium was normal in appearance  AORTA: The root exhibited normal size  SYSTEMIC VEINS: IVC: The inferior vena cava was normal in size  SYSTEM MEASUREMENT TABLES    2D  EF (Teich): 66 69 %    PW  MV E/A Ratio: 0 87    [employer] Accredited Echocardiography Laboratory    Prepared and electronically signed by    Jean-Claude Norris MD  Signed [id number] 13:25:07       No results found for this or any previous visit    Results for orders placed during the hospital encounter of 20   Cardiac catheterization    Narrative [place] [address], [address] [facility]  [phone]    [facility]    Invasive Cardiovascular Lab Complete Report    Patient: Binu JAUREGUI  MR number: [id number]  Account number: [de-identified]  Study date: 2020  Gender: Male  : 1960  Height: 68 9 in  Weight: 206 4 lb  BSA: 2 09 mï¾²    Allergies: NO KNOWN ALLERGIES    Diagnostic Cardiologist:  Rob Mcdonald MD  Interventional Cardiologist:  Rob Mcdonald MD  Primary Physician:  Shreya Menjivar MD    SUMMARY    CORONARY CIRCULATION:  Mid circumflex: There was a discrete 95 % stenosis  1ST LESION INTERVENTIONS:  A drug-eluting stent procedure was performed on the 95 % lesion in the mid circumflex  Following intervention there was an excellent angiographic appearance with a 0 % residual stenosis  A Xience Torie Rx 2 75 x 18mm drug-eluting stent was placed across the lesion and deployed at a maximum inflation pressure of 12 yong  RECOMMENDATIONS:  Patient management should include statin therapy  Patient management to include dual antiplatelet therapy  INDICATIONS:  --  Coronary artery disease: significant (50% or greater) lesion in a major coronary artery  PROCEDURES PERFORMED    --  Right coronary angiography  --  Inpatient  --  Mod Sedation Same Physician Initial 15min  --  Coronary Catheterization (w/o LHC)  --  Coronary Drug Eluting Stent w/PTCA  --  Intervention on mid circumflex: drug-eluting stent  PROCEDURE: The risks and alternatives of the procedures and conscious sedation were explained to the patient and informed consent was obtained  The patient was brought to the cath lab and placed on the table  The planned puncture sites  were prepped and draped in the usual sterile fashion  --  Right radial artery access  After performing an Sandro's test to verify adequate ulnar artery supply to the hand, the radial site was prepped  The puncture site was infiltrated with local anesthetic  The vessel was accessed using the  modified Seldinger technique, a wire was advanced into the vessel, and a sheath was advanced over the wire into the vessel  --  Right coronary artery angiography   A catheter was advanced over a guidewire into the aorta and positioned in the right coronary artery ostium under fluoroscopic guidance  Angiography was performed  --  Inpatient  --  Mod Sedation Same Physician Initial 15min  --  Coronary Catheterization (w/o [facility])  LESION INTERVENTION: A drug-eluting stent procedure was performed on the 95 % lesion in the mid circumflex  Following intervention there was an excellent angiographic appearance with a 0 % residual stenosis  This was a bifurcation lesion  This was an ACC/AHA type A "low risk" lesion for intervention  There was no evidence of the transient no-reflow phenomenon  There was KEO 3 flow before the procedure and KEO 3 flow after the procedure  --  A Xience Torie Rx 2 75 x 18mm drug-eluting stent was placed across the lesion and deployed at a maximum inflation pressure of 12 yong  INTERVENTIONS:  --  Coronary Drug Eluting Stent w/PTCA  PROCEDURE COMPLETION: The patient tolerated the procedure well and was discharged from the cath lab  TIMING: Test started at 08:52  Test concluded at 09:10  HEMOSTASIS: The sheath was removed  The site was compressed with a Hemoband  device  Hemostasis was obtained  MEDICATIONS GIVEN: 1% Lidocaine, 1 ml, subcutaneously, at 08:54  Baby Aspirin, 81 mg, PO, at 08:54  Effient, 10 mg, PO, at 08:54  Verapamil (5mg/2ml), 2 5 mg, IV, at 08:57  Heparin 1000 units/ml, 7,000  units, IV, at 08:57  Nitroglycerin (200mcg/ml), 200 mcg, at 08:58  Fentanyl (1OOmcg/2 ml), 50 mcg, IV, at 09:10  Versed (2mg/2ml), 2 mg, IV, at 09:10  0 9 NSS, infusion rate of 75 ml/hr, IV, at 09:10     CORONARY VESSELS:   --  Circumflex: The vessel was large sized  Angiography showed mild atherosclerosis  --  Mid circumflex: The vessel was large sized  There was a discrete 95 % stenosis  RECOMMENDATIONS  Patient management should include statin therapy  Patient management to include dual antiplatelet therapy  DISPOSITION:  The patient left the catheterization laboratory in stable condition      Prepared and signed by    Venus Wood MD  Signed 2020 09:20:57    Study diagram    Angiographic findings  Native coronary lesions:  ï¾·Mid circumflex: Lesion 1: discrete, 95 % stenosis  Intervention results  Native coronary lesions:  ï¾·drug-eluting stent of the 95 % stenosis in mid circumflex  Appearance excellent with 0 % residual stenosis  Stent: Luis Eduardo Lavonia Rx 2 75 x 18mm drug-eluting  Hemodynamic tables    Pressures:  Baseline  Pressures:  - HR: 74  Pressures:  - Rhythm:  Pressures:  -- Aortic Pressure (S/D/M): 122/76/41    Outputs:  Baseline  Outputs:  -- CALCULATIONS: [age]  Outputs:  -- CALCULATIONS: Body Surface Area: 2 09  Outputs:  -- CALCULATIONS: Height in cm: 175 00  Outputs:  -- CALCULATIONS: Sex: Male  Outputs:  -- CALCULATIONS: Weight in k 80         Imaging:  Chest X-Ray:   No Chest XR results available for this patient  CT-scan of the chest:     Cta Dissection Protocol Chest Abdomen Pelvis W Wo Contrast    Result Date: 2020  Impression 1  No evidence of aortic dissection, intramural hematoma or aneurysm  2   Hepatic fatty infiltration  3   Mild sigmoid diverticulosis without acute diverticulitis   Workstation performed: [id number]     Lab Review   Lab Results   Component Value Date    WBC 7 91 2022    HGB 15 4 2022    HCT 45 3 2022    MCV 91 2022    RDW 12 6 2022     2022     BMP:  Lab Results   Component Value Date    SODIUM 141 2022    K 4 3 2022     (H) 2022    CO2 31 2022    BUN 14 2022    CREATININE 1 03 2022    GLUC 118 2020    GLUF 131 (H) 2022    CALCIUM 9 2 2022    CORRECTEDCA 9 0 2020    EGFR 79 2022    MG 2 1 2020     LFT:  Lab Results   Component Value Date    AST 20 2022    ALT 34 2022    ALKPHOS 70 2022    TP 7 0 2022    ALB 3 7 2022      No components found for: [facility]  Lab Results   Component Value Date [id number] 1 670 07/28/2022     Lab Results   Component Value Date    HGBA1C 5 7 (H) 11/22/2020     Lipid Profile:   Lab Results   Component Value Date    CHOLESTEROL 145 07/28/2022    HDL 44 07/28/2022    LDLCALC 66 07/28/2022    TRIG 174 (H) 07/28/2022     Lab Results   Component Value Date    CHOLESTEROL 145 07/28/2022    CHOLESTEROL 131 06/15/2021     Lab Results   Component Value Date    TROPONINI 0 07 (H) 11/23/2020     Lab Results   Component Value Date    NTBNP 231 (H) 11/22/2020          Dr Primo Krishnan MD [facility]      "This note has been constructed using a voice recognition system  Therefore there may be syntax, spelling, and/or grammatical errors   Please call if you have any questions  "

## 2023-03-23 ENCOUNTER — OFFICE VISIT (OUTPATIENT)
Dept: FAMILY MEDICINE CLINIC | Facility: CLINIC | Age: 63
End: 2023-03-23

## 2023-03-23 VITALS
SYSTOLIC BLOOD PRESSURE: 140 MMHG | OXYGEN SATURATION: 98 % | WEIGHT: 212.06 LBS | BODY MASS INDEX: 31.41 KG/M2 | RESPIRATION RATE: 21 BRPM | HEART RATE: 76 BPM | TEMPERATURE: 98.5 F | DIASTOLIC BLOOD PRESSURE: 80 MMHG | HEIGHT: 69 IN

## 2023-03-23 DIAGNOSIS — I21.4 NSTEMI (NON-ST ELEVATED MYOCARDIAL INFARCTION) (HCC): Primary | ICD-10-CM

## 2023-03-23 DIAGNOSIS — Z00.00 WELL ADULT EXAM: ICD-10-CM

## 2023-03-23 DIAGNOSIS — E78.2 MIXED HYPERLIPIDEMIA: ICD-10-CM

## 2023-03-24 NOTE — PROGRESS NOTES
Name: Ankit De La Paz      : 1960      MRN: [id number]  Encounter Provider: Barak Barrera DO  Encounter Date: 3/23/2023   [facility]     1  Well adult exam  -     Pneumococcal Conjugate Vaccine 20-valent (Pcv20)    2  Mixed hyperlipidemia  -     Comprehensive metabolic panel; Future; Expected date: 2023  -     Lipid panel; Future    3  NSTEMI (non-ST elevated myocardial infarction) [facility])           Subjective      This is a 63year-old new patient to the practice  He is rejoining practice after being away for over 3 years  The patient does have a past medical history of coronary artery disease and stents placed secondary to this problem  He has been followed by Dr Lou Granger in the past for CAD  The patient states significant weight loss after his episode of admission to [address] [place] [address] for CAD several years ago  He has been on a statin since the CAD diagnosis  He denies any present problems today  He is requesting a well exam   The patient is up-to-date with flu vaccinations  He is also up-to-date with for COVID vaccinations  The patient is  and has 4 adult children  Review of Systems   Constitutional: Negative  HENT: Negative  Eyes: Negative  Respiratory: Negative  Cardiovascular: Negative  Gastrointestinal: Negative  Endocrine: Negative  Genitourinary:        The patient states one-time episode of urination at nighttime but he believes is secondary to too many fluids before bedtime  Musculoskeletal: Negative  Skin: Negative  Allergic/Immunologic: Negative  Neurological: Negative  Hematological: Negative  Psychiatric/Behavioral: Negative  All other systems reviewed and are negative        Current Outpatient Medications on File Prior to Visit   Medication Sig   • aspirin 81 mg chewable tablet Chew 2 tablets (162 mg total) daily   • atorvastatin (LIPITOR) 80 mg tablet TAKE 1 TABLET BY MOUTH EVERY DAY WITH DINNER   • metoprolol tartrate (LOPRESSOR) 25 mg tablet TAKE 1 TABLET (25 MG TOTAL) BY MOUTH EVERY 12 (TWELVE) HOURS   • nitroglycerin (NITROSTAT) 0 4 mg SL tablet Place 1 tablet (0 4 mg total) under the tongue every 5 (five) minutes as needed for chest pain   • Xiidra 5 % op solution PLACE 1 DROP INTO EACH EYE EVERY 12 HOURS       Objective     /80 (BP Location: Left arm, Patient Position: Sitting, Cuff Size: Large)   Pulse 76   Temp 98 5 °F (36 9 °C) (Temporal)   Resp 21   Ht 5' 9" (1 753 m)   Wt 96 2 kg (212 lb 1 oz)   SpO2 98%   BMI 31 32 kg/m²     Physical Exam  Constitutional:       Comments: Patient is mildly obese with a BMI of 31 32   HENT:      Right Ear: Tympanic membrane, ear canal and external ear normal       Left Ear: Tympanic membrane, ear canal and external ear normal       Nose: Nose normal       Mouth/Throat:      Mouth: Mucous membranes are moist       Pharynx: Oropharynx is clear  Eyes:      Extraocular Movements: Extraocular movements intact  Conjunctiva/sclera: Conjunctivae normal       Pupils: Pupils are equal, round, and reactive to light  Cardiovascular:      Rate and Rhythm: Normal rate and regular rhythm  Pulses: Normal pulses  Heart sounds: Normal heart sounds  Pulmonary:      Effort: Pulmonary effort is normal       Breath sounds: Normal breath sounds  Abdominal:      General: Abdomen is flat  Bowel sounds are normal       Palpations: Abdomen is soft  Musculoskeletal:         General: Normal range of motion  Cervical back: Normal range of motion and neck supple  Skin:     General: Skin is warm  Capillary Refill: Capillary refill takes less than 2 seconds  Neurological:      General: No focal deficit present  Mental Status: He is alert  Psychiatric:         Mood and Affect: Mood normal          Behavior: Behavior normal          Thought Content:  Thought content normal          Judgment: Judgment normal  Wil Olivarez, DO

## 2023-03-25 NOTE — PATIENT INSTRUCTIONS
This patient's physical exam was within normal limits other than his mild obesity  The patient states he is starting an exercise and diet regimen to get his weight down to approximately 185 lbs   The patient does have a history of hyperlipidemia and he is on a statin at this time  The patient should continue on his statin  He was given an order for a Chem-20 and a lipid profile  He was given a pneumococcal 20 vaccination today  [facility] and will be able to see the results of his blood work  I will call the patient with the results of the blood work and any needed changes after seeing those results    At this point the patient should be seen at least yearly for well physical exam

## 2023-05-13 ENCOUNTER — HOSPITAL ENCOUNTER (EMERGENCY)
Facility: HOSPITAL | Age: 63
Discharge: HOME | End: 2023-05-13
Attending: EMERGENCY MEDICINE

## 2023-05-13 VITALS
SYSTOLIC BLOOD PRESSURE: 133 MMHG | TEMPERATURE: 97.4 F | DIASTOLIC BLOOD PRESSURE: 81 MMHG | OXYGEN SATURATION: 96 % | HEART RATE: 61 BPM | RESPIRATION RATE: 18 BRPM

## 2023-05-13 DIAGNOSIS — R31.0 GROSS HEMATURIA: Primary | ICD-10-CM

## 2023-05-13 LAB
BILIRUB UR QL STRIP: NEGATIVE
CLARITY UR: CLEAR
COLOR UR: NORMAL
GLUCOSE UR STRIP-MCNC: NEGATIVE MG/DL
HGB UR QL STRIP.AUTO: NEGATIVE
KETONES UR STRIP-MCNC: NEGATIVE MG/DL
LEUKOCYTE ESTERASE UR QL STRIP: NEGATIVE
NITRITE UR QL STRIP: NEGATIVE
PH UR STRIP.AUTO: 5.5 [PH]
PROT UR STRIP-MCNC: NEGATIVE MG/DL
SP GR UR STRIP.AUTO: 1.02 (ref 1–1.03)
UROBILINOGEN UR STRIP-ACNC: <2 MG/DL

## 2023-05-13 NOTE — ED PROVIDER NOTES
History  Chief Complaint   Patient presents with   • Blood in Urine     Pt to ED with c/o blood in urine 2 days ago  Pt states has not happened since, denies any pain     82-year-old male coming into the ED for evaluation of gross hematuria that occurred 2 days ago  He states he noticed it twice when he urinated and then has resolved  At this point his urine is yellow  He denies fevers, chills, dysuria, flank pain, abdominal pain  He is a non-smoker, nondrinker  He does use aspirin daily due to a history of CAD with a stent  He has no history of urinary tract malignancy  His significant other at bedside states she has prior experience with her ex spouse who had bladder cancer that was not diagnosed until it was stage III, and he had no symptoms  That prompted her to bring him into the ER for evaluation  History provided by:  Patient   used: No    Blood in Urine        Prior to Admission Medications   Prescriptions Last Dose Informant Patient Reported? Taking?    Xiidra 5 % op solution   Yes No   Sig: PLACE 1 DROP INTO EACH EYE EVERY 12 HOURS   aspirin 81 mg chewable tablet   No No   Sig: Chew 2 tablets (162 mg total) daily   atorvastatin (LIPITOR) 80 mg tablet   No No   Sig: TAKE 1 TABLET BY MOUTH EVERY DAY WITH DINNER   metoprolol tartrate (LOPRESSOR) 25 mg tablet   No No   Sig: TAKE 1 TABLET (25 MG TOTAL) BY MOUTH EVERY 12 (TWELVE) HOURS   nitroglycerin (NITROSTAT) 0 4 mg SL tablet   No No   Sig: Place 1 tablet (0 4 mg total) under the tongue every 5 (five) minutes as needed for chest pain      Facility-Administered Medications: None       Past Medical History:   Diagnosis Date   • Cancer ([facility] [address] )    • Dry eyes    • Heart attack ([facility] [address] ) 2020 1 stent   • Spinal stenosis        Past Surgical History:   Procedure Laterality Date   • CORONARY ANGIOPLASTY WITH STENT PLACEMENT     • HERNIA REPAIR     • ROTATOR CUFF REPAIR         Family History   Problem Relation Age of Onset   • Dementia Mother    • Diabetes Father    • Peripheral vascular disease Father    • Diabetes Sister    • Diabetes Brother      I have reviewed and agree with the history as documented  E-Cigarette/Vaping   • E-Cigarette Use Never User      E-Cigarette/Vaping Substances   • Nicotine No    • THC No    • CBD No    • Flavoring No    • Other No    • Unknown No      Social History     Tobacco Use   • Smoking status: Never   • Smokeless tobacco: Never   Vaping Use   • Vaping Use: Never used   Substance Use Topics   • Alcohol use: Yes     Comment: socially    • Drug use: Yes     Types: Marijuana     Comment: Gummies to help him sleep       Review of Systems   Genitourinary: Positive for hematuria  All other systems reviewed and are negative  Physical Exam  Physical Exam  Vitals and nursing note reviewed  Constitutional:       General: He is not in acute distress  Appearance: Normal appearance  He is normal weight  He is not ill-appearing, toxic-appearing or diaphoretic  HENT:      Head: Normocephalic and atraumatic  Right Ear: External ear normal       Left Ear: External ear normal       Nose: Nose normal  No congestion or rhinorrhea  Mouth/Throat:      Mouth: Mucous membranes are moist       Pharynx: Oropharynx is clear  Eyes:      General: No scleral icterus  Right eye: No discharge  Left eye: No discharge  Conjunctiva/sclera: Conjunctivae normal    Cardiovascular:      Rate and Rhythm: Normal rate and regular rhythm  Pulses: Normal pulses  Heart sounds: Normal heart sounds  Pulmonary:      Effort: Pulmonary effort is normal  No respiratory distress  Breath sounds: Normal breath sounds  Abdominal:      General: Abdomen is flat  There is no distension  Palpations: Abdomen is soft  There is no mass  Tenderness: There is no abdominal tenderness  There is no right CVA tenderness, left CVA tenderness, guarding or rebound  Hernia: No hernia is present  Genitourinary:     Penis: Normal        Testes: Normal    Musculoskeletal:         General: No swelling  Normal range of motion  Cervical back: Normal range of motion and neck supple  Skin:     General: Skin is warm and dry  Capillary Refill: Capillary refill takes less than 2 seconds  Neurological:      General: No focal deficit present  Mental Status: He is alert and oriented to person, place, and time  Mental status is at baseline     Psychiatric:         Mood and Affect: Mood normal          Behavior: Behavior normal          Vital Signs  ED Triage Vitals   Temperature Pulse Respirations Blood Pressure SpO2   05/13/23 0949 05/13/23 0949 05/13/23 0949 05/13/23 0949 05/13/23 0949   (!) 97 4 °F (36 3 °C) 72 18 139/81 97 %      Temp src Heart Rate Source Patient Position - Orthostatic VS BP Location FiO2 (%)   -- 05/13/23 1127 05/13/23 1127 05/13/23 1127 --    Monitor Sitting Right arm       Pain Score       --                  Vitals:    05/13/23 0949 05/13/23 1127 05/13/23 1200   BP: 139/81 135/89 133/81   Pulse: 72 67 61   Patient Position - Orthostatic VS:  Sitting          Visual Acuity      ED Medications  Medications - No data to display    Diagnostic Studies  Results Reviewed     Procedure Component Value Units Date/Time    UA w Reflex to Microscopic w Reflex to Culture [[id number]] Collected: 05/13/23 1124    Lab Status: Final result Specimen: Urine, Clean Catch Updated: 05/13/23 1157     Color, UA Light Yellow     Clarity, UA Clear     Specific Gravity, UA 1 022     pH, UA 5 5     Leukocytes, UA Negative     Nitrite, UA Negative     Protein, UA Negative mg/dl      Glucose, UA Negative mg/dl      Ketones, UA Negative mg/dl      Urobilinogen, UA <2 0 mg/dl      Bilirubin, UA Negative     Occult Blood, UA Negative                 No orders to display              Procedures  Procedures         ED Course                                             Medical Decision Making  64 yo M w/ asymptomatic hematuria, resolved after he noted it in his urine twice  No symptoms whatsoever fortunately  UA in the ED negative for macro or microscopic hematuria  DDx includes idiopathic hematuria, urinary tract tumor/malignancy, kidney stone, testicular mass  Physical exam normal - no testicular mass or tenderness on exam  No abd tenderness or flank tenderness  Stable for d/c home, follow up with PCP, Urology for further workup to r/o malignancy, patient understands the importance of f/u instructions  Gross hematuria: acute illness or injury      Disposition  Final diagnoses:   Gross hematuria     Time reflects when diagnosis was documented in both MDM as applicable and the Disposition within this note     Time User Action Codes Description Comment    5/13/2023  1:06 PM Willye Press [R31 9] Hematuria     5/13/2023  1:06 PM Moraima Arreola Remove [R31 9] Hematuria     5/13/2023  1:06 PM Purnell Cockayne, [address] [R31 0] Gross hematuria       ED Disposition     ED Disposition   Discharge    Condition   Stable    Date/Time   Sat May 13, 2023  1:06 PM    Comment   [address] discharge to home/self care                 Follow-up Information     Follow up With Specialties Details Why Contact Info Additional [address] [facility]   [phone]  9  [facility] Olamide Garcia [address] [place] [address] Saundra Nobles, [place], [address] [facility]   [facility] 90  [phone]             Discharge Medication List as of 5/13/2023  1:07 PM      CONTINUE these medications which have NOT CHANGED    Details   aspirin 81 mg chewable tablet Chew 2 tablets (162 mg total) daily, Starting Thu 11/26/2020, Normal      atorvastatin (LIPITOR) 80 mg tablet TAKE 1 TABLET BY MOUTH EVERY DAY WITH DINNER, Normal      metoprolol tartrate (LOPRESSOR) 25 mg tablet TAKE 1 TABLET (25 MG TOTAL) BY MOUTH EVERY 12 (TWELVE) HOURS, Starting Thu 10/6/2022, Normal      nitroglycerin (NITROSTAT) 0 4 mg SL tablet Place 1 tablet (0 4 mg total) under the tongue every 5 (five) minutes as needed for chest pain, Starting Tue 7/27/2021, Normal      Xiidra 5 % op solution PLACE 1 DROP INTO EACH EYE EVERY 12 HOURS, Historical Med                 PDMP Review     None          ED Provider  Electronically Signed by           Sara Rodriguez DO  05/17/23 2054

## 2023-05-17 ENCOUNTER — APPOINTMENT (OUTPATIENT)
Dept: LAB | Facility: CLINIC | Age: 63
End: 2023-05-17

## 2023-05-17 DIAGNOSIS — E78.2 MIXED HYPERLIPIDEMIA: ICD-10-CM

## 2023-05-17 LAB
ALBUMIN SERPL BCP-MCNC: 3.7 G/DL (ref 3.5–5)
ALP SERPL-CCNC: 79 U/L (ref 46–116)
ALT SERPL W P-5'-P-CCNC: 30 U/L (ref 12–78)
ANION GAP SERPL CALCULATED.3IONS-SCNC: -2 MMOL/L (ref 4–13)
AST SERPL W P-5'-P-CCNC: 16 U/L (ref 5–45)
BILIRUB SERPL-MCNC: 0.91 MG/DL (ref 0.2–1)
BUN SERPL-MCNC: 13 MG/DL (ref 5–25)
CALCIUM SERPL-MCNC: 8.9 MG/DL (ref 8.3–10.1)
CHLORIDE SERPL-SCNC: 112 MMOL/L (ref 96–108)
CHOLEST SERPL-MCNC: 136 MG/DL
CO2 SERPL-SCNC: 28 MMOL/L (ref 21–32)
CREAT SERPL-MCNC: 0.99 MG/DL (ref 0.6–1.3)
GFR SERPL CREATININE-BSD FRML MDRD: 82 ML/MIN/1.73SQ M
GLUCOSE P FAST SERPL-MCNC: 121 MG/DL (ref 65–99)
HDLC SERPL-MCNC: 47 MG/DL
LDLC SERPL CALC-MCNC: 63 MG/DL (ref 0–100)
NONHDLC SERPL-MCNC: 89 MG/DL
POTASSIUM SERPL-SCNC: 5 MMOL/L (ref 3.5–5.3)
PROT SERPL-MCNC: 7 G/DL (ref 6.4–8.4)
SODIUM SERPL-SCNC: 138 MMOL/L (ref 135–147)
TRIGL SERPL-MCNC: 129 MG/DL

## 2023-05-18 ENCOUNTER — TELEPHONE (OUTPATIENT)
Dept: UROLOGY | Facility: MEDICAL CENTER | Age: 63
End: 2023-05-18

## 2023-05-18 NOTE — TELEPHONE ENCOUNTER
New Patient    What is the reason for the patient’s appointment?:  Pt called to scheduled an appt for ER follow up for gross hematuria   What office location does the patient prefer?: [facility]     Does patient have Imaging/Lab Results:    Have patient records been requested?:  If No, are the records showing in Epic:       INSURANCE:  Do we accept the patient's insurance or is the patient Self-Pay?:    Insurance Provider: Anastasia   Plan Type/Number:  Member ID#:       HISTORY:   Has the patient had any previous Urologist(s)?: no     Was the patient seen in the ED?: yes     Has the patient had any outside testing done?:no     Does the patient have a personal history of cancer?:  No

## 2023-05-24 ENCOUNTER — OFFICE VISIT (OUTPATIENT)
Dept: UROLOGY | Facility: CLINIC | Age: 63
End: 2023-05-24

## 2023-05-24 VITALS
RESPIRATION RATE: 16 BRPM | OXYGEN SATURATION: 98 % | DIASTOLIC BLOOD PRESSURE: 78 MMHG | HEART RATE: 77 BPM | HEIGHT: 69 IN | BODY MASS INDEX: 31.76 KG/M2 | WEIGHT: 214.4 LBS | SYSTOLIC BLOOD PRESSURE: 128 MMHG

## 2023-05-24 DIAGNOSIS — N13.8 BPH WITH OBSTRUCTION/LOWER URINARY TRACT SYMPTOMS: ICD-10-CM

## 2023-05-24 DIAGNOSIS — N40.1 BPH WITH OBSTRUCTION/LOWER URINARY TRACT SYMPTOMS: ICD-10-CM

## 2023-05-24 DIAGNOSIS — Z12.5 PROSTATE CANCER SCREENING: Primary | ICD-10-CM

## 2023-05-24 DIAGNOSIS — R31.0 GROSS HEMATURIA: ICD-10-CM

## 2023-05-24 LAB
BACTERIA UR QL AUTO: ABNORMAL /HPF
BILIRUB UR QL STRIP: NEGATIVE
CLARITY UR: CLEAR
COLOR UR: YELLOW
GLUCOSE UR STRIP-MCNC: NEGATIVE MG/DL
HGB UR QL STRIP.AUTO: ABNORMAL
KETONES UR STRIP-MCNC: NEGATIVE MG/DL
LEUKOCYTE ESTERASE UR QL STRIP: ABNORMAL
MUCOUS THREADS UR QL AUTO: ABNORMAL
NITRITE UR QL STRIP: NEGATIVE
NON-SQ EPI CELLS URNS QL MICRO: ABNORMAL /HPF
PH UR STRIP.AUTO: 6 [PH]
POST-VOID RESIDUAL VOLUME, ML POC: 18 ML
PROT UR STRIP-MCNC: ABNORMAL MG/DL
RBC #/AREA URNS AUTO: ABNORMAL /HPF
SL AMB  POCT GLUCOSE, UA: NORMAL
SL AMB LEUKOCYTE ESTERASE,UA: NORMAL
SL AMB POCT BILIRUBIN,UA: NORMAL
SL AMB POCT BLOOD,UA: NORMAL
SL AMB POCT CLARITY,UA: CLEAR
SL AMB POCT COLOR,UA: YELLOW
SL AMB POCT KETONES,UA: NORMAL
SL AMB POCT NITRITE,UA: NORMAL
SL AMB POCT PH,UA: 5
SL AMB POCT SPECIFIC GRAVITY,UA: 1015
SL AMB POCT URINE PROTEIN: NORMAL
SL AMB POCT UROBILINOGEN: 0.2
SP GR UR STRIP.AUTO: 1.03 (ref 1–1.03)
UROBILINOGEN UR STRIP-ACNC: <2 MG/DL
WBC #/AREA URNS AUTO: ABNORMAL /HPF

## 2023-05-24 RX ORDER — TAMSULOSIN HYDROCHLORIDE 0.4 MG/1
0.4 CAPSULE ORAL
Qty: 30 CAPSULE | Refills: 1 | Status: SHIPPED | OUTPATIENT
Start: 2023-05-24

## 2023-05-24 NOTE — PROGRESS NOTES
Office Visit- Urology  Marlene Ahmadi 1960 MRN: [id number]      Assessment/Discussion/Plan    63 y o  male managed by     1  Gross hematuria  -See HPI below  -No smoking history, occupational exposure to potentially carcinogenic chemicals, or family history  -UA today with small amount of blood  -We will arrange for full work-up including urine cytology, CT renal protocol, and cystoscopy  -Discussed options of cystoscopy with or without anesthesia initially due to performing cystoscopies at [facility]   At this time patient wishes to proceed with cystoscopy without anesthesia  -Consent form was not signed today  Sign on day of procedure  -Proceed with cystoscopy    2  Prostate cancer screening  -No known family history  -JAMES today was not concerning for prostate cancer  -Patient to obtain PSA  Advise to avoid obtaining for about 2 weeks due to prostate exam today  Patient should avoid sexual activity, bike riding, motorcycle riding before obtainment of PSA as well    3  BPH with LUTS  -PVR 18 ml   -Patient bothered by his current urinary symptoms  -Discussed pharmacotherapy  Patient would like to trial Flomax 0 4mg  Discussed administration and side effects  -assess response at time of cysto      Chief Complaint:   Elke Coreas is a 63 y o  male presenting to the office for an initial evaluation regarding  Gross hematuria        Subjective  -63-year-old male with a past medical history significant for MI in 2020 status post coronary angioplasty with stent placement  On aspirin  -No prior urologic history  -Patient presents the office for evaluation of gross hematuria  -Stated at that last week he had 2 episodes of blood within the urine and his urine was discolored a [employer] color he also notes that there is spots of maroon that he noticed within the toilet bowl  No further episodes of hematuria     -This was patient's first episodes of gross hematuria  -He was evaluated in the ER on 5/13 at which time urine testing did not demonstrate microscopic hematuria nor was her concern for infection  -Patient endorses bothersome urinary symptoms including frequency and urgency as well as nocturia  -No dysuria or flank pain  -Patient denies a smoking history  -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 0 (P)     How often have you had to urinate again less than two hours after you finished urinating? 3 (P)     How often have you found you stopped and started again several times when you urinate? 1 (P)     How often have you found it difficult to postpone urination? 0 (P)     How often have you had a weak urinary stream? 1 (P)     How often have you had to push or strain to begin urination? 0 (P)     How many times did you most typically get up to urinate from the time you went to bed at night until the time you got up in the morning? 5 (P)     Quality of Life: If you were to spend the rest of your life with your urinary condition just the way it is now, how would you feel about that? 1 (P)     AUA SYMPTOM SCORE 10 (P)           ROS:   Review of Systems   Constitutional: Negative  Negative for chills, fatigue and fever  HENT: Negative  Eyes: Negative  Respiratory: Negative  Negative for shortness of breath  Cardiovascular: Negative  Negative for chest pain  Gastrointestinal: Negative  Endocrine: Negative  Allergic/Immunologic: Negative  Neurological: Negative  Psychiatric/Behavioral: Negative            Past Medical History  Past Medical History:   Diagnosis Date   • Cancer [facility])    • Dry eyes    • Heart attack ([facility] [address] ) 2020    1 stent   • Spinal stenosis        Past Surgical History  Past Surgical History:   Procedure Laterality Date   • CORONARY ANGIOPLASTY WITH STENT PLACEMENT     • HERNIA REPAIR     • ROTATOR CUFF REPAIR         Past Family History  Family History   Problem Relation Age of Onset   • Dementia Mother    • Diabetes Father    • Peripheral vascular disease Father    • Diabetes Sister    • Diabetes Brother        Past Social history  Social History     Socioeconomic History   • Marital status:      Spouse name: Not on file   • Number of children: Not on file   • Years of education: Not on file   • Highest education level: Not on file   Occupational History   • Not on file   Tobacco Use   • Smoking status: Never   • Smokeless tobacco: Never   Vaping Use   • Vaping Use: Never used   Substance and Sexual Activity   • Alcohol use: Yes     Comment: socially    • Drug use: Yes     Types: Marijuana     Comment: Gummies to help him sleep   • Sexual activity: Not on file   Other Topics Concern   • Not on file   Social History Narrative   • Not on file     Social Determinants of Health     Financial Resource Strain: Not on file   Food Insecurity: Not on file   Transportation Needs: Not on file   Physical Activity: Not on file   Stress: Not on file   Social Connections: Not on file   Intimate Partner Violence: Not on file   Housing Stability: Not on file       Current Medications  Current Outpatient Medications   Medication Sig Dispense Refill   • aspirin 81 mg chewable tablet Chew 2 tablets (162 mg total) daily 30 tablet 0   • atorvastatin (LIPITOR) 80 mg tablet TAKE 1 TABLET BY MOUTH EVERY DAY WITH DINNER 90 tablet 3   • metoprolol tartrate (LOPRESSOR) 25 mg tablet TAKE 1 TABLET (25 MG TOTAL) BY MOUTH EVERY 12 (TWELVE) HOURS 180 tablet 3   • nitroglycerin (NITROSTAT) 0 4 mg SL tablet Place 1 tablet (0 4 mg total) under the tongue every 5 (five) minutes as needed for chest pain 60 "tablet 0   • Xiidra 5 % op solution PLACE 1 DROP INTO EACH EYE EVERY 12 HOURS       No current facility-administered medications for this visit  Allergies  No Known Allergies    OBJECTIVE    Vitals   Vitals:    05/24/23 1013   BP: 128/78   BP Location: Left arm   Patient Position: Sitting   Cuff Size: Large   Pulse: 77   Resp: 16   SpO2: 98%   Weight: 97 3 kg (214 lb 6 4 oz)   Height: 5' 9\" (1 753 m)       PVR:    Physical Exam  Vitals reviewed  Constitutional:       General: He is not in acute distress  Appearance: Normal appearance  He is normal weight  He is not ill-appearing or toxic-appearing  HENT:      Head: Normocephalic and atraumatic  Right Ear: External ear normal       Left Ear: External ear normal    Eyes:      Conjunctiva/sclera: Conjunctivae normal    Cardiovascular:      Rate and Rhythm: Normal rate  Pulmonary:      Effort: Pulmonary effort is normal  No respiratory distress  Genitourinary:     Comments: Prostate exam reveals mildly enlarged prostate with no appreciated induration, asymmetry, or nodules  Musculoskeletal:         General: Normal range of motion  Cervical back: Normal range of motion and neck supple  Skin:     General: Skin is warm and dry  Neurological:      General: No focal deficit present  Mental Status: He is alert  Cranial Nerves: No cranial nerve deficit  Psychiatric:         Mood and Affect: Mood normal          Behavior: Behavior normal          Thought Content:  Thought content normal           Labs:     Lab Results   Component Value Date    CREATININE 0 99 05/17/2023      Lab Results   Component Value Date    HGBA1C 5 7 (H) 11/22/2020     Lab Results   Component Value Date    BUN 13 05/17/2023    CALCIUM 8 9 05/17/2023     (H) 05/17/2023    CO2 28 05/17/2023    CREATININE 0 99 05/17/2023    K 5 0 05/17/2023       I have personally reviewed all pertinent lab results and reviewed with patient    Imaging       Marcela Spencer " Sami Gillespie PA-C  Date: 5/24/2023 Time: 10:23 AM  [facility]    This note was written using fluency dictation software  Please excuse any resulting minor grammatical errors

## 2023-05-24 NOTE — PATIENT INSTRUCTIONS
Flomax (Tamsulosin) Information       Please take one capsule (0 4 mg) by mouth ~30 minutes after the same mealtime each day  Potential side effects include but are not limited to     dizziness/vertigo (when arising from bed at night - please sit on edge of bed for 1-2 minutes before standing as well as sitting down while urinating during the night to prevent falls from dizziness/vertigo from change in position)  Ejaculation issues (some men may experience retrograde ejaculation meaning that there may be a lesser amount or no ejaculate at time of orgasm because the ejaculate is retrograded to the bladder to eventually be urinated out  This is not dangerous but can be bothersome to some men   Please reach out if you experience this and are bothered by it)  Headache   Nasal congestion     Discontinue and go to an emergency room if you develop chest pain/if your pre-established chest pain worsens     If planning cataract surgery, please inform your eye doctor that you taken Flomax in the past (even if you stopped it at time of cataract surgery)    If you have any questions or concerns, please do not hesitate to reach out through [employer] or by calling the office

## 2023-05-25 ENCOUNTER — PREP FOR PROCEDURE (OUTPATIENT)
Dept: UROLOGY | Facility: AMBULATORY SURGERY CENTER | Age: 63
End: 2023-05-25

## 2023-05-25 ENCOUNTER — TELEPHONE (OUTPATIENT)
Dept: UROLOGY | Facility: AMBULATORY SURGERY CENTER | Age: 63
End: 2023-05-25

## 2023-05-25 DIAGNOSIS — R39.89 SUSPECTED UTI: ICD-10-CM

## 2023-05-25 DIAGNOSIS — R31.0 GROSS HEMATURIA: Primary | ICD-10-CM

## 2023-05-25 DIAGNOSIS — Z01.812 PRE-OPERATIVE LABORATORY EXAMINATION: ICD-10-CM

## 2023-05-25 NOTE — TELEPHONE ENCOUNTER
Patient called in for SS  Was transferred to [facility]

## 2023-05-25 NOTE — TELEPHONE ENCOUNTER
Call transferred to me from [facility] with patient and confirmed 7/19 for his cystoscopy (w/o anesthesia) with Dr Levi Prior @ [facility]  No prep needed besides a urine culture 2 weeks prior  Lab ordered and mailed in packet  He can drive himself since he will not be under anesthesia  He is aware the hospital will call the day prior  No signed consent obtained     Info mailed 5/25 Patient/Caregiver provided printed discharge information.

## 2023-05-25 NOTE — TELEPHONE ENCOUNTER
1st voicemail left to schedule cystoscopy (w/o anesthesia) that Yanira Salazar ordered  Office number left

## 2023-05-26 ENCOUNTER — APPOINTMENT (OUTPATIENT)
Dept: LAB | Facility: CLINIC | Age: 63
End: 2023-05-26

## 2023-05-26 DIAGNOSIS — Z12.5 PROSTATE CANCER SCREENING: ICD-10-CM

## 2023-05-26 LAB — PSA SERPL-MCNC: 0.9 NG/ML (ref 0–4)

## 2023-05-31 ENCOUNTER — HOSPITAL ENCOUNTER (OUTPATIENT)
Dept: RADIOLOGY | Facility: HOSPITAL | Age: 63
Discharge: HOME | End: 2023-05-31

## 2023-05-31 ENCOUNTER — TELEPHONE (OUTPATIENT)
Dept: OTHER | Facility: OTHER | Age: 63
End: 2023-05-31

## 2023-05-31 DIAGNOSIS — R31.0 GROSS HEMATURIA: ICD-10-CM

## 2023-05-31 RX ADMIN — IOHEXOL 100 ML: 350 INJECTION, SOLUTION INTRAVENOUS at 08:20

## 2023-05-31 NOTE — TELEPHONE ENCOUNTER
Called patient ramírez to call back regarding the message below    If he calls back please advise him maría will be in contact with him unless he wants to call central scheduling himself to schedule [phone]

## 2023-05-31 NOTE — TELEPHONE ENCOUNTER
Pt  Is looking at his after summary visit and it looks like he is suppose to have a CT but no one mentioned it or scheduled it  Pt would like a return call to inquire about this

## 2023-05-31 NOTE — TELEPHONE ENCOUNTER
Patient called stating he had his ct scan done today and he was confirming his procedure date of 07/19/23 at [place]  He is aware the hospital will call him the day before to let him know what time to report

## 2023-06-02 NOTE — TELEPHONE ENCOUNTER
Thanks  Will review Ct urogram when images/reports are finalized  Will mostly likely get results next week   Keep 7/19 for OR cystoscopy with VR

## 2023-06-06 NOTE — TELEPHONE ENCOUNTER
Please see result management note under CT  was already task to [facility] clinical pool but please ensure that patient was notified about results

## 2023-06-23 ENCOUNTER — OFFICE VISIT (OUTPATIENT)
Dept: UROLOGY | Facility: CLINIC | Age: 63
End: 2023-06-23

## 2023-06-23 VITALS
SYSTOLIC BLOOD PRESSURE: 120 MMHG | HEART RATE: 77 BPM | OXYGEN SATURATION: 97 % | RESPIRATION RATE: 16 BRPM | HEIGHT: 69 IN | WEIGHT: 214.4 LBS | DIASTOLIC BLOOD PRESSURE: 70 MMHG | BODY MASS INDEX: 31.76 KG/M2

## 2023-06-23 DIAGNOSIS — N20.0 NEPHROLITHIASIS: Primary | ICD-10-CM

## 2023-06-23 DIAGNOSIS — N40.1 BPH WITH OBSTRUCTION/LOWER URINARY TRACT SYMPTOMS: ICD-10-CM

## 2023-06-23 DIAGNOSIS — N13.8 BPH WITH OBSTRUCTION/LOWER URINARY TRACT SYMPTOMS: ICD-10-CM

## 2023-06-23 NOTE — PROGRESS NOTES
Office Visit- Urology  Adonis Pelaez 1960 MRN: [id number]      Assessment/Discussion/Plan    63 y o  male managed by     1  Gross hematuria  -See HPI below  -No smoking history, occupational exposure to potentially carcinogenic chemicals, or family history  -CT renal protocol demonstrated a nonobstructing 4 mm distal right ureteral calculus  It is likely that patient's episode of gross hematuria is related to stone but for sake of thoroughness we will plan to complete cystoscopy for full hematuria work-up  -Proceed with cystoscopy  Consent to be signed the day of     2  Prostate cancer screening  -No known family history  -JAMES today's visit not concerning for prostate cancer  -PSA returned at 0 9  -Yearly PSA with JAMES     3  BPH with LUTS  -PVR 18 ml   -Patient bothered by his current urinary symptoms  -Discussed pharmacotherapy  Patient would like to trial Flomax 0 4mg  Patient has not seen significant improvement in symptomatology  AUA symptom score at 10  Continue with Flomax for additional couple weeks insult, cystoscopy  Patient likely not to be candidate for Cialis due to cardiac history and prescription of nitroglycerin  Finasteride likely not indicated  If Flomax ultimately does not work would trial either anticholinergic or beta 3 agonist     4  Nephrolithiasis  -Numerous bilateral stones new over past 3 years  -Patient interested in metabolic work-up to assess for any medical reason why he is making kidney stones  -He is asymptomatic  -Obtain metabolic work-up    Chief Complaint:   Ricardo Harvey is a 63 y o  male presenting to the office for a follow up visit regarding Cystoscopy, BPH with LUTS, nephrolithiasis        Subjective    -26-year-old male with a past medical history significant for MI in 2020 status post coronary angioplasty with stent placement    On aspirin  -No prior urologic history  -Patient presents the office for evaluation of gross hematuria  -Stated at that last week he had 2 episodes of blood within the urine and his urine was discolored a [employer] color he also notes that there is spots of maroon that he noticed within the toilet bowl  No further episodes of hematuria     -This was patient's first episodes of gross hematuria  -He was evaluated in the ER on 5/13 at which time urine testing did not demonstrate microscopic hematuria nor was her concern for infection  -Patient endorses bothersome urinary symptoms including frequency and urgency as well as nocturia  -No dysuria or flank pain  -Patient denies a smoking history  -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  -No known family history of  malignancy   -patient has a personal history of basal cell carcinoma but no other malignancy  -CT scan from 2020 did not reveal any concerning findings with kidneys or bladder    History in  interim  -CT scan demonstrates multiple small nonobstructing kidney stones in both kidneys measuring 2-4 mm    - 4 mm stone in the distal right ureter that is not blocking his ureter that could very well be the cause of patient's gross hematuria   -No known stone passage  -Patient remains asymptomatic  -No further episodes of gross hematuria      AUA SYMPTOM SCORE    Flowsheet Row Most Recent Value   AUA SYMPTOM SCORE    How often have you had a sensation of not emptying your bladder completely after you finished urinating? 1 (P)     How often have you had to urinate again less than two hours after you finished urinating? 1 (P)     How often have you found you stopped and started again several times when you urinate? 1 (P)     How often have you found it difficult to postpone urination? 1 (P)     How often have you had a weak urinary stream? 1 (P)     How often have you had to push or strain to begin urination? 0 (P)     How many times did you most typically get up to urinate from the time you went to bed at night until the time you got up in the morning? 5 (P)     Quality of Life: If you were to spend the rest of your life with your urinary condition just the way it is now, how would you feel about that? 2 (P)     AUA SYMPTOM SCORE 10 (P)           ROS:   Review of Systems   Constitutional: Negative  Negative for chills, fatigue and fever  HENT: Negative  Respiratory: Negative for shortness of breath  Cardiovascular: Negative for chest pain  Gastrointestinal: Negative  Negative for abdominal pain  Endocrine: Negative  Musculoskeletal: Negative  Skin: Negative  Neurological: Negative  Negative for dizziness and light-headedness  Hematological: Negative  Psychiatric/Behavioral: Negative            Past Medical History  Past Medical History:   Diagnosis Date   • Cancer ([facility] )    • Dry eyes    • Heart attack ([facility] ) 2020 1 stent   • Spinal stenosis        Past Surgical History  Past Surgical History:   Procedure Laterality Date   • CORONARY ANGIOPLASTY WITH STENT PLACEMENT     • HERNIA REPAIR     • ROTATOR CUFF REPAIR         Past Family History  Family History   Problem Relation Age of Onset   • Dementia Mother    • Diabetes Father    • Peripheral vascular disease Father    • Diabetes Sister    • Diabetes Brother        Past Social history  Social History     Socioeconomic History   • Marital status:      Spouse name: Not on file   • Number of children: Not on file   • Years of education: Not on file   • Highest education level: Not on file   Occupational History   • Not on file   Tobacco Use   • Smoking status: Never   • Smokeless tobacco: Never   Vaping Use   • Vaping Use: Never used   Substance and Sexual Activity   • Alcohol use: Yes     Comment: socially    • Drug use: Yes     Types: Marijuana     Comment: Gummies to help him sleep   • Sexual activity: Not on file   Other Topics Concern   • Not on file   Social History Narrative   • Not on file     Social Determinants of Health     Financial Resource Strain: Not on file   Food Insecurity: Not on "file   Transportation Needs: Not on file   Physical Activity: Not on file   Stress: Not on file   Social Connections: Not on file   Intimate Partner Violence: Not on file   Housing Stability: Not on file       Current Medications  Current Outpatient Medications   Medication Sig Dispense Refill   • aspirin 81 mg chewable tablet Chew 2 tablets (162 mg total) daily 30 tablet 0   • atorvastatin (LIPITOR) 80 mg tablet TAKE 1 TABLET BY MOUTH EVERY DAY WITH DINNER 90 tablet 3   • metoprolol tartrate (LOPRESSOR) 25 mg tablet TAKE 1 TABLET (25 MG TOTAL) BY MOUTH EVERY 12 (TWELVE) HOURS 180 tablet 3   • nitroglycerin (NITROSTAT) 0 4 mg SL tablet Place 1 tablet (0 4 mg total) under the tongue every 5 (five) minutes as needed for chest pain 60 tablet 0   • tamsulosin (FLOMAX) 0 4 mg Take 1 capsule (0 4 mg total) by mouth daily with dinner 30 capsule 1   • Xiidra 5 % op solution PLACE 1 DROP INTO EACH EYE EVERY 12 HOURS       No current facility-administered medications for this visit  Allergies  No Known Allergies    OBJECTIVE    Vitals   Vitals:    06/23/23 1324   BP: 120/70   BP Location: Right arm   Patient Position: Sitting   Cuff Size: Large   Pulse: 77   Resp: 16   SpO2: 97%   Weight: 97 3 kg (214 lb 6 4 oz)   Height: 5' 9\" (1 753 m)       PVR:    Physical Exam  Constitutional:       General: He is not in acute distress  Appearance: Normal appearance  He is normal weight  He is not ill-appearing or toxic-appearing  HENT:      Head: Normocephalic and atraumatic  Eyes:      Conjunctiva/sclera: Conjunctivae normal    Cardiovascular:      Rate and Rhythm: Normal rate  Pulmonary:      Effort: Pulmonary effort is normal  No respiratory distress  Abdominal:      Tenderness: There is no right CVA tenderness or left CVA tenderness  Musculoskeletal:         General: Normal range of motion  Cervical back: Normal range of motion and neck supple  Skin:     General: Skin is warm and dry     Neurological:      " General: No focal deficit present  Mental Status: He is alert and oriented to person, place, and time  Cranial Nerves: No cranial nerve deficit  Psychiatric:         Mood and Affect: Mood normal          Behavior: Behavior normal          Thought Content: Thought content normal           Labs:     Lab Results   Component Value Date    PSA 0 90 05/26/2023     Lab Results   Component Value Date    CREATININE 0 99 05/17/2023      Lab Results   Component Value Date    HGBA1C 5 7 (H) 11/22/2020     Lab Results   Component Value Date    CALCIUM 8 9 05/17/2023    K 5 0 05/17/2023    CO2 28 05/17/2023     (H) 05/17/2023    BUN 13 05/17/2023    CREATININE 0 99 05/17/2023       I have personally reviewed all pertinent lab results and reviewed with patient    Imaging     CT ABDOMEN AND PELVIS WITH AND WITHOUT IV CONTRAST     INDICATION:   R31 0: Gross hematuria      COMPARISON: CT dated 11/22/2020     TECHNIQUE: Initial CT of the abdomen and pelvis was performed without intravenous contrast   Subsequent dynamic CT evaluation of the abdomen and pelvis was performed after the administration of intravenous contrast in both nephrographic and delayed   phases  Multiplanar 2D reformatted images were created from the source data      This examination, like all CT scans performed in the [facility], was performed utilizing techniques to minimize radiation dose exposure, including the use of iterative reconstruction and automated exposure control  Radiation dose length   product (DLP) for this visit:  2348  98 mGy-cm     IV Contrast:  100 mL of iohexol (OMNIPAQUE)  Enteric Contrast:  Enteric contrast was not administered      FINDINGS:     ABDOMEN     RIGHT KIDNEY AND URETER:  No solid renal mass  No detectable urothelial mass  No hydronephrosis or hydroureter  There are 2 punctate nonobstructive renal calculi measuring 2 to 3 mm in size    There is a nonobstructing 4 mm distal right ureteral calculus (series 2, its #140)     LEFT KIDNEY AND URETER:  No solid renal mass  No detectable urothelial mass  No hydronephrosis or hydroureter  There are 3 nonobstructive renal calculi measuring 2mm to 4 mm in size      URINARY BLADDER:  No bladder wall mass  No calculi         LOWER CHEST:  No clinically significant abnormality identified in the visualized lower chest      LIVER/BILIARY TREE: There is geographic hypoattenuation of the right hepatic lobe relative to the left hepatic lobe; this was also seen on the prior study from November 2020  This is presumably related to asymmetric fatty infiltration of the liver more   in the right lobe as compared to the left lobe      GALLBLADDER:  No calcified gallstones  No pericholecystic inflammatory change      SPLEEN:  Unremarkable      PANCREAS:  Unremarkable      ADRENAL GLANDS:  Unremarkable      STOMACH AND BOWEL:  Unremarkable      APPENDIX:  No findings to suggest appendicitis      ABDOMINOPELVIC CAVITY:  No ascites  No free intraperitoneal air  No lymphadenopathy      VESSELS:  Unremarkable for patient's age      PELVIS     REPRODUCTIVE ORGANS:  Unremarkable for patient's age      ABDOMINAL WALL/INGUINAL REGIONS:  There is a small fat-containing umbilical hernia, unchanged from previous examination      Stable left fat-containing inguinal hernia      OSSEOUS STRUCTURES: Degenerative changes of the spine      IMPRESSION:     Bilateral punctate nonobstructive renal calculi  4 mm right distal ureteral nonobstructive calculus         Workstation performed: [id number]    Nargis Almodovar PA-C  Date: 6/23/2023 Time: 1:33 PM  [facility]    This note was written using fluency dictation software  Please excuse any resulting minor grammatical errors

## 2023-06-27 RX ORDER — TAMSULOSIN HYDROCHLORIDE 0.4 MG/1
0.4 CAPSULE ORAL
Qty: 30 CAPSULE | Refills: 1 | Status: SHIPPED | OUTPATIENT
Start: 2023-06-27

## 2023-07-03 ENCOUNTER — APPOINTMENT (OUTPATIENT)
Dept: LAB | Facility: CLINIC | Age: 63
End: 2023-07-03
Payer: COMMERCIAL

## 2023-07-03 DIAGNOSIS — N20.0 NEPHROLITHIASIS: ICD-10-CM

## 2023-07-03 PROCEDURE — 82507 ASSAY OF CITRATE: CPT

## 2023-07-03 PROCEDURE — 83735 ASSAY OF MAGNESIUM: CPT

## 2023-07-03 PROCEDURE — 84300 ASSAY OF URINE SODIUM: CPT

## 2023-07-03 PROCEDURE — 82570 ASSAY OF URINE CREATININE: CPT

## 2023-07-03 PROCEDURE — 84100 ASSAY OF PHOSPHORUS: CPT

## 2023-07-03 PROCEDURE — 82436 ASSAY OF URINE CHLORIDE: CPT

## 2023-07-03 PROCEDURE — 82131 AMINO ACIDS SINGLE QUANT: CPT

## 2023-07-03 PROCEDURE — 82340 ASSAY OF CALCIUM IN URINE: CPT

## 2023-07-03 PROCEDURE — 81003 URINALYSIS AUTO W/O SCOPE: CPT

## 2023-07-03 PROCEDURE — 83945 ASSAY OF OXALATE: CPT

## 2023-07-03 PROCEDURE — 84550 ASSAY OF BLOOD/URIC ACID: CPT

## 2023-07-03 PROCEDURE — 36415 COLL VENOUS BLD VENIPUNCTURE: CPT

## 2023-07-03 PROCEDURE — 83935 ASSAY OF URINE OSMOLALITY: CPT

## 2023-07-03 PROCEDURE — 84560 ASSAY OF URINE/URIC ACID: CPT

## 2023-07-03 PROCEDURE — 82140 ASSAY OF AMMONIA: CPT

## 2023-07-03 PROCEDURE — 84133 ASSAY OF URINE POTASSIUM: CPT

## 2023-07-03 PROCEDURE — 84392 ASSAY OF URINE SULFATE: CPT

## 2023-07-03 PROCEDURE — 80048 BASIC METABOLIC PNL TOTAL CA: CPT

## 2023-07-03 PROCEDURE — 83970 ASSAY OF PARATHORMONE: CPT

## 2023-07-03 PROCEDURE — 84105 ASSAY OF URINE PHOSPHORUS: CPT

## 2023-07-04 LAB
ANION GAP SERPL CALCULATED.3IONS-SCNC: 2 MMOL/L
BUN SERPL-MCNC: 14 MG/DL (ref 5–25)
CALCIUM SERPL-MCNC: 9.4 MG/DL (ref 8.3–10.1)
CHLORIDE SERPL-SCNC: 110 MMOL/L (ref 96–108)
CO2 SERPL-SCNC: 26 MMOL/L (ref 21–32)
CREAT SERPL-MCNC: 1.22 MG/DL (ref 0.6–1.3)
GFR SERPL CREATININE-BSD FRML MDRD: 64 ML/MIN/1.73SQ M
GLUCOSE P FAST SERPL-MCNC: 117 MG/DL (ref 65–99)
MAGNESIUM SERPL-MCNC: 2.4 MG/DL (ref 1.6–2.6)
PHOSPHATE SERPL-MCNC: 3.3 MG/DL (ref 2.3–4.1)
POTASSIUM SERPL-SCNC: 5.1 MMOL/L (ref 3.5–5.3)
PTH-INTACT SERPL-MCNC: 61.5 PG/ML (ref 12–88)
SODIUM SERPL-SCNC: 138 MMOL/L (ref 135–147)
URATE SERPL-MCNC: 7.1 MG/DL (ref 3.5–8.5)

## 2023-07-05 ENCOUNTER — APPOINTMENT (OUTPATIENT)
Dept: LAB | Facility: CLINIC | Age: 63
End: 2023-07-05
Payer: COMMERCIAL

## 2023-07-05 DIAGNOSIS — R39.89 SUSPECTED UTI: ICD-10-CM

## 2023-07-05 DIAGNOSIS — Z01.812 PRE-OPERATIVE LABORATORY EXAMINATION: ICD-10-CM

## 2023-07-05 DIAGNOSIS — R31.0 GROSS HEMATURIA: ICD-10-CM

## 2023-07-05 PROCEDURE — 87086 URINE CULTURE/COLONY COUNT: CPT

## 2023-07-06 LAB — BACTERIA UR CULT: NORMAL

## 2023-07-12 NOTE — PRE-PROCEDURE INSTRUCTIONS
Pre-Surgery Instructions:   Medication Instructions   • aspirin 81 mg chewable tablet Instructions provided by MD   • atorvastatin (LIPITOR) 80 mg tablet Take night before surgery   • metoprolol tartrate (LOPRESSOR) 25 mg tablet Take day of surgery. • nitroglycerin (NITROSTAT) 0.4 mg SL tablet Uses PRN- OK to take day of surgery   • tamsulosin (FLOMAX) 0.4 mg Take night before surgery   • Xiidra 5 % op solution Take day of surgery. Medication instructions for day surgery reviewed. Please use only a sip of water to take your instructed medications. Avoid all over the counter vitamins, supplements and NSAIDS for one week prior to surgery per anesthesia guidelines. Tylenol is ok to take as needed. Pt to follow Dr. Nusrat Tomlin instructions. Pt to arrive to the [facility] [place] at the given time and proceed to the SDS unit 2nd floor. You will receive a call one business day prior to surgery with an arrival time and hospital directions. If your surgery is scheduled on a Monday, the hospital will be calling you on the Friday prior to your surgery. If you have not heard from anyone by 8pm, please call the hospital supervisor through the hospital  at [phone]. [facility] [phone]). Do not eat or drink anything after midnight the night before your surgery, including candy, mints, lifesavers, or chewing gum. Do not drink alcohol 24hrs before your surgery. Try not to smoke at least 24hrs before your surgery. Follow the pre surgery showering instructions as listed in the [facility] Surgical Experience Booklet” or otherwise provided by your surgeon's office. Do not shave the surgical area 24 hours before surgery. Do not apply any lotions, creams, including makeup, cologne, deodorant, or perfumes after showering on the day of your surgery. No contact lenses, eye make-up, or artificial eyelashes. Remove nail polish, including gel polish, and any artificial, gel, or acrylic nails if possible.  Remove all jewelry including rings and body piercing jewelry. Wear causal clothing that is easy to take on and off. Consider your type of surgery. Keep any valuables, jewelry, piercings at home. Please bring any specially ordered equipment (sling, braces) if indicated. Arrange for a responsible person to drive you to and from the hospital on the day of your surgery. Visitor Guidelines discussed. Call the surgeon's office with any new illnesses, exposures, or additional questions prior to surgery. Please reference your [facility] Surgical Experience Booklet” for additional information to prepare for your upcoming surgery.

## 2023-07-13 ENCOUNTER — OFFICE VISIT (OUTPATIENT)
Dept: CARDIOLOGY CLINIC | Facility: CLINIC | Age: 63
End: 2023-07-13
Payer: COMMERCIAL

## 2023-07-13 VITALS
OXYGEN SATURATION: 99 % | HEART RATE: 61 BPM | BODY MASS INDEX: 31.55 KG/M2 | DIASTOLIC BLOOD PRESSURE: 80 MMHG | WEIGHT: 213 LBS | HEIGHT: 69 IN | SYSTOLIC BLOOD PRESSURE: 120 MMHG

## 2023-07-13 DIAGNOSIS — I10 ESSENTIAL HYPERTENSION: ICD-10-CM

## 2023-07-13 DIAGNOSIS — E78.2 MIXED HYPERLIPIDEMIA: ICD-10-CM

## 2023-07-13 DIAGNOSIS — Z87.442 HISTORY OF KIDNEY STONES: ICD-10-CM

## 2023-07-13 DIAGNOSIS — I25.10 CORONARY ARTERY DISEASE INVOLVING NATIVE CORONARY ARTERY OF NATIVE HEART WITHOUT ANGINA PECTORIS: ICD-10-CM

## 2023-07-13 DIAGNOSIS — E66.9 OBESITY (BMI 30-39.9): ICD-10-CM

## 2023-07-13 DIAGNOSIS — Z98.61 HISTORY OF PERCUTANEOUS CORONARY INTERVENTION: ICD-10-CM

## 2023-07-13 LAB
AMMONIA 24H UR-MRATE: 17 MEQ/24 HR
AMMONIA UR-SCNC: ABNORMAL UG/DL
CA H2 PHOS DIHYD CRY URNS QL MICRO: 2.55 RATIO (ref 0–3)
CALCIUM 24H UR-MCNC: 13.6 MG/DL
CALCIUM 24H UR-MRATE: 142.8 MG/24 HR (ref 0–320)
CHLORIDE 24H UR-SCNC: 54 MMOL/L
CHLORIDE 24H UR-SRATE: 57 MMOL/24 HR (ref 52–264)
CITRATE 24H UR-MCNC: 704 MG/L
CITRATE 24H UR-MRATE: 739 MG/24 HR (ref 320–1240)
COM CRY STONE QL IR: 2.97 RATIO (ref 0–6)
CREAT 24H UR-MCNC: 123.1 MG/DL
CREAT 24H UR-MRATE: 1292.6 MG/24 HR (ref 1000–2000)
CYSTINE 24H UR-MCNC: 14.54 MG/L
CYSTINE 24H UR-MRATE: 15.27 MG/24 HR (ref 2.1–58)
MAGNESIUM 24H UR-MRATE: 65 MG/24 HR (ref 12–293)
MAGNESIUM UR-MCNC: 6.2 MG/DL
NA URATE CRY STONE QL IR: 5.45 RATIO (ref 0–4)
OSMOLALITY UR: 576 MOSMOL/KG (ref 300–900)
OXALATE 24H UR-MRATE: 13 MG/24 HR (ref 7–44)
OXALATE UR-MCNC: 12 MG/L
PH 24H UR: 6.6 [PH] (ref 4.5–8)
PHOSPHATE 24H UR-MCNC: 60.1 MG/DL
PHOSPHATE 24H UR-MRATE: 631.1 MG/24 HR (ref 390–1425)
PLEASE NOTE (STONE RISK): ABNORMAL
POTASSIUM 24H UR-SCNC: 65.9 MMOL/24 HR (ref 20–116)
POTASSIUM UR-SCNC: 62.8 MMOL/L
PRESERVED URINE: 1050 ML/24 HR (ref 800–1800)
SODIUM 24H UR-SCNC: 74 MMOL/L
SODIUM 24H UR-SRATE: 78 MMOL/24 HR (ref 58–337)
SPECIMEN VOL 24H UR: 1050 ML/24 HR (ref 800–1800)
SULFATE 24H UR-MCNC: 28 MEQ/24 HR (ref 0–30)
SULFATE UR-MCNC: 27 MEQ/L
TRI-PHOS CRY STONE MICRO: 0.09 RATIO (ref 0–1)
URATE 24H UR-MCNC: 63.9 MG/DL
URATE 24H UR-MRATE: 671 MG/24 HR (ref 197–1079)
URATE DIHYD CRY STONE QL IR: 0.75 RATIO (ref 0–1.2)

## 2023-07-13 PROCEDURE — 99214 OFFICE O/P EST MOD 30 MIN: CPT | Performed by: INTERNAL MEDICINE

## 2023-07-13 PROCEDURE — 93000 ELECTROCARDIOGRAM COMPLETE: CPT | Performed by: INTERNAL MEDICINE

## 2023-07-13 NOTE — PROGRESS NOTES
Progress Note - Cardiology Office  [facility]    Bin Carpio 63 y.o. male MRN: [id number]  : 1960  Encounter: [id number]      Assessment:     1. Coronary artery disease involving native coronary artery of native heart without angina pectoris    2. Essential hypertension    3. Mixed hyperlipidemia    4. History of percutaneous coronary intervention    5. Obesity (BMI 30-39.9)    6. History of kidney stones        Discussion Summary and Plan:  1. Coronary artery disease status post non ST elevation MI status post stenting of LCX with drug coated stent. All issues related to drug coated stent discusse discussed with patient. He has completed dual platelet therapy for more than 18 months. Continue aspirin. No change in symptoms. 2. Dyslipidemia. Continue high intensity statin cholesterol profile done in May 2023 is acceptable. LFTs are stable. 3. Essential hypertension. Patient blood pressure is pretty well controlled with low-dose metoprolol. Heart rate is acceptable. 4. Obesity with BMI around 31. He has gained some weight since last visit. That could cause some with exertion shortness of breath advised him to lose weight     5. History of hematuria and kidney stone. Mesh management as per urology. EKG shows no change from previous EKG previous cath report reviewed. We will cardiovascular status follow-up 6 months. All questions related to coronary artery disease, need for blood thinner, statins, monitoring liver enzyme all discussed with patient all their questions answered to satisfaction. Follow-up 6 months. Please call [phone] if any questions. Counseling :  A description of the counseling. Goals and Barriers. Patient's ability to self care: Yes  Medication side effect reviewed with patient in detail and all their questions answered to their satisfaction.     HPI :     Bin Carpio is a 63y.o. year old male who came for follow up. Patient hasMedical history significant for hypertension dyslipidemia was recently admitted to [facility] with chest pain and underwent cardiac catheterization found to have significant stenosis of mid circumflex. Which was successfully stented with drug-eluting stent. He came for follow-up he is doing well denies any chest pain any shortness of breath no nausea no vomiting no fever no chills no PND no orthopnea no other issues at this time. 06/14/2022. Above reviewed. Patient came for follow-up. He is doing well he had history of coronary artery disease status post non ST elevation MI status post stenting of mid circumflex with drug-eluting stent. He has been doing well since then. His vitals has been stable. His last blood test was in May 2021 which has been acceptable. His angioplasty was done in November 2020. Used to exercise lately he stopped he try to do it felt some exertional shortness of breath but no chest pain no other issues at this time. 12/21/2022. Have reviewed. Patient came for follow-up he is doing well. History of coronary artery disease s/p non-ST elevation MI s/p stenting of mid circumflex with a drug-eluting stent. He is doing well since then. No nausea no vomiting no fever no chills no other cardiovascular issues. Angioplasty was done in November 2020. He has blood test done July 2022 which has been acceptable. He does exercise but he thinks he can do more. No other issues at this time. 7/13/2023. Above reviewed. Patient came for follow-up he is doing well from cardiac point of view but recently had hematuria and was found to have kidney stone now scheduled for cystoscopy. Cardiac medical history significant for coronary artery disease s/p non-ST elevation MI s/p stenting of mid circumflex. He is doing well since then. He is on aspirin. And high intensity statins.   EKG shows sinus rhythm with RSR pattern/incomplete RBBB and Q waves inferior lead most likely pseudo infarct pattern. No nausea no vomiting no other cardiovascular issues. Recently he was started on Flomax. He has blood test done in May 2023 cholesterol profile and electrolytes were stable. No other issues vitals are stable today. Review of Systems   Constitutional: Negative for activity change, chills, diaphoresis, fever and unexpected weight change. HENT: Negative for congestion. Eyes: Negative for discharge and redness. Respiratory: Negative for cough, chest tightness, shortness of breath and wheezing. Cardiovascular: Negative. Negative for chest pain, palpitations and leg swelling. Gastrointestinal: Negative for abdominal pain, diarrhea and nausea. Endocrine: Negative. Genitourinary: Negative for decreased urine volume and urgency. History of kidney stone and hematuria now scheduled for cystoscope   Musculoskeletal: Negative. Negative for arthralgias, back pain and gait problem. Skin: Negative for rash and wound. Allergic/Immunologic: Negative. Neurological: Negative for dizziness, seizures, syncope, weakness, light-headedness and headaches. Hematological: Negative. Psychiatric/Behavioral: Negative for agitation and confusion. The patient is nervous/anxious.         Historical Information   Past Medical History:   Diagnosis Date   • Cancer ([address])     basal cell- facial cheek, chest   • Decreased hearing     bilateral   • Dry eyes    • Heart attack ([address]) 2020    1 stent   • Hematuria    • Kidney stone    • Myocardial infarction ([address])     2020-one stent   • Spinal stenosis    • Wears glasses      Past Surgical History:   Procedure Laterality Date   • COLONOSCOPY     • CORONARY ANGIOPLASTY WITH STENT PLACEMENT  2020   • HERNIA REPAIR      umbilical, inguinal   • LIPOMA RESECTION      right shoulder     Social History     Substance and Sexual Activity   Alcohol Use Yes    Comment: socially      Social History     Substance and Sexual Activity   Drug Use Yes   • Types: Marijuana    Comment: Gummies to help him sleep     Social History     Tobacco Use   Smoking Status Never   Smokeless Tobacco Never     Family History:   Family History   Problem Relation Age of Onset   • Dementia Mother    • Diabetes Father    • Peripheral vascular disease Father    • Diabetes Sister    • Diabetes Brother        Meds/Allergies     No Known Allergies    Current Outpatient Medications:   •  aspirin 81 mg chewable tablet, Chew 2 tablets (162 mg total) daily, Disp: 30 tablet, Rfl: 0  •  atorvastatin (LIPITOR) 80 mg tablet, TAKE 1 TABLET BY MOUTH EVERY DAY WITH DINNER, Disp: 90 tablet, Rfl: 3  •  metoprolol tartrate (LOPRESSOR) 25 mg tablet, TAKE 1 TABLET (25 MG TOTAL) BY MOUTH EVERY 12 (TWELVE) HOURS, Disp: 180 tablet, Rfl: 3  •  nitroglycerin (NITROSTAT) 0.4 mg SL tablet, Place 1 tablet (0.4 mg total) under the tongue every 5 (five) minutes as needed for chest pain, Disp: 60 tablet, Rfl: 0  •  tamsulosin (FLOMAX) 0.4 mg, Take 1 capsule (0.4 mg total) by mouth daily with dinner, Disp: 30 capsule, Rfl: 1  •  Xiidra 5 % op solution, PLACE 1 DROP INTO EACH EYE EVERY 12 HOURS, Disp: , Rfl:     Vitals: Blood pressure 120/80, pulse 61, height 5' 9" (1.753 m), weight 96.6 kg (213 lb), SpO2 99 %. ?  Body mass index is 31.45 kg/m². Vitals:    07/13/23 1257   Weight: 96.6 kg (213 lb)     BP Readings from Last 3 Encounters:   07/13/23 120/80   06/23/23 120/70   05/24/23 128/78       Physical Exam  Constitutional:       General: He is not in acute distress. Appearance: He is well-developed. He is not diaphoretic. Neck:      Thyroid: No thyromegaly. Vascular: No JVD. Trachea: No tracheal deviation. Cardiovascular:      Rate and Rhythm: Normal rate and regular rhythm. Heart sounds: S1 normal and S2 normal. Heart sounds not distant. Murmur heard. Systolic (ejection) murmur is present with a grade of 2/6. No friction rub. No gallop.  No S3 or S4 sounds. Pulmonary:      Effort: Pulmonary effort is normal. No respiratory distress. Breath sounds: Normal breath sounds. No wheezing or rales. Chest:      Chest wall: No tenderness. Abdominal:      General: Bowel sounds are normal. There is no distension. Palpations: Abdomen is soft. Tenderness: There is no abdominal tenderness. Musculoskeletal:         General: No deformity. Cervical back: Neck supple. Skin:     General: Skin is warm and dry. Coloration: Skin is not pale. Findings: No rash. Neurological:      Mental Status: He is alert and oriented to person, place, and time. Psychiatric:         Behavior: Behavior normal.         Judgment: Judgment normal.           Diagnostic Studies Review Cardio:    Cardiac catheterization reviewed. EKG:    Twelve lead EKG done 12/01/2020 shows normal sinus rhythm left axis deviation. Heart rate 84 beats per minute no significant changes. Twelve lead EKG 06/02/2021 shows normal sinus rhythm incomplete RBBB heart rate 66 beats per minute. No change from previous EKG    Lead EKG done 12/14/2021 shows normal sinus rhythm incomplete RBBB heart rate 61 beats per minute. Small Q-waves noted in inferior leads most likely pseudo infarct pattern no change from previous EKG. Twelve lead EKG done on 06/14/2022 shows normal sinus rhythm incomplete RBBB heart rate 67 beats per minute no change from previous EKG.     Twelve-lead EKG 12/21/2022 shows normal sinus rhythm heart rate 80 bpm.  Incomplete RBBB no change from previous EKG    Twelve-lead EKG done on 7/13/2023 shows normal sinus some heart rate 61 bpm Q inferior lead most like pseudoinfarction pattern include RBBB no change from previous EKG    Cardiac testing:   Results for orders placed during the hospital encounter of 11/22/20   Echo complete with contrast if indicated    Narrative [address] [facility]  [address] [facility]  [address] [facility] [address]  [phone]    Transthoracic Echocardiogram  2D, M-mode, Doppler, and Color Doppler    Study date:  2020    Patient: Osvaldo JAUREGUI  MR number: [id number]  Account number: [de-identified]  : 1960  Age: 64 years  Gender: Male  Status: Outpatient  Location: Bedside  Height: 69 in  Weight: 209.7 lb  BP: 128/ 74 mmHg    Indications: Heart Failure    Diagnoses: I50.9 - Heart failure, unspecified    Sonographer:  CLAY Brody  Referring Physician:  Cherie Rios MD  Group:  [facility]  Interpreting Physician:  Jaclyn Jean MD    SUMMARY    LEFT VENTRICLE:  Systolic function was normal. Ejection fraction was estimated in the range of 60 % to 65 %. There were no regional wall motion abnormalities. MITRAL VALVE:  There was trace regurgitation. HISTORY: PRIOR HISTORY: Spinal Stenosis, HLD, Basal Cell Carcinoma    PROCEDURE: The procedure was performed at the bedside. This was a routine study. The transthoracic approach was used. The study included complete 2D imaging, M-mode, complete spectral Doppler, and color Doppler. The heart rate was 72 bpm,  at the start of the study. Image quality was adequate. LEFT VENTRICLE: Size was normal. Systolic function was normal. Ejection fraction was estimated in the range of 60 % to 65 %. There were no regional wall motion abnormalities. Wall thickness was normal. No evidence of apical thrombus. DOPPLER: Left ventricular diastolic function parameters were normal.    RIGHT VENTRICLE: The size was normal. Systolic function was normal with TAPSE-1.9cm Wall thickness was normal.    LEFT ATRIUM: Size was normal.    RIGHT ATRIUM: Size was normal.    MITRAL VALVE: There was mild thickening. There was normal leaflet separation. DOPPLER: The transmitral velocity was within the normal range. There was no evidence for stenosis. There was trace regurgitation. AORTIC VALVE: The valve was trileaflet.  Leaflets exhibited normal thickness and normal cuspal separation. DOPPLER: Transaortic velocity was within the normal range. There was no evidence for stenosis. There was no significant  regurgitation. TRICUSPID VALVE: The valve structure was normal. There was normal leaflet separation. DOPPLER: The transtricuspid velocity was within the normal range. There was no evidence for stenosis. There was no significant regurgitation. PULMONIC VALVE: Leaflets exhibited normal thickness, no calcification, and normal cuspal separation. DOPPLER: The transpulmonic velocity was within the normal range. There was no significant regurgitation. PERICARDIUM: There was no pericardial effusion. The pericardium was normal in appearance. AORTA: The root exhibited normal size. SYSTEMIC VEINS: IVC: The inferior vena cava was normal in size. SYSTEM MEASUREMENT TABLES    2D  EF (Teich): 66.69 %    PW  MV E/A Ratio: 0.87    [employer] Commission Accredited Echocardiography Laboratory    Prepared and electronically signed by    Mariana Parker MD  Signed [id number] 13:25:07       No results found for this or any previous visit. Results for orders placed during the hospital encounter of 20   Cardiac catheterization    [address]  [phone]    [facility]    Invasive Cardiovascular Lab Complete Report    Patient: Kamaljit JAUREGUI  MR number: [id number]  Account number: [de-identified]  Study date: 2020  Gender: Male  : 1960  Height: 68.9 in  Weight: 206.4 lb  BSA: 2.09 mï¾²    Allergies: NO KNOWN ALLERGIES    Diagnostic Cardiologist:  Alejo Arambula MD  Interventional Cardiologist:  Alejo Arambula MD  Primary Physician:  Manan Rushing MD    SUMMARY    CORONARY CIRCULATION:  Mid circumflex: There was a discrete 95 % stenosis. 1ST LESION INTERVENTIONS:  A drug-eluting stent procedure was performed on the 95 % lesion in the mid circumflex.  Following intervention there was an excellent angiographic appearance with a 0 % residual stenosis. A Xience Torie Rx 2.75 x 18mm drug-eluting stent was placed across the lesion and deployed at a maximum inflation pressure of 12 yong. RECOMMENDATIONS:  Patient management should include statin therapy. Patient management to include dual antiplatelet therapy. INDICATIONS:  --  Coronary artery disease: significant (50% or greater) lesion in a major coronary artery. PROCEDURES PERFORMED    --  Right coronary angiography. --  Inpatient. --  Mod Sedation Same Physician Initial 15min. --  Coronary Catheterization (w/o [facility]). --  Coronary Drug Eluting Stent w/PTCA. --  Intervention on mid circumflex: drug-eluting stent. PROCEDURE: The risks and alternatives of the procedures and conscious sedation were explained to the patient and informed consent was obtained. The patient was brought to the cath lab and placed on the table. The planned puncture sites  were prepped and draped in the usual sterile fashion. --  Right radial artery access. After performing an Sandro's test to verify adequate ulnar artery supply to the hand, the radial site was prepped. The puncture site was infiltrated with local anesthetic. The vessel was accessed using the  modified Seldinger technique, a wire was advanced into the vessel, and a sheath was advanced over the wire into the vessel. --  Right coronary artery angiography. A catheter was advanced over a guidewire into the aorta and positioned in the right coronary artery ostium under fluoroscopic guidance. Angiography was performed. --  Inpatient. --  Mod Sedation Same Physician Initial 15min. --  Coronary Catheterization (w/o [facility]). LESION INTERVENTION: A drug-eluting stent procedure was performed on the 95 % lesion in the mid circumflex. Following intervention there was an excellent angiographic appearance with a 0 % residual stenosis. This was a bifurcation lesion.   This was an ACC/AHA type A "low risk" lesion for intervention. There was no evidence of the transient no-reflow phenomenon. There was KEO 3 flow before the procedure and KEO 3 flow after the procedure. --  A Xience Torie Rx 2.75 x 18mm drug-eluting stent was placed across the lesion and deployed at a maximum inflation pressure of 12 yong. INTERVENTIONS:  --  Coronary Drug Eluting Stent w/PTCA. PROCEDURE COMPLETION: The patient tolerated the procedure well and was discharged from the cath lab. TIMING: Test started at 08:52. Test concluded at 09:10. HEMOSTASIS: The sheath was removed. The site was compressed with a Hemoband  device. Hemostasis was obtained. MEDICATIONS GIVEN: 1% Lidocaine, 1 ml, subcutaneously, at 08:54. Baby Aspirin, 81 mg, PO, at 08:54. Effient, 10 mg, PO, at 08:54. Verapamil (5mg/2ml), 2.5 mg, IV, at 08:57. Heparin 1000 units/ml, 7,000  units, IV, at 08:57. Nitroglycerin (200mcg/ml), 200 mcg, at 08:58. Fentanyl (1OOmcg/2 ml), 50 mcg, IV, at 09:10. Versed (2mg/2ml), 2 mg, IV, at 09:10. 0.9 NSS, infusion rate of 75 ml/hr, IV, at 09:10.    CORONARY VESSELS:   --  Circumflex: The vessel was large sized. Angiography showed mild atherosclerosis. --  Mid circumflex: The vessel was large sized. There was a discrete 95 % stenosis. RECOMMENDATIONS  Patient management should include statin therapy. Patient management to include dual antiplatelet therapy. DISPOSITION:  The patient left the catheterization laboratory in stable condition. Prepared and signed by    William Teran MD  Signed 11/24/2020 09:20:57    Study diagram    Angiographic findings  Native coronary lesions:  ï¾·Mid circumflex: Lesion 1: discrete, 95 % stenosis. Intervention results  Native coronary lesions:  ï¾·drug-eluting stent of the 95 % stenosis in mid circumflex. Appearance excellent with 0 % residual stenosis. Stent: Kyra Abed Rx 2.75 x 18mm drug-eluting.     Hemodynamic tables    Pressures:  Baseline  Pressures:  - HR: 74  Pressures: - Rhythm:  Pressures:  -- Aortic Pressure (S/D/M): 122/76/41    Outputs:  Baseline  Outputs:  -- CALCULATIONS[age]  Outputs:  -- CALCULATIONS: Body Surface Area: 2.09  Outputs:  -- CALCULATIONS: Height in cm: 175.00  Outputs:  -- CALCULATIONS: Sex: Male  Outputs:  -- CALCULATIONS: Weight in k.80         Imaging:  Chest X-Ray:   No Chest XR results available for this patient. CT-scan of the chest:     Cta Dissection Protocol Chest Abdomen Pelvis W Wo Contrast    Result Date: 2020  Impression 1. No evidence of aortic dissection, intramural hematoma or aneurysm. 2.  Hepatic fatty infiltration. 3.  Mild sigmoid diverticulosis without acute diverticulitis.  Workstation performed: [id number]     Lab Review   Lab Results   Component Value Date    WBC 7.91 2022    HGB 15.4 2022    HCT 45.3 2022    MCV 91 2022    RDW 12.6 2022     2022     BMP:  Lab Results   Component Value Date    SODIUM 138 2023    K 5.1 2023     (H) 2023    CO2 26 2023    BUN 14 2023    CREATININE 1.22 2023    GLUC 118 2020    GLUF 117 (H) 2023    CALCIUM 9.4 2023    CORRECTEDCA 9.0 2020    EGFR 64 2023    MG 2.4 2023     LFT:  Lab Results   Component Value Date    AST 16 2023    ALT 30 2023    ALKPHOS 79 2023    TP 7.0 2023    ALB 3.7 2023      No components found for: "TSH3"  Lab Results   Component Value Date    VTV4YMCMAWHN 1.670 2022     Lab Results   Component Value Date    HGBA1C 5.7 (H) 2020     Lipid Profile:   Lab Results   Component Value Date    CHOLESTEROL 136 2023    HDL 47 2023    LDLCALC 63 2023    TRIG 129 2023     Lab Results   Component Value Date    CHOLESTEROL 136 2023    CHOLESTEROL 145 2022     Lab Results   Component Value Date    TROPONINI 0.07 (H) 2020     Lab Results   Component Value Date    NTBNP 231 (H) 11/22/2020          Dr. Albin Simmonds, MD [facility]      "This note has been constructed using a voice recognition system. Therefore there may be syntax, spelling, and/or grammatical errors.  Please call if you have any questions. "

## 2023-07-19 ENCOUNTER — PREP FOR PROCEDURE (OUTPATIENT)
Dept: UROLOGY | Facility: CLINIC | Age: 63
End: 2023-07-19

## 2023-07-19 ENCOUNTER — APPOINTMENT (OUTPATIENT)
Dept: RADIOLOGY | Facility: HOSPITAL | Age: 63
End: 2023-07-19
Attending: STUDENT IN AN ORGANIZED HEALTH CARE EDUCATION/TRAINING PROGRAM
Payer: COMMERCIAL

## 2023-07-19 ENCOUNTER — HOSPITAL ENCOUNTER (OUTPATIENT)
Facility: HOSPITAL | Age: 63
Setting detail: OUTPATIENT SURGERY
Discharge: HOME | End: 2023-07-19
Attending: STUDENT IN AN ORGANIZED HEALTH CARE EDUCATION/TRAINING PROGRAM | Admitting: STUDENT IN AN ORGANIZED HEALTH CARE EDUCATION/TRAINING PROGRAM
Payer: COMMERCIAL

## 2023-07-19 DIAGNOSIS — N20.1 RIGHT URETERAL STONE: ICD-10-CM

## 2023-07-19 DIAGNOSIS — R31.0 GROSS HEMATURIA: Primary | ICD-10-CM

## 2023-07-19 DIAGNOSIS — N20.1 RIGHT URETERAL STONE: Primary | ICD-10-CM

## 2023-07-19 PROCEDURE — NC001 PR NO CHARGE: Performed by: STUDENT IN AN ORGANIZED HEALTH CARE EDUCATION/TRAINING PROGRAM

## 2023-07-19 PROCEDURE — G1004 CDSM NDSC: HCPCS

## 2023-07-19 PROCEDURE — 72192 CT PELVIS W/O DYE: CPT

## 2023-07-19 NOTE — PERIOPERATIVE NURSING NOTE
Patient procedure canceled in pre-op phase. Patient had a ct scan this afternoon. Stone still visible. Patient and Doctor in agreement procedure will be rescheduled, as patient ate because he was scheduled as a local today and need to be under anesthesia. Office will reach out to reschedule. Patient and wife informed .  Patient discharged off unit,

## 2023-07-20 ENCOUNTER — TELEPHONE (OUTPATIENT)
Dept: FAMILY MEDICINE CLINIC | Facility: CLINIC | Age: 63
End: 2023-07-20

## 2023-07-20 NOTE — TELEPHONE ENCOUNTER
----- Message from Vaughn Zambrano sent at 7/20/2023  8:01 AM EDT -----  Regarding: FW: Medical follow up visit due    ----- Message -----  From: Lux Quezada MD  Sent: 7/19/2023   7:57 PM EDT  To: [address] [place] [address],[place] [address]  Subject: Medical follow up visit due                      Dear  staff: Please contact patient to arrange an office visit with  their PCP (or one of their colleagues on their team if they are not available) within the next 4 weeks for follow up of medical conditions.     Thanks, Dr. Anna Gottlieb

## 2023-07-21 ENCOUNTER — TELEPHONE (OUTPATIENT)
Dept: UROLOGY | Facility: CLINIC | Age: 63
End: 2023-07-21

## 2023-07-21 DIAGNOSIS — N40.1 BPH WITH OBSTRUCTION/LOWER URINARY TRACT SYMPTOMS: ICD-10-CM

## 2023-07-21 DIAGNOSIS — N13.8 BPH WITH OBSTRUCTION/LOWER URINARY TRACT SYMPTOMS: ICD-10-CM

## 2023-07-21 NOTE — TELEPHONE ENCOUNTER
Request for re-fill. .. TAMSULOSIN HCL 0.4 CAPSULE, 1X DAILY AT DINNER.  SENT FROM [employer]. SCANNED IN CHART

## 2023-07-24 DIAGNOSIS — N13.8 BPH WITH OBSTRUCTION/LOWER URINARY TRACT SYMPTOMS: ICD-10-CM

## 2023-07-24 DIAGNOSIS — N40.1 BPH WITH OBSTRUCTION/LOWER URINARY TRACT SYMPTOMS: ICD-10-CM

## 2023-07-24 RX ORDER — TAMSULOSIN HYDROCHLORIDE 0.4 MG/1
0.8 CAPSULE ORAL
Qty: 30 CAPSULE | Refills: 6 | Status: SHIPPED | OUTPATIENT
Start: 2023-07-24 | End: 2023-07-31 | Stop reason: SDUPTHER

## 2023-07-24 RX ORDER — TAMSULOSIN HYDROCHLORIDE 0.4 MG/1
0.4 CAPSULE ORAL
Qty: 30 CAPSULE | Refills: 0 | Status: CANCELLED | OUTPATIENT
Start: 2023-07-24

## 2023-07-24 NOTE — TELEPHONE ENCOUNTER
Patient calling to request a medication refill     increased the medication from 1 tablet daily to 2 tablets. This is why he needs a refill sooner than usual. Patient will need a quantity of 60 pills monthly instead of 30. Medication/dose? Flomax 0.4 mg    How often do you take it? Twice daily    Do you have any medication left? Almost none     30 / 90 day supply? 30 day supply with 6 refills     What pharmacy do you use for this medication?     [facility]/pharmacy #[id number] [address] [facility] [place] [address]   Phone:  [phone]  Fax:  [phone]     Patient can be reached at [phone]

## 2023-07-25 ENCOUNTER — TELEPHONE (OUTPATIENT)
Dept: UROLOGY | Facility: CLINIC | Age: 63
End: 2023-07-25

## 2023-07-25 NOTE — TELEPHONE ENCOUNTER
Patient is returning a call about scheduling surgery. He said someone emailed him details however I don't see a note.

## 2023-07-31 ENCOUNTER — TELEPHONE (OUTPATIENT)
Dept: UROLOGY | Facility: CLINIC | Age: 63
End: 2023-07-31

## 2023-07-31 DIAGNOSIS — N40.1 BPH WITH OBSTRUCTION/LOWER URINARY TRACT SYMPTOMS: ICD-10-CM

## 2023-07-31 DIAGNOSIS — N13.8 BPH WITH OBSTRUCTION/LOWER URINARY TRACT SYMPTOMS: ICD-10-CM

## 2023-07-31 RX ORDER — TAMSULOSIN HYDROCHLORIDE 0.4 MG/1
0.8 CAPSULE ORAL
Qty: 30 CAPSULE | Refills: 0 | Status: SHIPPED | OUTPATIENT
Start: 2023-07-31 | End: 2023-10-23 | Stop reason: SDUPTHER

## 2023-07-31 NOTE — TELEPHONE ENCOUNTER
2nd request for tamsulosin hcl 0.4mg capsules, qty 30. Letta Curd Letta Curd 2 capsules at dinner. ...ordered by [facility]

## 2023-08-02 ENCOUNTER — ANESTHESIA EVENT (OUTPATIENT)
Dept: PERIOP | Facility: AMBULARY SURGERY CENTER | Age: 63
End: 2023-08-02
Payer: COMMERCIAL

## 2023-08-04 ENCOUNTER — TELEPHONE (OUTPATIENT)
Dept: UROLOGY | Facility: AMBULATORY SURGERY CENTER | Age: 63
End: 2023-08-04

## 2023-08-04 NOTE — TELEPHONE ENCOUNTER
Patient calling in stating CT scan that was ordered for him by us was denied and they are refusing to pay. Patient states the insurance states they will need a peer to peer or for us to submit a claim. Patient requesting a call back regarding this.        Imaging:    CT pelvis wo contrast 7/19/23    Patient CB: [phone]    [employer]) Insurance phone number: [phone]    Ref code: [id number]

## 2023-08-16 ENCOUNTER — ANESTHESIA (OUTPATIENT)
Dept: PERIOP | Facility: AMBULARY SURGERY CENTER | Age: 63
End: 2023-08-16
Payer: COMMERCIAL

## 2023-09-22 ENCOUNTER — TELEPHONE (OUTPATIENT)
Dept: OTHER | Facility: OTHER | Age: 63
End: 2023-09-22

## 2023-09-22 ENCOUNTER — TELEMEDICINE (OUTPATIENT)
Dept: FAMILY MEDICINE CLINIC | Facility: CLINIC | Age: 63
End: 2023-09-22
Payer: COMMERCIAL

## 2023-09-22 DIAGNOSIS — I25.10 CORONARY ARTERY DISEASE DUE TO LIPID RICH PLAQUE: ICD-10-CM

## 2023-09-22 DIAGNOSIS — R73.9 HYPERGLYCEMIA: Primary | ICD-10-CM

## 2023-09-22 DIAGNOSIS — I25.83 CORONARY ARTERY DISEASE DUE TO LIPID RICH PLAQUE: ICD-10-CM

## 2023-09-22 PROCEDURE — 99441 PR PHYS/QHP TELEPHONE EVALUATION 5-10 MIN: CPT | Performed by: FAMILY MEDICINE

## 2023-09-22 RX ORDER — PREDNISOLONE ACETATE 10 MG/ML
SUSPENSION/ DROPS OPHTHALMIC
COMMUNITY
Start: 2023-08-17 | End: 2023-10-09

## 2023-09-22 RX ORDER — CYCLOSPORINE 0 G/ML
SOLUTION/ DROPS OPHTHALMIC; TOPICAL
COMMUNITY
Start: 2023-07-18

## 2023-09-22 NOTE — PROGRESS NOTES
Virtual Brief Visit    This Visit is being completed by telephone. The Patient is located at home and in [facility] in which I hold an active license    The patient was identified by name and date of birth. Kristine Pierce was informed that this is a telemedicine visit and that the visit is being conducted through telephone. Darylene Pipe He acknowledged consent and understanding of privacy and security of the telephone platform. The patient has agreed to participate and understands they can discontinue the visit at any time. Patient is aware this is a billable service. Assessment/Plan  1. COVID symptoms-the patient states headache sore throat and cough for 1 day. Symptomatic treatment with over-the-counter medicines recommended at this point. 2.  Past CAD history-lipid panel and CMP have been ordered for the patient. 3.  History of hyper glycemia. CMP ordered along with hemoglobin A1c    Problem List Items Addressed This Visit    None  Visit Diagnoses     Hyperglycemia    -  Primary    Relevant Orders    Hemoglobin A1C    Coronary artery disease due to lipid rich plaque        Relevant Orders    Comprehensive metabolic panel    Lipid panel          Recent Visits  No visits were found meeting these conditions. Showing recent visits within past 7 days and meeting all other requirements  Future Appointments  No visits were found meeting these conditions. Showing future appointments within next 150 days and meeting all other requirements         Visit Time  Total Visit Duration: 10 minutes. The patient's potential exposure to COVID was through his girlfriend's daughter roommate at [email or web address]. His girlfriend has similar symptoms that preceded his symptoms by 2 days. The patient was advised to go to his local pharmacy to get a COVID test to determine if he has the COVID virus. If the test is positive and he has further symptoms he is advised to call back for further advice.

## 2023-09-22 NOTE — TELEPHONE ENCOUNTER
Patient called to cancel appointment for today due to covid symptoms. When I went to reschedule next appointment available was November. Can appointment be switched to virtual? Please review and contact patient to let him know if it can be changed or to reschedule.

## 2023-10-09 NOTE — PRE-PROCEDURE INSTRUCTIONS
Pre-Surgery Instructions:   Medication Instructions   • aspirin 81 mg chewable tablet Hold day of surgery. • atorvastatin (LIPITOR) 80 mg tablet Take night before surgery   • Cequa 0.09 % SOLN Take day of surgery. • metoprolol tartrate (LOPRESSOR) 25 mg tablet Take day of surgery. • nitroglycerin (NITROSTAT) 0.4 mg SL tablet Uses PRN- OK to take day of surgery   • tamsulosin (FLOMAX) 0.4 mg Take night before surgery     Spoke with pt via phone. Medication instructions for day surgery reviewed. Please use only a sip of water to take your instructed medications. Avoid all over the counter vitamins, supplements and NSAIDS for one week prior to surgery per anesthesia guidelines. Tylenol is ok to take as needed. You will receive a call one business day prior to surgery with an arrival time and hospital directions. If your surgery is scheduled on a Monday, the hospital will be calling you on the Friday prior to your surgery. If you have not heard from anyone by 8pm, please call the hospital supervisor through the hospital  at [phone]. Khloe Cook [phone]). Do not eat or drink anything after midnight the night before your surgery, including candy, mints, lifesavers, or chewing gum. Do not drink alcohol 24hrs before your surgery. Try not to smoke at least 24hrs before your surgery. Follow the pre surgery showering instructions as listed in the [facility] Surgical Experience Booklet” or otherwise provided by your surgeon's office. Do not shave the surgical area 24 hours before surgery. Do not apply any lotions, creams, including makeup, cologne, deodorant, or perfumes after showering on the day of your surgery. No contact lenses, eye make-up, or artificial eyelashes. Remove nail polish, including gel polish, and any artificial, gel, or acrylic nails if possible. Remove all jewelry including rings and body piercing jewelry. Wear causal clothing that is easy to take on and off.  Consider your type of surgery. Keep any valuables, jewelry, piercings at home. Please bring any specially ordered equipment (sling, braces) if indicated. Arrange for a responsible person to drive you to and from the hospital on the day of your surgery. Visitor Guidelines discussed. Call the surgeon's office with any new illnesses, exposures, or additional questions prior to surgery. Please reference your [facility] Surgical Experience Booklet” for additional information to prepare for your upcoming surgery.

## 2023-10-10 ENCOUNTER — APPOINTMENT (OUTPATIENT)
Dept: LAB | Facility: CLINIC | Age: 63
End: 2023-10-10
Payer: COMMERCIAL

## 2023-10-10 DIAGNOSIS — I25.83 CORONARY ARTERY DISEASE DUE TO LIPID RICH PLAQUE: ICD-10-CM

## 2023-10-10 DIAGNOSIS — N20.1 RIGHT URETERAL STONE: ICD-10-CM

## 2023-10-10 DIAGNOSIS — R73.9 HYPERGLYCEMIA: ICD-10-CM

## 2023-10-10 DIAGNOSIS — I25.10 CORONARY ARTERY DISEASE DUE TO LIPID RICH PLAQUE: ICD-10-CM

## 2023-10-10 LAB
ALBUMIN SERPL BCP-MCNC: 4.2 G/DL (ref 3.5–5)
ALP SERPL-CCNC: 73 U/L (ref 34–104)
ALT SERPL W P-5'-P-CCNC: 23 U/L (ref 7–52)
ANION GAP SERPL CALCULATED.3IONS-SCNC: 6 MMOL/L
AST SERPL W P-5'-P-CCNC: 18 U/L (ref 13–39)
BILIRUB SERPL-MCNC: 0.97 MG/DL (ref 0.2–1)
BUN SERPL-MCNC: 15 MG/DL (ref 5–25)
CALCIUM SERPL-MCNC: 9.2 MG/DL (ref 8.4–10.2)
CHLORIDE SERPL-SCNC: 108 MMOL/L (ref 96–108)
CHOLEST SERPL-MCNC: 126 MG/DL
CO2 SERPL-SCNC: 30 MMOL/L (ref 21–32)
CREAT SERPL-MCNC: 0.99 MG/DL (ref 0.6–1.3)
EST. AVERAGE GLUCOSE BLD GHB EST-MCNC: 137 MG/DL
GFR SERPL CREATININE-BSD FRML MDRD: 82 ML/MIN/1.73SQ M
GLUCOSE P FAST SERPL-MCNC: 119 MG/DL (ref 65–99)
HBA1C MFR BLD: 6.4 %
HDLC SERPL-MCNC: 46 MG/DL
LDLC SERPL CALC-MCNC: 60 MG/DL (ref 0–100)
NONHDLC SERPL-MCNC: 80 MG/DL
POTASSIUM SERPL-SCNC: 4.6 MMOL/L (ref 3.5–5.3)
PROT SERPL-MCNC: 6.9 G/DL (ref 6.4–8.4)
SODIUM SERPL-SCNC: 144 MMOL/L (ref 135–147)
TRIGL SERPL-MCNC: 101 MG/DL

## 2023-10-10 PROCEDURE — 80053 COMPREHEN METABOLIC PANEL: CPT

## 2023-10-10 PROCEDURE — 87086 URINE CULTURE/COLONY COUNT: CPT

## 2023-10-10 PROCEDURE — 80061 LIPID PANEL: CPT

## 2023-10-10 PROCEDURE — 83036 HEMOGLOBIN GLYCOSYLATED A1C: CPT

## 2023-10-10 PROCEDURE — 36415 COLL VENOUS BLD VENIPUNCTURE: CPT

## 2023-10-11 LAB — BACTERIA UR CULT: NORMAL

## 2023-10-17 ENCOUNTER — HOSPITAL ENCOUNTER (OUTPATIENT)
Dept: RADIOLOGY | Facility: HOSPITAL | Age: 63
Discharge: HOME | End: 2023-10-17
Payer: COMMERCIAL

## 2023-10-17 DIAGNOSIS — N20.1 RIGHT URETERAL STONE: ICD-10-CM

## 2023-10-17 DIAGNOSIS — N20.1 RIGHT URETERAL STONE: Primary | ICD-10-CM

## 2023-10-17 PROCEDURE — 72192 CT PELVIS W/O DYE: CPT

## 2023-10-17 PROCEDURE — G1004 CDSM NDSC: HCPCS

## 2023-10-17 PROCEDURE — NC001 PR NO CHARGE: Performed by: UROLOGY

## 2023-10-17 NOTE — H&P
HISTORY AND PHYSICAL  ? ? Patient Name: Mi Feng  Patient MRN: [id number]  Attending Provider: Adri Goetz MD  Service: Urology  Chief Complaint    Right distal ureteral stone    HPI   Mi Feng is a 63 y.o. male with gross hematuria recently, CT imaging shows a 4 mm stone distal right ureter. Never had any type of renal colic at all. I plan cystoscopy, right ureteroscopy, laser stone, stent. Potential risks and complications discussed, and informed consent was given by the patient. Medications  Meds/Allergies   No current facility-administered medications for this encounter. Cannot display prior to admission medications because the patient has not been admitted in this contact. No current facility-administered medications for this encounter.     Current Outpatient Medications:     aspirin 81 mg chewable tablet, Chew 2 tablets (162 mg total) daily, Disp: 30 tablet, Rfl: 0    atorvastatin (LIPITOR) 80 mg tablet, TAKE 1 TABLET BY MOUTH EVERY DAY WITH DINNER, Disp: 90 tablet, Rfl: 3    Cequa 0.09 % SOLN, INSTILL 1 DROP INTO BOTH EYES TWICE A DAY FOR 3 MONTHS, Disp: , Rfl:     metoprolol tartrate (LOPRESSOR) 25 mg tablet, TAKE 1 TABLET (25 MG TOTAL) BY MOUTH EVERY 12 (TWELVE) HOURS, Disp: 180 tablet, Rfl: 3    nitroglycerin (NITROSTAT) 0.4 mg SL tablet, Place 1 tablet (0.4 mg total) under the tongue every 5 (five) minutes as needed for chest pain, Disp: 60 tablet, Rfl: 0    tamsulosin (FLOMAX) 0.4 mg, Take 2 capsules (0.8 mg total) by mouth daily with dinner, Disp: 30 capsule, Rfl: 0  Review of Systems  10 point review of systems negative except as noted in HPI  Allergies  No Known Allergies  PMH  Past Medical History:   Diagnosis Date    Cancer ([address])     basal cell- facial cheek, chest    Decreased hearing     bilateral    Dry eyes     Heart attack ([address]) 2020    1 stent    Hematuria     Kidney stone     Myocardial infarction ([address])     2020-one stent    Spinal stenosis     Wears glasses      Past surgical history  Past Surgical History:   Procedure Laterality Date    COLONOSCOPY      CORONARY ANGIOPLASTY WITH STENT PLACEMENT  2020    HERNIA REPAIR      umbilical, inguinal    LIPOMA RESECTION      right shoulder     Social history  Social History     Tobacco Use    Smoking status: Never    Smokeless tobacco: Never   Vaping Use    Vaping Use: Never used   Substance Use Topics    Alcohol use: Yes     Comment: socially     Drug use: Yes     Types: Marijuana     Comment: Gummies to help him sleep     ?   Physical Exam    .vs  General appearance: alert and oriented, in no acute distress  Head: Normocephalic, without obvious abnormality, atraumatic  Throat: lips, mucosa, and tongue normal; teeth and gums normal  Neck: no adenopathy, no carotid bruit, no JVD, supple, symmetrical, trachea midline, and thyroid not enlarged, symmetric, no tenderness/mass/nodules  Lungs: clear to auscultation bilaterally  Heart: regular rate and rhythm, S1, S2 normal, no murmur, click, rub or gallop  Abdomen: soft, non-tender; bowel sounds normal; no masses,  no organomegaly  Extremities: extremities normal, warm and well-perfused; no cyanosis, clubbing, or edema  Perla Bhandari MD

## 2023-10-18 ENCOUNTER — HOSPITAL ENCOUNTER (OUTPATIENT)
Facility: AMBULARY SURGERY CENTER | Age: 63
Setting detail: OUTPATIENT SURGERY
Discharge: HOME | End: 2023-10-18
Attending: UROLOGY | Admitting: UROLOGY
Payer: COMMERCIAL

## 2023-10-18 ENCOUNTER — APPOINTMENT (OUTPATIENT)
Dept: RADIOLOGY | Facility: AMBULARY SURGERY CENTER | Age: 63
End: 2023-10-18
Payer: COMMERCIAL

## 2023-10-18 ENCOUNTER — TELEPHONE (OUTPATIENT)
Dept: UROLOGY | Facility: HOSPITAL | Age: 63
End: 2023-10-18

## 2023-10-18 VITALS
SYSTOLIC BLOOD PRESSURE: 142 MMHG | HEIGHT: 69 IN | TEMPERATURE: 97.6 F | RESPIRATION RATE: 16 BRPM | OXYGEN SATURATION: 97 % | WEIGHT: 204 LBS | HEART RATE: 74 BPM | DIASTOLIC BLOOD PRESSURE: 83 MMHG | BODY MASS INDEX: 30.21 KG/M2

## 2023-10-18 DIAGNOSIS — N20.1 RIGHT URETERAL STONE: ICD-10-CM

## 2023-10-18 DIAGNOSIS — R31.0 GROSS HEMATURIA: ICD-10-CM

## 2023-10-18 PROCEDURE — 74420 UROGRAPHY RTRGR +-KUB: CPT

## 2023-10-18 PROCEDURE — 99024 POSTOP FOLLOW-UP VISIT: CPT | Performed by: UROLOGY

## 2023-10-18 PROCEDURE — C2617 STENT, NON-COR, TEM W/O DEL: HCPCS | Performed by: UROLOGY

## 2023-10-18 PROCEDURE — 52356 CYSTO/URETERO W/LITHOTRIPSY: CPT | Performed by: UROLOGY

## 2023-10-18 PROCEDURE — C1769 GUIDE WIRE: HCPCS | Performed by: UROLOGY

## 2023-10-18 PROCEDURE — 82360 CALCULUS ASSAY QUANT: CPT | Performed by: UROLOGY

## 2023-10-18 DEVICE — VARIABLE LENGTH INJECTION STENT SET
Type: IMPLANTABLE DEVICE | Site: URETER | Status: FUNCTIONAL
Brand: CONTOUR VL™ INJECTION STENT SET

## 2023-10-18 RX ORDER — SODIUM CHLORIDE, SODIUM LACTATE, POTASSIUM CHLORIDE, CALCIUM CHLORIDE 600; 310; 30; 20 MG/100ML; MG/100ML; MG/100ML; MG/100ML
INJECTION, SOLUTION INTRAVENOUS CONTINUOUS PRN
Status: DISCONTINUED | OUTPATIENT
Start: 2023-10-18 | End: 2023-10-19

## 2023-10-18 RX ORDER — DEXAMETHASONE SODIUM PHOSPHATE 10 MG/ML
INJECTION, SOLUTION INTRAMUSCULAR; INTRAVENOUS AS NEEDED
Status: DISCONTINUED | OUTPATIENT
Start: 2023-10-18 | End: 2023-10-19

## 2023-10-18 RX ORDER — KETOROLAC TROMETHAMINE 30 MG/ML
INJECTION, SOLUTION INTRAMUSCULAR; INTRAVENOUS AS NEEDED
Status: DISCONTINUED | OUTPATIENT
Start: 2023-10-18 | End: 2023-10-19

## 2023-10-18 RX ORDER — ONDANSETRON 2 MG/ML
INJECTION INTRAMUSCULAR; INTRAVENOUS AS NEEDED
Status: DISCONTINUED | OUTPATIENT
Start: 2023-10-18 | End: 2023-10-19

## 2023-10-18 RX ORDER — MAGNESIUM HYDROXIDE 1200 MG/15ML
LIQUID ORAL AS NEEDED
Status: DISCONTINUED | OUTPATIENT
Start: 2023-10-18 | End: 2023-10-18 | Stop reason: HOSPADM

## 2023-10-18 RX ORDER — EPHEDRINE SULFATE 50 MG/ML
INJECTION INTRAVENOUS AS NEEDED
Status: DISCONTINUED | OUTPATIENT
Start: 2023-10-18 | End: 2023-10-19

## 2023-10-18 RX ORDER — OXYCODONE HYDROCHLORIDE AND ACETAMINOPHEN 5; 325 MG/1; MG/1
1 TABLET ORAL EVERY 4 HOURS PRN
Status: DISCONTINUED | OUTPATIENT
Start: 2023-10-18 | End: 2023-10-18 | Stop reason: HOSPADM

## 2023-10-18 RX ORDER — PROMETHAZINE HYDROCHLORIDE 25 MG/ML
12.5 INJECTION, SOLUTION INTRAMUSCULAR; INTRAVENOUS ONCE AS NEEDED
Status: DISCONTINUED | OUTPATIENT
Start: 2023-10-18 | End: 2023-10-18 | Stop reason: HOSPADM

## 2023-10-18 RX ORDER — HYDROCODONE BITARTRATE AND ACETAMINOPHEN 5; 325 MG/1; MG/1
1-2 TABLET ORAL EVERY 6 HOURS PRN
Qty: 12 TABLET | Refills: 0 | Status: SHIPPED | OUTPATIENT
Start: 2023-10-18 | End: 2023-10-28

## 2023-10-18 RX ORDER — CEFAZOLIN SODIUM 2 G/50ML
2000 SOLUTION INTRAVENOUS ONCE
Status: COMPLETED | OUTPATIENT
Start: 2023-10-18 | End: 2023-10-18

## 2023-10-18 RX ORDER — PROPOFOL 10 MG/ML
INJECTION, EMULSION INTRAVENOUS AS NEEDED
Status: DISCONTINUED | OUTPATIENT
Start: 2023-10-18 | End: 2023-10-19

## 2023-10-18 RX ORDER — FENTANYL CITRATE 50 UG/ML
INJECTION, SOLUTION INTRAMUSCULAR; INTRAVENOUS AS NEEDED
Status: DISCONTINUED | OUTPATIENT
Start: 2023-10-18 | End: 2023-10-19

## 2023-10-18 RX ORDER — LIDOCAINE HYDROCHLORIDE 10 MG/ML
INJECTION, SOLUTION EPIDURAL; INFILTRATION; INTRACAUDAL; PERINEURAL AS NEEDED
Status: DISCONTINUED | OUTPATIENT
Start: 2023-10-18 | End: 2023-10-19

## 2023-10-18 RX ORDER — CEFUROXIME AXETIL 250 MG/1
250 TABLET ORAL EVERY 12 HOURS SCHEDULED
Qty: 14 TABLET | Refills: 0 | Status: SHIPPED | OUTPATIENT
Start: 2023-10-18 | End: 2023-10-25

## 2023-10-18 RX ORDER — FENTANYL CITRATE/PF 50 MCG/ML
25 SYRINGE (ML) INJECTION
Status: DISCONTINUED | OUTPATIENT
Start: 2023-10-18 | End: 2023-10-18 | Stop reason: HOSPADM

## 2023-10-18 RX ADMIN — FENTANYL CITRATE 25 MCG: 50 INJECTION INTRAMUSCULAR; INTRAVENOUS at 13:58

## 2023-10-18 RX ADMIN — DEXAMETHASONE SODIUM PHOSPHATE 10 MG: 10 INJECTION, SOLUTION INTRAMUSCULAR; INTRAVENOUS at 14:00

## 2023-10-18 RX ADMIN — CEFAZOLIN SODIUM 2000 MG: 2 SOLUTION INTRAVENOUS at 14:00

## 2023-10-18 RX ADMIN — KETOROLAC TROMETHAMINE 15 MG: 30 INJECTION, SOLUTION INTRAMUSCULAR; INTRAVENOUS at 14:40

## 2023-10-18 RX ADMIN — FENTANYL CITRATE 25 MCG: 50 INJECTION INTRAMUSCULAR; INTRAVENOUS at 14:03

## 2023-10-18 RX ADMIN — FENTANYL CITRATE 25 MCG: 50 INJECTION INTRAMUSCULAR; INTRAVENOUS at 14:23

## 2023-10-18 RX ADMIN — FENTANYL CITRATE 25 MCG: 50 INJECTION INTRAMUSCULAR; INTRAVENOUS at 14:07

## 2023-10-18 RX ADMIN — SODIUM CHLORIDE, SODIUM LACTATE, POTASSIUM CHLORIDE, AND CALCIUM CHLORIDE: .6; .31; .03; .02 INJECTION, SOLUTION INTRAVENOUS at 13:48

## 2023-10-18 RX ADMIN — LIDOCAINE HYDROCHLORIDE 50 MG: 10 INJECTION, SOLUTION EPIDURAL; INFILTRATION; INTRACAUDAL; PERINEURAL at 13:57

## 2023-10-18 RX ADMIN — ONDANSETRON 4 MG: 2 INJECTION INTRAMUSCULAR; INTRAVENOUS at 14:27

## 2023-10-18 RX ADMIN — PROPOFOL 200 MG: 10 INJECTION, EMULSION INTRAVENOUS at 13:57

## 2023-10-18 RX ADMIN — EPHEDRINE SULFATE 5 MG: 50 INJECTION INTRAVENOUS at 14:18

## 2023-10-18 RX ADMIN — PROPOFOL 50 MG: 10 INJECTION, EMULSION INTRAVENOUS at 14:00

## 2023-10-18 RX ADMIN — PROPOFOL 20 MG: 10 INJECTION, EMULSION INTRAVENOUS at 14:36

## 2023-10-18 RX ADMIN — EPHEDRINE SULFATE 10 MG: 50 INJECTION INTRAVENOUS at 14:11

## 2023-10-18 NOTE — DISCHARGE SUMMARY
Discharge Summary - Bentley Tao 63 y.o. male MRN: [id number]    Admission Date: 10/18/2023     Admitting Diagnosis: Right ureteral stone [N20.1]  Gross hematuria [R31.0]    Procedures Performed: Right ureteroscopy, laser stone, stent    Patient underwent planned outpt surgery and recovered without complication. Discharged in good condition. Medications were prescribed. Pt knows to have office follow-up at the appropriate time.

## 2023-10-18 NOTE — ANESTHESIA PREPROCEDURE EVALUATION
Procedure:  CYSTOSCOPY URETEROSCOPY WITH LITHOTRIPSY HOLMIUM LASER, RETROGRADE PYELOGRAM AND INSERTION STENT URETERAL (Right: Bladder)    Relevant Problems   CARDIO   (+) Chest pain   (+) Hyperlipidemia   (+) NSTEMI (non-ST elevated myocardial infarction) (HCC)             Anesthesia Plan  ASA Score- 3     Anesthesia Type- general with ASA Monitors. Additional Monitors:     Airway Plan: LMA. Plan Factors-    Chart reviewed. Induction- intravenous. Postoperative Plan- Plan for postoperative opioid use. Informed Consent- Anesthetic plan and risks discussed with patient.

## 2023-10-18 NOTE — TELEPHONE ENCOUNTER
----- Message from Emory Lomax RN sent at 10/18/2023  3:05 PM EDT -----    ----- Message -----  From: Shashank Hicks MD  Sent: 10/18/2023   2:44 PM EDT  To: [facility]     Laser stone stent today  Nurse visit Tuesday or Wednesday for string out

## 2023-10-18 NOTE — QUICK NOTE
See op report  Tight urethral stricture found at surgery, required dilation. Distal ureteral stone fragmented and extracted, stent placed. Stent for 6 to 7 days, removed by nurse with string at that time. He is expected to have passive blood per urethra between urinations, because of the stricture. I put in an order for 24 urine stone risk profile, and gave him instructions to  the container at the lab, please tell him about that again.

## 2023-10-18 NOTE — DISCHARGE INSTR - AVS FIRST PAGE
ALL YOUR  PREVIOUS MEDS ARE THE SAME. NEW MEDS: Hydrocodone if needed for pain  Cefuroxime antibiotic    BE CAREFUL NOT TO PULL THE STRING. EXPECT SOME BLOOD IN URINE, BURNING, FREQUENT URINATION. ACTIVITY:  RESUME FULL ACTIVITY 2 to 3 days, but be careful of string. You will do a urine test called "24-hour stone risk profile" to help find out why you make stones. Stop by any [facility]'s lab within the next 2 weeks and  a special container for the test.  Pick a day where for 24 hours in a row, every drop of urine goes in the container. Most people do this on a Sunday morning to a Monday morning.   Bring the container back to the lab, they analyze the level of minerals in your urine, calcium, oxalate, etc.  Results take up to 2 weeks and we will let you know

## 2023-10-18 NOTE — ANESTHESIA POSTPROCEDURE EVALUATION
Post-Op Assessment Note    CV Status:  Stable  Pain Score: 1    Pain management: adequate     Mental Status:  Arousable and sleepy   Hydration Status:  Euvolemic   PONV Controlled:  Controlled   Airway Patency:  Patent   Two or more mitigation strategies used for obstructive sleep apnea   Post Op Vitals Reviewed: Yes      Staff: CRNA         No notable events documented.     BP   155/78   Temp  97.4   Pulse 63   Resp 18   SpO2 100% 4L FM

## 2023-10-18 NOTE — OP NOTE
OPERATIVE REPORT  PATIENT NAME: Fabiana Briggs    :  1960  MRN: [id number]  Pt Location: [facility] OR ROOM 04    SURGERY DATE: 10/18/2023    Surgeon(s) and Role:     Debi Hyde MD - Primary    Preop Diagnosis:  Right ureteral stone [N20.1]  Gross hematuria [R31.0]    Post-Op Diagnosis Codes:     * Right ureteral stone [N20.1]     * Gross hematuria [R31.0]    Stricture of bulbar urethra, postprocedural    Procedure(s):  Right - CYSTOSCOPY URETEROSCOPY WITH LITHOTRIPSY HOLMIUM LASER. RETROGRADE PYELOGRAM AND INSERTION STENT URETERAL. URETHERAL DILATION    Specimen(s):  ID Type Source Tests Collected by Time Destination   1 :  Calculus Ureter, Right STONE ANALYSIS, TISSUE EXAM Debi Hyde MD 10/18/2023  2:33 PM        Estimated Blood Loss:   Minimal    Drains:  Ureteral Internal Stent Right ureter 6 Fr. (Active)   Number of days: 0       Anesthesia Type:   General    Operative Indications:  Right ureteral stone [N20.1]  Gross hematuria [R31.0]      Operative Findings:  1. Stricture bulbar urethra  2. Stone lodged in distal right ureter 3 cm up from the bladder  3. Stone lasered and extracted, stent placed    Complications:   None    Procedure and Technique:        PLAN FOR STENT: Will be removed by nurse with string next Monday or Tuesday      The patient was brought into the OR, properly identified and positioned on the table. General anesthesia was induced, and they were placed in lithotomy position and prepped and draped using chlorhexidine solution. The 22F scope was introduced in the urethra, but I found a tight stricture of the bulbar urethra. The scope would not go. I placed a wire through the stricture, and then dilated with Hema's from 16-24 [place]. I then repassed the scope and it went through the stricture, which was nicely dilated. There was minimal bleeding from the stricture. Prostate was moderately enlarged.       The right ureteral orifice was identified and retrograde pyelogram performed, showing narrowing about 3 cm up. .  One wire was placed up the ureter. The rigid ureteroscope was introduced and carefully advanced up to stone. The Holmium laser fiber was introduced thru the scope and advanced to the edge of stone. Using various laser settings, stone was lasered into numerous small particles. Care was taken not to laser tissue. Large particles were basketed. All other remaining particles appeared small enough to pass around the stent. The scope was removed from the ureter, and the cystoscope was used to place a 6F Contour VL stent in the ureter, with the upper coils in the renal pelvis, and the distal coil in the bladder. Retrograde pyelogram on that side confirmed good position and no extravasation of contrast.   The patient was awaken from anesthesia and taken to the PACU in good condition. I was present for the entire procedure.     Patient Disposition:  PACU         SIGNATURE: Fer Palomo MD  DATE: October 18, 2023  TIME: 3:12 PM

## 2023-10-19 NOTE — TELEPHONE ENCOUNTER
PT called to schedule his stent removal and to discuss how he's feeling post op. PT also has a question re: the stone risk profile order.      Call back [phone]

## 2023-10-19 NOTE — TELEPHONE ENCOUNTER
Post Op Note    Lewis Moreno is a 63 y.o. male s/p Cysto stent performed 10/18/2023. Lewis Moreno is a patient of Dr. Dr. Curtis Pruett and is seen at the [facility] office. How would you rate your pain on a scale from 1 to 10, 10 being the worst pain ever? 5  Have you had a fever? No  Have your bowel movements been regular? No  Do you have any difficulty urinating? No just some blood  Do you have any other questions or concerns that I can address at this time? Patient lives in [facility] and had questioned to be seen there - we had no availability at this time. Scheduled patient for stent removal at the [facility].  He also questioned if he has to do another Stone risk as he just had one completed in July

## 2023-10-19 NOTE — TELEPHONE ENCOUNTER
Post Op Note    Angelita Zimmerman is a 63 y.o. male s/p Cysto R stent performed 10/18/2023. Angelita Zimmerman is a patient of Dr. Dr. Waldemar Multani and is seen at the [facility].  (Previous Louieyocasta Singh)    LM for Peter Pugh to see how he is doing after surgery and to schedule stent removal

## 2023-10-20 NOTE — TELEPHONE ENCOUNTER
Called Beny and notified him per Dr Aditi Main he does not need another stone risk assessment .  However Dr Aditi Main states he should drink 2 liters of water - He understood

## 2023-10-23 DIAGNOSIS — N13.8 BPH WITH OBSTRUCTION/LOWER URINARY TRACT SYMPTOMS: ICD-10-CM

## 2023-10-23 DIAGNOSIS — N40.1 BPH WITH OBSTRUCTION/LOWER URINARY TRACT SYMPTOMS: ICD-10-CM

## 2023-10-23 RX ORDER — TAMSULOSIN HYDROCHLORIDE 0.4 MG/1
0.8 CAPSULE ORAL
Qty: 30 CAPSULE | Refills: 0 | Status: SHIPPED | OUTPATIENT
Start: 2023-10-23 | End: 2023-11-02 | Stop reason: SDUPTHER

## 2023-10-23 NOTE — TELEPHONE ENCOUNTER
Patient of the [place] office, stent placed by Dr Curtis Pruett. [address] schedule does not have availability this week. Please reschedule or have patient remove his own stent.

## 2023-10-23 NOTE — TELEPHONE ENCOUNTER
Called Patient back and schedule him at the [facility] office with Dorcas Lowery.  Reviewed stent removal process with him again  and also bowel regime as he hasn't been able tof have regular since stent placed

## 2023-10-26 LAB
CALCIUM OXALATE DIHYDRATE MFR STONE IR: 80 %
COLOR STONE: NORMAL
COM MFR STONE: 20 %
COMMENT-STONE3: NORMAL
COMPOSITION: NORMAL
LABORATORY COMMENT REPORT: NORMAL
PHOTO: NORMAL
SIZE STONE: NORMAL MM
SPEC SOURCE SUBJ: NORMAL
STONE ANALYSIS-IMP: NORMAL
WT STONE: 6 MG

## 2023-11-02 DIAGNOSIS — N13.8 BPH WITH OBSTRUCTION/LOWER URINARY TRACT SYMPTOMS: ICD-10-CM

## 2023-11-02 DIAGNOSIS — N40.1 BPH WITH OBSTRUCTION/LOWER URINARY TRACT SYMPTOMS: ICD-10-CM

## 2023-11-02 RX ORDER — TAMSULOSIN HYDROCHLORIDE 0.4 MG/1
0.8 CAPSULE ORAL
Qty: 60 CAPSULE | Refills: 0 | Status: SHIPPED | OUTPATIENT
Start: 2023-11-02

## 2023-11-02 RX ORDER — TAMSULOSIN HYDROCHLORIDE 0.4 MG/1
0.8 CAPSULE ORAL
Qty: 30 CAPSULE | Refills: 0 | OUTPATIENT
Start: 2023-11-02

## 2023-11-13 DIAGNOSIS — I21.4 NSTEMI (NON-ST ELEVATION MYOCARDIAL INFARCTION) (HCC): ICD-10-CM

## 2023-12-27 ENCOUNTER — HOSPITAL ENCOUNTER (EMERGENCY)
Facility: HOSPITAL | Age: 63
Discharge: HOME | End: 2023-12-28
Attending: EMERGENCY MEDICINE
Payer: COMMERCIAL

## 2023-12-27 ENCOUNTER — APPOINTMENT (EMERGENCY)
Dept: RADIOLOGY | Facility: HOSPITAL | Age: 63
End: 2023-12-27
Payer: COMMERCIAL

## 2023-12-27 VITALS
RESPIRATION RATE: 17 BRPM | HEART RATE: 71 BPM | OXYGEN SATURATION: 99 % | DIASTOLIC BLOOD PRESSURE: 84 MMHG | TEMPERATURE: 98 F | SYSTOLIC BLOOD PRESSURE: 165 MMHG

## 2023-12-27 DIAGNOSIS — R79.89 ELEVATED TROPONIN: ICD-10-CM

## 2023-12-27 DIAGNOSIS — R07.9 CHEST PAIN, UNSPECIFIED: Primary | ICD-10-CM

## 2023-12-27 LAB
2HR DELTA HS TROPONIN: 0 NG/L
ALBUMIN SERPL BCP-MCNC: 4.2 G/DL (ref 3.5–5)
ALP SERPL-CCNC: 74 U/L (ref 34–104)
ALT SERPL W P-5'-P-CCNC: 23 U/L (ref 7–52)
ANION GAP SERPL CALCULATED.3IONS-SCNC: 7 MMOL/L
AST SERPL W P-5'-P-CCNC: 16 U/L (ref 13–39)
BASOPHILS # BLD AUTO: 0.09 THOUSANDS/ÂΜL (ref 0–0.1)
BASOPHILS NFR BLD AUTO: 1 % (ref 0–1)
BILIRUB SERPL-MCNC: 0.59 MG/DL (ref 0.2–1)
BUN SERPL-MCNC: 16 MG/DL (ref 5–25)
CALCIUM SERPL-MCNC: 8.8 MG/DL (ref 8.4–10.2)
CARDIAC TROPONIN I PNL SERPL HS: 8 NG/L
CARDIAC TROPONIN I PNL SERPL HS: 8 NG/L
CHLORIDE SERPL-SCNC: 105 MMOL/L (ref 96–108)
CO2 SERPL-SCNC: 28 MMOL/L (ref 21–32)
CREAT SERPL-MCNC: 1.05 MG/DL (ref 0.6–1.3)
EOSINOPHIL # BLD AUTO: 0.19 THOUSAND/ÂΜL (ref 0–0.61)
EOSINOPHIL NFR BLD AUTO: 2 % (ref 0–6)
ERYTHROCYTE [DISTWIDTH] IN BLOOD BY AUTOMATED COUNT: 13.2 % (ref 11.6–15.1)
FLUAV RNA RESP QL NAA+PROBE: NEGATIVE
FLUBV RNA RESP QL NAA+PROBE: NEGATIVE
GFR SERPL CREATININE-BSD FRML MDRD: 76 ML/MIN/1.73SQ M
GLUCOSE SERPL-MCNC: 126 MG/DL (ref 65–140)
HCT VFR BLD AUTO: 44.4 % (ref 36.5–49.3)
HGB BLD-MCNC: 15.1 G/DL (ref 12–17)
IMM GRANULOCYTES # BLD AUTO: 0.03 THOUSAND/UL (ref 0–0.2)
IMM GRANULOCYTES NFR BLD AUTO: 0 % (ref 0–2)
LYMPHOCYTES # BLD AUTO: 2.2 THOUSANDS/ÂΜL (ref 0.6–4.47)
LYMPHOCYTES NFR BLD AUTO: 24 % (ref 14–44)
MCH RBC QN AUTO: 31.1 PG (ref 26.8–34.3)
MCHC RBC AUTO-ENTMCNC: 34 G/DL (ref 31.4–37.4)
MCV RBC AUTO: 91 FL (ref 82–98)
MONOCYTES # BLD AUTO: 0.72 THOUSAND/ÂΜL (ref 0.17–1.22)
MONOCYTES NFR BLD AUTO: 8 % (ref 4–12)
NEUTROPHILS # BLD AUTO: 5.96 THOUSANDS/ÂΜL (ref 1.85–7.62)
NEUTS SEG NFR BLD AUTO: 65 % (ref 43–75)
NRBC BLD AUTO-RTO: 0 /100 WBCS
PLATELET # BLD AUTO: 251 THOUSANDS/UL (ref 149–390)
PMV BLD AUTO: 9.3 FL (ref 8.9–12.7)
POTASSIUM SERPL-SCNC: 3.9 MMOL/L (ref 3.5–5.3)
PROT SERPL-MCNC: 6.7 G/DL (ref 6.4–8.4)
RBC # BLD AUTO: 4.86 MILLION/UL (ref 3.88–5.62)
RSV RNA RESP QL NAA+PROBE: NEGATIVE
SARS-COV-2 RNA RESP QL NAA+PROBE: NEGATIVE
SODIUM SERPL-SCNC: 140 MMOL/L (ref 135–147)
WBC # BLD AUTO: 9.19 THOUSAND/UL (ref 4.31–10.16)

## 2023-12-27 PROCEDURE — 99285 EMERGENCY DEPT VISIT HI MDM: CPT

## 2023-12-27 PROCEDURE — 0241U HB NFCT DS VIR RESP RNA 4 TRGT: CPT

## 2023-12-27 PROCEDURE — 80053 COMPREHEN METABOLIC PANEL: CPT | Performed by: EMERGENCY MEDICINE

## 2023-12-27 PROCEDURE — 71045 X-RAY EXAM CHEST 1 VIEW: CPT

## 2023-12-27 PROCEDURE — 93005 ELECTROCARDIOGRAM TRACING: CPT

## 2023-12-27 PROCEDURE — 36415 COLL VENOUS BLD VENIPUNCTURE: CPT

## 2023-12-27 PROCEDURE — 84484 ASSAY OF TROPONIN QUANT: CPT | Performed by: EMERGENCY MEDICINE

## 2023-12-27 PROCEDURE — 85025 COMPLETE CBC W/AUTO DIFF WBC: CPT | Performed by: EMERGENCY MEDICINE

## 2023-12-27 PROCEDURE — 99285 EMERGENCY DEPT VISIT HI MDM: CPT | Performed by: EMERGENCY MEDICINE

## 2023-12-28 DIAGNOSIS — Z95.5 S/P CORONARY ARTERY STENT PLACEMENT: ICD-10-CM

## 2023-12-28 LAB
ATRIAL RATE: 75 BPM
P AXIS: 37 DEGREES
PR INTERVAL: 158 MS
QRS AXIS: 5 DEGREES
QRSD INTERVAL: 100 MS
QT INTERVAL: 382 MS
QTC INTERVAL: 426 MS
T WAVE AXIS: 29 DEGREES
VENTRICULAR RATE: 75 BPM

## 2023-12-28 RX ORDER — NITROGLYCERIN 0.4 MG/1
0.4 TABLET SUBLINGUAL
Qty: 60 TABLET | Refills: 0 | Status: SHIPPED | OUTPATIENT
Start: 2023-12-28

## 2023-12-28 NOTE — DISCHARGE INSTRUCTIONS
Return to the ER if you develop:    Worsening chest pain with associated sweating, nausea/vomiting, arm pain, jaw pain, lightheadedness; leg pain/swelling.

## 2023-12-28 NOTE — ED ATTENDING ATTESTATION
2023  IShashank DO, saw and evaluated the patient. I have discussed the patient with the resident/non-physician practitioner and agree with the resident's/non-physician practitioner's findings, Plan of Care, and MDM as documented in the resident's/non-physician practitioner's note, except where noted. All available labs and Radiology studies were reviewed.  I was present for key portions of any procedure(s) performed by the resident/non-physician practitioner and I was immediately available to provide assistance.       At this point I agree with the current assessment done in the Emergency Department.  I have conducted an independent evaluation of this patient a history and physical is as follows: 63-year-old male, past medical history MI with stent to circumflex, presenting with superior left-sided chest pain near the clavicle.  He notes onset at rest with first episode occurring earlier this morning and self resolving lasting only a few minutes and with second and with second event occurring at 7:30 PM also lasting a few minutes.  He noted he took  nitroglycerin and is unclear as to whether that helped the symptoms.  Patient states that his MI was different than today and that it was exertional, lower and centralized chest pain, was associated with radiation of pain.  He notes today's pain was distinctly different than his previous cardiac issue.  He denies any shortness of breath, dyspnea on exertion as of recent, change in exercise tolerance, radiation of pain, nausea or vomiting.  Notes mild diaphoresis secondary to pain.  Significant other at bedside verbalized agreement of the after mentioned.  He notes neither pleuritic nor positional in nature.    Vital signs stable.  Patient resting comfortably.  Lungs clear to auscultation.  Left superior aspect of the chest without tenderness to palpation or overlying skin changes.  Heart regular rhythm and without murmur.  Abdomen soft nontender.   Bilateral lower extremities without edema, calf tenderness.    63-year-old male with unclear etiology of left superior chest pain.  Troponins negative x 2 and second taken approximately 4 hours out from the most recent event.  EKG with slight nonspecific changes from prior but overall improved as compared to previous strain pattern with slight elevation in aVR as well as depressions inferior laterally which have now resolved.  Reassuring the patient states pain is distinctly different than previous MI and which she describes classic ACS symptoms.  At most, heart score 5.  As such, ambulatory referral placed to cardiology for outpatient follow-up.  Given the diaphoresis did occur with the event and patient's history, I did offer him observation with further restratification inpatient as well as visitation by cardiology in the a.m.  He requested discharge home with outpatient follow-up verbalizing the limitations of this approach.  Significant other at bedside also verbalized understanding the same. Reviewed all findings both relevant and incidental with the patient at bedside. Pt verbalized understanding of findings, neccesary follow up, return to ED precautions. Pt agreed to review today's findings with their primary care provider. Pt non-toxic appearing upon discharge.       ED Course         Critical Care Time  Procedures

## 2023-12-28 NOTE — ED PROVIDER NOTES
History  Chief Complaint   Patient presents with    Chest Pain     Pt reports pain in the center of his chest that began earlier this AM. Pt reports hx of MI with stent placement in 2020. Pt also reports mild SOB.      64yo M w/ h/o MI (s/p stent placement in 2020) presenting for CP. Approximately 12hrs ago while Pt was working at his computer he developed pinching left upper chest pain lasting 5min then self-resolving. At that time he had diaphoresis, neck stiffness, and SOB. He had another episode of CP approximately 3hrs ago while on the couch. At that time he took nitro which he had left over. He is uncertain whether the nitro helped or not. Notably, Pt states that this CP isn't like his previous pain (longer lasting, occurred with activity, and the pain was in the center of his chest). Denies F/C, cough, HA, abdominal pain, PE risk factors, back pain, N/V, LE swelling/pain.      History provided by:  Patient  Chest Pain  Associated symptoms: diaphoresis and shortness of breath    Associated symptoms: no abdominal pain, no cough, no fever, no nausea and not vomiting        Prior to Admission Medications   Prescriptions Last Dose Informant Patient Reported? Taking?   Cequa 0.09 % SOLN   Yes No   Sig: INSTILL 1 DROP INTO BOTH EYES TWICE A DAY FOR 3 MONTHS   aspirin 81 mg chewable tablet  Self No No   Sig: Chew 2 tablets (162 mg total) daily   atorvastatin (LIPITOR) 80 mg tablet   No No   Sig: TAKE 1 TABLET BY MOUTH EVERY DAY WITH DINNER   metoprolol tartrate (LOPRESSOR) 25 mg tablet   No No   Sig: Take 1 tablet (25 mg total) by mouth every 12 (twelve) hours   nitroglycerin (NITROSTAT) 0.4 mg SL tablet  Self No No   Sig: Place 1 tablet (0.4 mg total) under the tongue every 5 (five) minutes as needed for chest pain   tamsulosin (FLOMAX) 0.4 mg   No No   Sig: Take 2 capsules (0.8 mg total) by mouth daily with dinner      Facility-Administered Medications: None       Past Medical History:   Diagnosis Date    Cancer  (HCC)     basal cell- facial cheek, chest    Decreased hearing     bilateral    Dry eyes     Heart attack ([facility]) 2020    1 stent    Hematuria     Kidney stone     Myocardial infarction ([facility])     2020-one stent    Spinal stenosis     Wears glasses        Past Surgical History:   Procedure Laterality Date    COLONOSCOPY      CORONARY ANGIOPLASTY WITH STENT PLACEMENT  2020    FL RETROGRADE PYELOGRAM  10/18/2023    HERNIA REPAIR      umbilical, inguinal    LIPOMA RESECTION      right shoulder    MS CYSTO/URETERO W/LITHOTRIPSY &INDWELL STENT INSRT Right 10/18/2023    Procedure: CYSTOSCOPY URETEROSCOPY WITH LITHOTRIPSY HOLMIUM LASER, RETROGRADE PYELOGRAM AND INSERTION STENT URETERAL, URETHERAL DILATION;  Surgeon: Pérez Gonsalez MD;  Location: [facility] MAIN OR;  Service: Urology       Family History   Problem Relation Age of Onset    Dementia Mother     Diabetes Father     Peripheral vascular disease Father     Diabetes Sister     Diabetes Brother      I have reviewed and agree with the history as documented.    E-Cigarette/Vaping    E-Cigarette Use Never User      E-Cigarette/Vaping Substances    Nicotine No     THC No     CBD No     Flavoring No     Other No     Unknown No      Social History     Tobacco Use    Smoking status: Never    Smokeless tobacco: Never   Vaping Use    Vaping status: Never Used   Substance Use Topics    Alcohol use: Yes     Comment: socially     Drug use: Yes     Types: Marijuana     Comment: Gummies to help him sleep        Review of Systems   Constitutional:  Positive for diaphoresis. Negative for chills and fever.   HENT: Negative.     Respiratory:  Positive for shortness of breath. Negative for cough.    Cardiovascular:  Positive for chest pain.   Gastrointestinal:  Negative for abdominal pain, nausea and vomiting.   Genitourinary: Negative.    Musculoskeletal:  Positive for neck stiffness.   Skin: Negative.    Neurological: Negative.    Psychiatric/Behavioral: Negative.         Physical Exam  ED  Triage Vitals [12/27/23 2019]   Temperature Pulse Respirations Blood Pressure SpO2   98 °F (36.7 °C) 80 18 (!) 178/101 99 %      Temp Source Heart Rate Source Patient Position - Orthostatic VS BP Location FiO2 (%)   Oral Monitor Sitting Right arm --      Pain Score       --             Orthostatic Vital Signs  Vitals:    12/27/23 2019 12/27/23 2238   BP: (!) 178/101 165/84   Pulse: 80 71   Patient Position - Orthostatic VS: Sitting        Physical Exam  Constitutional:       General: He is not in acute distress.     Appearance: Normal appearance.   HENT:      Head: Normocephalic and atraumatic.      Right Ear: External ear normal.      Left Ear: External ear normal.      Nose: Nose normal. No rhinorrhea.      Mouth/Throat:      Mouth: Mucous membranes are moist.      Pharynx: Oropharynx is clear.   Eyes:      Extraocular Movements: Extraocular movements intact.      Conjunctiva/sclera: Conjunctivae normal.   Cardiovascular:      Rate and Rhythm: Normal rate and regular rhythm.      Pulses: Normal pulses.      Heart sounds: Normal heart sounds.   Pulmonary:      Effort: Pulmonary effort is normal.   Abdominal:      General: Abdomen is flat.      Palpations: Abdomen is soft.   Musculoskeletal:         General: No tenderness.      Right lower leg: No edema.      Left lower leg: No edema.   Skin:     General: Skin is warm and dry.      Capillary Refill: Capillary refill takes less than 2 seconds.   Neurological:      General: No focal deficit present.      Mental Status: He is alert and oriented to person, place, and time.   Psychiatric:         Mood and Affect: Mood normal.         Behavior: Behavior normal.         ED Medications  Medications - No data to display    Diagnostic Studies  Results Reviewed       Procedure Component Value Units Date/Time    FLU/RSV/COVID - if FLU/RSV clinically relevant [[id number]]  (Normal) Collected: 12/27/23 2238    Lab Status: Final result Specimen: Nares from Nose Updated: 12/27/23  2338     SARS-CoV-2 Negative     INFLUENZA A PCR Negative     INFLUENZA B PCR Negative     RSV PCR Negative    Narrative:      FOR PEDIATRIC PATIENTS - copy/paste COVID Guidelines URL to browser: [email or web address]    SARS-CoV-2 assay is a Nucleic Acid Amplification assay intended for the  qualitative detection of nucleic acid from SARS-CoV-2 in nasopharyngeal  swabs. Results are for the presumptive identification of SARS-CoV-2 RNA.    Positive results are indicative of infection with SARS-CoV-2, the virus  causing COVID-19, but do not rule out bacterial infection or co-infection  with other viruses. Laboratories within the [place] and its  territories are required to report all positive results to the appropriate  public health authorities. Negative results do not preclude SARS-CoV-2  infection and should not be used as the sole basis for treatment or other  patient management decisions. Negative results must be combined with  clinical observations, patient history, and epidemiological information.  This test has not been FDA cleared or approved.    This test has been authorized by FDA under an Emergency Use Authorization  (EUA). This test is only authorized for the duration of time the  declaration that circumstances exist justifying the authorization of the  emergency use of an in vitro diagnostic tests for detection of SARS-CoV-2  virus and/or diagnosis of COVID-19 infection under section 564(b)(1) of  the Act, 21 U.S.C. 360bbb-3(b)(1), unless the authorization is terminated  or revoked sooner. The test has been validated but independent review by FDA  and CLIA is pending.    Test performed using [employer]: This RT-PCR assay targets N2,  a region unique to SARS-CoV-2. A conserved region in the E-gene was chosen  for pan-Sarbecovirus detection which includes SARS-CoV-2.    According to CMS-2020-01-R, this platform meets the definition of  high-throughput technology.    HS Troponin I 2hr [[id number]]  (Normal) Collected: 12/27/23 2238    Lab Status: Final result Specimen: Blood from Arm, Left Updated: 12/27/23 2319     hs TnI 2hr 8 ng/L      Delta 2hr hsTnI 0 ng/L     HS Troponin I 4hr [[id number]]     Lab Status: No result Specimen: Blood     HS Troponin 0hr (reflex protocol) [[id number]]  (Normal) Collected: 12/27/23 2024    Lab Status: Final result Specimen: Blood from Arm, Left Updated: 12/27/23 2056     hs TnI 0hr 8 ng/L     Comprehensive metabolic panel [[id number]] Collected: 12/27/23 2024    Lab Status: Final result Specimen: Blood from Arm, Left Updated: 12/27/23 2049     Sodium 140 mmol/L      Potassium 3.9 mmol/L      Chloride 105 mmol/L      CO2 28 mmol/L      ANION GAP 7 mmol/L      BUN 16 mg/dL      Creatinine 1.05 mg/dL      Glucose 126 mg/dL      Calcium 8.8 mg/dL      AST 16 U/L      ALT 23 U/L      Alkaline Phosphatase 74 U/L      Total Protein 6.7 g/dL      Albumin 4.2 g/dL      Total Bilirubin 0.59 mg/dL      eGFR 76 ml/min/1.73sq m     Narrative:      National Kidney Disease Foundation guidelines for Chronic Kidney Disease (CKD):     Stage 1 with normal or high GFR (GFR > 90 mL/min/1.73 square meters)    Stage 2 Mild CKD (GFR = 60-89 mL/min/1.73 square meters)    Stage 3A Moderate CKD (GFR = 45-59 mL/min/1.73 square meters)    Stage 3B Moderate CKD (GFR = 30-44 mL/min/1.73 square meters)    Stage 4 Severe CKD (GFR = 15-29 mL/min/1.73 square meters)    Stage 5 End Stage CKD (GFR <15 mL/min/1.73 square meters)  Note: GFR calculation is accurate only with a steady state creatinine    CBC and differential [[id number]] Collected: 12/27/23 2024    Lab Status: Final result Specimen: Blood from Arm, Left Updated: 12/27/23 2034     WBC 9.19 Thousand/uL      RBC 4.86 Million/uL      Hemoglobin 15.1 g/dL      Hematocrit 44.4 %      MCV 91 fL      MCH 31.1 pg      MCHC 34.0 g/dL      RDW 13.2 %      MPV 9.3 fL      Platelets 251 Thousands/uL       nRBC 0 /100 WBCs      Neutrophils Relative 65 %      Immat GRANS % 0 %      Lymphocytes Relative 24 %      Monocytes Relative 8 %      Eosinophils Relative 2 %      Basophils Relative 1 %      Neutrophils Absolute 5.96 Thousands/µL      Immature Grans Absolute 0.03 Thousand/uL      Lymphocytes Absolute 2.20 Thousands/µL      Monocytes Absolute 0.72 Thousand/µL      Eosinophils Absolute 0.19 Thousand/µL      Basophils Absolute 0.09 Thousands/µL                    XR chest 1 view portable   ED Interpretation by Jesus Clemens MD (12/27 2217)   No evidence of acute cardiopulmonary pathology            Procedures  ECG 12 Lead Documentation Only    Date/Time: 12/27/2023 10:13 PM    Performed by: Jesus Clemens MD  Authorized by: Jesus Clemens MD    ECG reviewed by me, the ED Provider: yes    Patient location:  ED  Interpretation:     Interpretation: abnormal    Quality:     Tracing quality:  Limited by artifact  Rate:     ECG rate:  75    ECG rate assessment: normal    Rhythm:     Rhythm: sinus rhythm    Ectopy:     Ectopy: none    QRS:     QRS axis:  Normal    QRS intervals:  Wide  Conduction:     Conduction: abnormal      Abnormal conduction: incomplete RBBB    ST segments:     ST segments:  Normal  T waves:     T waves: normal          ED Course  ED Course as of 12/27/23 2358   Wed Dec 27, 2023   2217 hs TnI 0hr: 8   2332 Delta 2hr hsTnI: 0             HEART Risk Score      Flowsheet Row Most Recent Value   Heart Score Risk Calculator    History 1 Filed at: 12/27/2023 2348   ECG 0 Filed at: 12/27/2023 2348   Age 1 Filed at: 12/27/2023 2348   Risk Factors 2 Filed at: 12/27/2023 2348   Troponin 0 Filed at: 12/27/2023 2348   HEART Score 4 Filed at: 12/27/2023 2348                                  Medical Decision Making  Ddx includes but not limited to PNA, PTX, ACS; less likely PE d/t wnl VS, no pleurisy, absent PE risk factors. EKG and troponin x2 wnl, making ACS/MI less likely. CXR without evidence of cardiomegaly,  PNA, pleural effusion, pulmonary edema, PTX. Advised Pt to f/u with his cardiologist regarding his ED visit. Strict return precautions discussed.     Amount and/or Complexity of Data Reviewed  Labs: ordered. Decision-making details documented in ED Course.  Radiology: ordered and independent interpretation performed.          Disposition  Final diagnoses:   Chest pain, unspecified   Elevated troponin     Time reflects when diagnosis was documented in both MDM as applicable and the Disposition within this note       Time User Action Codes Description Comment    12/27/2023 11:43 PM Jesus Clemens [R07.9] Chest pain, unspecified     12/27/2023 11:57 PM Jesus Clemens [R79.89] Elevated troponin           ED Disposition       ED Disposition   Discharge    Condition   Stable    Date/Time   Wed Dec 27, 2023 3930    Comment   Beyn Jackson discharge to home/self care.                   Follow-up Information       Follow up With Specialties Details Why Contact Info Additional Information    [facility] Emergency Medicine Go to  If symptoms worsen [address] [place] [id number]  [phone] [facility], [facility] [place], [place], [address]    [facility] Cardiology [facility] Schedule an appointment as soon as possible for a visit  As needed [address] [facility] [place] [address]  [phone] [facility], [address] [place], [place], [address]   [phone]            Patient's Medications   Discharge Prescriptions    No medications on file         PDMP Review       None             ED Provider  Attending physically available and evaluated Beny Jackson. I managed the patient along with the ED Attending.    Electronically Signed by           Jesus Clemens MD  12/27/23 3557

## 2023-12-30 DIAGNOSIS — N40.1 BPH WITH OBSTRUCTION/LOWER URINARY TRACT SYMPTOMS: ICD-10-CM

## 2023-12-30 DIAGNOSIS — N13.8 BPH WITH OBSTRUCTION/LOWER URINARY TRACT SYMPTOMS: ICD-10-CM

## 2024-01-02 RX ORDER — TAMSULOSIN HYDROCHLORIDE 0.4 MG/1
0.8 CAPSULE ORAL
Qty: 60 CAPSULE | Refills: 5 | Status: SHIPPED | OUTPATIENT
Start: 2024-01-02

## 2024-01-05 ENCOUNTER — OFFICE VISIT (OUTPATIENT)
Dept: CARDIOLOGY CLINIC | Facility: CLINIC | Age: 64
End: 2024-01-05
Payer: COMMERCIAL

## 2024-01-05 VITALS
OXYGEN SATURATION: 96 % | DIASTOLIC BLOOD PRESSURE: 82 MMHG | SYSTOLIC BLOOD PRESSURE: 130 MMHG | HEART RATE: 66 BPM | BODY MASS INDEX: 31.7 KG/M2 | HEIGHT: 69 IN | WEIGHT: 214 LBS

## 2024-01-05 DIAGNOSIS — R07.89 OTHER CHEST PAIN: Primary | ICD-10-CM

## 2024-01-05 PROCEDURE — 99214 OFFICE O/P EST MOD 30 MIN: CPT | Performed by: PHYSICIAN ASSISTANT

## 2024-01-05 RX ORDER — CYCLOSPORINE 0.5 MG/ML
EMULSION OPHTHALMIC
COMMUNITY
Start: 2023-12-08

## 2024-01-05 RX ORDER — VARENICLINE 0.03 MG/.05ML
1 SPRAY NASAL 2 TIMES DAILY
COMMUNITY
Start: 2023-12-18

## 2024-01-05 NOTE — PROGRESS NOTES
Progress Note - Cardiology Office  [facility]    Beny Jackson 63 y.o. male MRN: [id number]  : 1960  Encounter: [id number]      Assessment:     Coronary artery disease.  Essential hypertension.  Mixed hyperlipidemia.    Discussion Summary and Plan:    Coronary artery disease.  - s/p PCI with DEBORA to mid Lcx (20).   - Patient was evaluated at [facility] campus on 23 for evaluation for chest pain.  He reported that he had developed some left upper chest pain while working on his computer earlier in the day that lasted for approximately 5 minutes and then resolved.  He had reported chest pain was associated with diaphoresis, neck stiffness and shortness of breath.  He stated he had another episode of chest pain while on the couch prompting him to take nitroglycerin and seek medical attention.  He is unsure if the nitroglycerin helped his chest pain or not.  Troponin x 2 negative. 24 EKG noted normal sinus rhythm 75 bpm, incomplete RBBB.  Patient was discharged home with instructions to follow-up with outpatient cardiology.  - 23 EKG: Normal sinus rhythm, 75 bpm.  Incomplete right bundle branch block.  - Patient denies experiencing any further episodes of chest pain/discomfort since discharge from [facility] ED on 23.   - Continue Lopressor 25 mg twice daily.  - Continue Lipitor 80 mg daily.  - Continue aspirin 162 mg daily.  - 20 TTE: LVEF 60-65%. There were no regional wall motion abnormalities.  Trace mitral valve regurgitation.    Essential hypertension.  - BP during today's office visit, 130/82.   - Currently on Lopressor 25 mg twice daily.  Continue at this time.  - 20 TTE: LVEF 60-65%. There were no regional wall motion abnormalities. Trace mitral valve regurgitation.    Mixed hyperlipidemia.  - 10/10/23 lipid panel: Cholesterol 126, triglycerides 129, HDL 46, LDL 60.  - Currently on Lipitor 80 mg daily.      Patient /  Caretaker was advised and educated to call our office  immediately if  patient has any new symptoms of chest pain/shortness of breath, near-syncope, syncope, light headedness sustained palpitations  or any other cardiovascular symptoms before their scheduled follow-up appointment.  Office number was provided #[phone].  Please call [phone] if any questions.  Counseling :  A description of the counseling.  Goals and Barriers.  Patient's ability to self care: Yes  Medication side effect reviewed with patient in detail and all their questions answered to their satisfaction.    HPI :     Beny Jackson is a 63 y.o. male with PMHx of CAD, NSTEMI s/p PCI with DEBORA to mid LCx (11/24/20), essential HTN, mixed hyperlipidemia, who presents for recent ED discharge follow-up for chest pain.     Patient was evaluated at [facility] ED campus on 12/27/23 for evaluation for chest pain.  He reported that he had developed some left upper chest pain while working on his computer earlier in the day that lasted for approximately 5 minutes and then resolved.  He had reported chest pain was associated with diaphoresis, neck stiffness and shortness of breath.  He stated he had another episode of chest pain while on the couch prompting him to take nitroglycerin and seek medical attention.  He is unsure if the nitroglycerin helped his chest pain or not.  Troponin x 2 negative. 12/27/24 EKG noted normal sinus rhythm 75 bpm, incomplete RBBB.  Patient was discharged home with instructions to follow-up with outpatient cardiology.      Since discharge from [facility] ED patient reports been doing well.  He denies experiencing any further episodes of chest discomfort.  He denies experiencing palpitations, shortness of breath, lightheadedness, dizziness, headache, nausea, vomiting or diaphoresis.       Patient reports this is the first episode of chest pain/discomfort since his 2020 cardiac catheterization with PCI/DEBORA to mid  LCx.    Review of Systems   All other systems reviewed and are negative.      Historical Information   Past Medical History:   Diagnosis Date    Cancer (HCC)     basal cell- facial cheek, chest    Decreased hearing     bilateral    Dry eyes     Heart attack (HCC) 2020    1 stent    Hematuria     Kidney stone     Myocardial infarction (HCC)     2020-one stent    Spinal stenosis     Wears glasses      Past Surgical History:   Procedure Laterality Date    COLONOSCOPY      CORONARY ANGIOPLASTY WITH STENT PLACEMENT  2020    FL RETROGRADE PYELOGRAM  10/18/2023    HERNIA REPAIR      umbilical, inguinal    LIPOMA RESECTION      right shoulder    IN CYSTO/URETERO W/LITHOTRIPSY &INDWELL STENT INSRT Right 10/18/2023    Procedure: CYSTOSCOPY URETEROSCOPY WITH LITHOTRIPSY HOLMIUM LASER, RETROGRADE PYELOGRAM AND INSERTION STENT URETERAL, URETHERAL DILATION;  Surgeon: Pérez Gonsalez MD;  Location: [facility] MAIN OR;  Service: Urology     Social History     Substance and Sexual Activity   Alcohol Use Yes    Comment: socially      Social History     Substance and Sexual Activity   Drug Use Yes    Types: Marijuana    Comment: Gummies to help him sleep     Social History     Tobacco Use   Smoking Status Never   Smokeless Tobacco Never     Family History:   Family History   Problem Relation Age of Onset    Dementia Mother     Diabetes Father     Peripheral vascular disease Father     Diabetes Sister     Diabetes Brother        Meds/Allergies     No Known Allergies    Current Outpatient Medications:     aspirin 81 mg chewable tablet, Chew 2 tablets (162 mg total) daily, Disp: 30 tablet, Rfl: 0    atorvastatin (LIPITOR) 80 mg tablet, TAKE 1 TABLET BY MOUTH EVERY DAY WITH DINNER, Disp: 90 tablet, Rfl: 0    Cequa 0.09 % SOLN, INSTILL 1 DROP INTO BOTH EYES TWICE A DAY FOR 3 MONTHS, Disp: , Rfl:     metoprolol tartrate (LOPRESSOR) 25 mg tablet, Take 1 tablet (25 mg total) by mouth every 12 (twelve) hours, Disp: 180 tablet, Rfl: 0     "nitroglycerin (NITROSTAT) 0.4 mg SL tablet, Place 1 tablet (0.4 mg total) under the tongue every 5 (five) minutes as needed for chest pain, Disp: 60 tablet, Rfl: 0    Restasis 0.05 % ophthalmic emulsion, INSTILL 1 DROP IN BOTH EYES 2 TIMES DAILY, Disp: , Rfl:     tamsulosin (FLOMAX) 0.4 mg, Take 2 capsules (0.8 mg total) by mouth daily with dinner, Disp: 60 capsule, Rfl: 5    Tyrvaya 0.03 MG/ACT SOLN, 1 spray by Each Nare route 2 (two) times a day, Disp: , Rfl:     Vitals: Blood pressure 130/82, pulse 66, height 5' 9\" (1.753 m), weight 97.1 kg (214 lb), SpO2 96%.    Body mass index is 31.6 kg/m².  Wt Readings from Last 3 Encounters:   01/05/24 97.1 kg (214 lb)   10/18/23 92.5 kg (204 lb)   07/13/23 96.6 kg (213 lb)     Vitals:    01/05/24 1310   Weight: 97.1 kg (214 lb)     BP Readings from Last 3 Encounters:   01/05/24 130/82   12/27/23 165/84   10/18/23 142/83       Physical Exam:  Physical Exam  Vitals reviewed.   Constitutional:       General: He is not in acute distress.  Cardiovascular:      Rate and Rhythm: Normal rate and regular rhythm.      Pulses: Normal pulses.      Heart sounds: Murmur heard.   Pulmonary:      Effort: Pulmonary effort is normal. No respiratory distress.   Abdominal:      General: Abdomen is flat. There is no distension.      Palpations: Abdomen is soft.      Tenderness: There is no abdominal tenderness.   Musculoskeletal:      Right lower leg: No edema.      Left lower leg: No edema.   Skin:     General: Skin is warm and dry.   Neurological:      Mental Status: He is alert and oriented to person, place, and time.     Diagnostic Studies Review Cardio:    Cardiac testing:   Results for orders placed during the hospital encounter of 11/22/20    Echo complete with contrast if indicated    Narrative  [address] [address] [phone]    Transthoracic Echocardiogram  2D, M-mode, Doppler, and Color Doppler    Study date:  " 2020    Patient: NARGIS JAUREGUI  MR number: [id number]  Account number: [id number]  : 1960  Age: 59 years  Gender: Male  Status: Outpatient  Location: Bedside  Height: 69 in  Weight: 209.7 lb  BP: 128/ 74 mmHg    Indications: Heart Failure    Diagnoses: I50.9 - Heart failure, unspecified    Sonographer:  CLAY Javed  Referring Physician:  Jose Rolle MD  Group:  [facility]  Interpreting Physician:  Prashant Gaston MD    SUMMARY    LEFT VENTRICLE:  Systolic function was normal. Ejection fraction was estimated in the range of 60 % to 65 %.  There were no regional wall motion abnormalities.    MITRAL VALVE:  There was trace regurgitation.    HISTORY: PRIOR HISTORY: Spinal Stenosis, HLD, Basal Cell Carcinoma    PROCEDURE: The procedure was performed at the bedside. This was a routine study. The transthoracic approach was used. The study included complete 2D imaging, M-mode, complete spectral Doppler, and color Doppler. The heart rate was 72 bpm,  at the start of the study. Image quality was adequate.    LEFT VENTRICLE: Size was normal. Systolic function was normal. Ejection fraction was estimated in the range of 60 % to 65 %. There were no regional wall motion abnormalities. Wall thickness was normal. No evidence of apical thrombus.  DOPPLER: Left ventricular diastolic function parameters were normal.    RIGHT VENTRICLE: The size was normal. Systolic function was normal with TAPSE-1.9cm Wall thickness was normal.    LEFT ATRIUM: Size was normal.    RIGHT ATRIUM: Size was normal.    MITRAL VALVE: There was mild thickening. There was normal leaflet separation. DOPPLER: The transmitral velocity was within the normal range. There was no evidence for stenosis. There was trace regurgitation.    AORTIC VALVE: The valve was trileaflet. Leaflets exhibited normal thickness and normal cuspal separation. DOPPLER: Transaortic velocity was within the normal range. There was no evidence for  "stenosis. There was no significant  regurgitation.    TRICUSPID VALVE: The valve structure was normal. There was normal leaflet separation. DOPPLER: The transtricuspid velocity was within the normal range. There was no evidence for stenosis. There was no significant regurgitation.    PULMONIC VALVE: Leaflets exhibited normal thickness, no calcification, and normal cuspal separation. DOPPLER: The transpulmonic velocity was within the normal range. There was no significant regurgitation.    PERICARDIUM: There was no pericardial effusion. The pericardium was normal in appearance.    AORTA: The root exhibited normal size.    SYSTEMIC VEINS: IVC: The inferior vena cava was normal in size.    SYSTEM MEASUREMENT TABLES    2D  EF (Teich): 66.69 %    PW  MV E/A Ratio: 0.87    [employer] Accredited Echocardiography Laboratory    Prepared and electronically signed by    Prashant Gaston MD  Signed 23-Nov-2020 13:25:07    Imaging:  Chest X-Ray:   No Chest XR results available for this patient.    CT-scan of the chest:     No CTA results available for this patient.  Lab Review   Lab Results   Component Value Date    WBC 9.19 12/27/2023    HGB 15.1 12/27/2023    HCT 44.4 12/27/2023    MCV 91 12/27/2023    RDW 13.2 12/27/2023     12/27/2023     BMP:  Lab Results   Component Value Date    SODIUM 140 12/27/2023    K 3.9 12/27/2023     12/27/2023    CO2 28 12/27/2023    BUN 16 12/27/2023    CREATININE 1.05 12/27/2023    GLUC 126 12/27/2023    GLUF 119 (H) 10/10/2023    CALCIUM 8.8 12/27/2023    CORRECTEDCA 9.0 11/24/2020    EGFR 76 12/27/2023    MG 2.4 07/03/2023     Troponins:    LFT:  Lab Results   Component Value Date    AST 16 12/27/2023    ALT 23 12/27/2023    ALKPHOS 74 12/27/2023    TP 6.7 12/27/2023    ALB 4.2 12/27/2023      No components found for: \"TSH3\"  Lab Results   Component Value Date    FYS0BJVNXCYY 1.670 07/28/2022     Lab Results   Component Value Date    HGBA1C 6.4 (H) 10/10/2023     Lipid " Profile:   Lab Results   Component Value Date    CHOLESTEROL 126 10/10/2023    HDL 46 10/10/2023    LDLCALC 60 10/10/2023    TRIG 101 10/10/2023     Lab Results   Component Value Date    CHOLESTEROL 126 10/10/2023    CHOLESTEROL 136 05/17/2023     Lab Results   Component Value Date    TROPONINI 0.07 (H) 11/23/2020     Lab Results   Component Value Date    NTBNP 231 (H) 11/22/2020      Recent Results (from the past 672 hour(s))   ECG 12 lead    Collection Time: 12/27/23  8:22 PM   Result Value Ref Range    Ventricular Rate 75 BPM    Atrial Rate 75 BPM    NH Interval 158 ms    QRSD Interval 100 ms    QT Interval 382 ms    QTC Interval 426 ms    P Axis 37 degrees    QRS Axis 5 degrees    T Wave Axis 29 degrees   CBC and differential    Collection Time: 12/27/23  8:24 PM   Result Value Ref Range    WBC 9.19 4.31 - 10.16 Thousand/uL    RBC 4.86 3.88 - 5.62 Million/uL    Hemoglobin 15.1 12.0 - 17.0 g/dL    Hematocrit 44.4 36.5 - 49.3 %    MCV 91 82 - 98 fL    MCH 31.1 26.8 - 34.3 pg    MCHC 34.0 31.4 - 37.4 g/dL    RDW 13.2 11.6 - 15.1 %    MPV 9.3 8.9 - 12.7 fL    Platelets 251 149 - 390 Thousands/uL    nRBC 0 /100 WBCs    Neutrophils Relative 65 43 - 75 %    Immat GRANS % 0 0 - 2 %    Lymphocytes Relative 24 14 - 44 %    Monocytes Relative 8 4 - 12 %    Eosinophils Relative 2 0 - 6 %    Basophils Relative 1 0 - 1 %    Neutrophils Absolute 5.96 1.85 - 7.62 Thousands/µL    Immature Grans Absolute 0.03 0.00 - 0.20 Thousand/uL    Lymphocytes Absolute 2.20 0.60 - 4.47 Thousands/µL    Monocytes Absolute 0.72 0.17 - 1.22 Thousand/µL    Eosinophils Absolute 0.19 0.00 - 0.61 Thousand/µL    Basophils Absolute 0.09 0.00 - 0.10 Thousands/µL   Comprehensive metabolic panel    Collection Time: 12/27/23  8:24 PM   Result Value Ref Range    Sodium 140 135 - 147 mmol/L    Potassium 3.9 3.5 - 5.3 mmol/L    Chloride 105 96 - 108 mmol/L    CO2 28 21 - 32 mmol/L    ANION GAP 7 mmol/L    BUN 16 5 - 25 mg/dL    Creatinine 1.05 0.60 - 1.30  "mg/dL    Glucose 126 65 - 140 mg/dL    Calcium 8.8 8.4 - 10.2 mg/dL    AST 16 13 - 39 U/L    ALT 23 7 - 52 U/L    Alkaline Phosphatase 74 34 - 104 U/L    Total Protein 6.7 6.4 - 8.4 g/dL    Albumin 4.2 3.5 - 5.0 g/dL    Total Bilirubin 0.59 0.20 - 1.00 mg/dL    eGFR 76 ml/min/1.73sq m   HS Troponin 0hr (reflex protocol)    Collection Time: 12/27/23  8:24 PM   Result Value Ref Range    hs TnI 0hr 8 \"Refer to ACS Flowchart\"- see link ng/L   HS Troponin I 2hr    Collection Time: 12/27/23 10:38 PM   Result Value Ref Range    hs TnI 2hr 8 \"Refer to ACS Flowchart\"- see link ng/L    Delta 2hr hsTnI 0 <20 ng/L   FLU/RSV/COVID - if FLU/RSV clinically relevant    Collection Time: 12/27/23 10:38 PM    Specimen: Nose; Nares   Result Value Ref Range    SARS-CoV-2 Negative Negative    INFLUENZA A PCR Negative Negative    INFLUENZA B PCR Negative Negative    RSV PCR Negative Negative             Maribel Charles PA-C   "

## 2024-01-13 DIAGNOSIS — I21.4 NSTEMI (NON-ST ELEVATION MYOCARDIAL INFARCTION) (HCC): ICD-10-CM

## 2024-01-15 RX ORDER — ATORVASTATIN CALCIUM 80 MG/1
TABLET, FILM COATED ORAL
Qty: 30 TABLET | Refills: 2 | Status: SHIPPED | OUTPATIENT
Start: 2024-01-15

## 2024-02-24 DIAGNOSIS — I21.4 NSTEMI (NON-ST ELEVATION MYOCARDIAL INFARCTION) (HCC): ICD-10-CM

## 2024-03-19 ENCOUNTER — OFFICE VISIT (OUTPATIENT)
Dept: CARDIOLOGY CLINIC | Facility: CLINIC | Age: 64
End: 2024-03-19
Payer: COMMERCIAL

## 2024-03-19 VITALS
HEART RATE: 75 BPM | SYSTOLIC BLOOD PRESSURE: 138 MMHG | OXYGEN SATURATION: 98 % | WEIGHT: 216 LBS | BODY MASS INDEX: 31.99 KG/M2 | HEIGHT: 69 IN | DIASTOLIC BLOOD PRESSURE: 98 MMHG

## 2024-03-19 DIAGNOSIS — E66.9 OBESITY (BMI 30-39.9): ICD-10-CM

## 2024-03-19 DIAGNOSIS — Z95.5 S/P CORONARY ARTERY STENT PLACEMENT: ICD-10-CM

## 2024-03-19 DIAGNOSIS — Z87.442 HISTORY OF KIDNEY STONES: ICD-10-CM

## 2024-03-19 DIAGNOSIS — R07.89 OTHER CHEST PAIN: ICD-10-CM

## 2024-03-19 DIAGNOSIS — I25.10 CORONARY ARTERY DISEASE INVOLVING NATIVE CORONARY ARTERY OF NATIVE HEART WITHOUT ANGINA PECTORIS: ICD-10-CM

## 2024-03-19 DIAGNOSIS — I11.9 HYPERTENSIVE HEART DISEASE WITHOUT HEART FAILURE: ICD-10-CM

## 2024-03-19 DIAGNOSIS — E78.2 MIXED HYPERLIPIDEMIA: ICD-10-CM

## 2024-03-19 PROCEDURE — 99214 OFFICE O/P EST MOD 30 MIN: CPT | Performed by: INTERNAL MEDICINE

## 2024-03-19 NOTE — PROGRESS NOTES
Progress Note - Cardiology Office  [facility]    Beny Jackson 63 y.o. male MRN: [id number]  : 1960  Encounter: [id number]      Assessment:     1. Other chest pain    2. Coronary artery disease involving native coronary artery of native heart without angina pectoris    3. S/P coronary artery stent placement    4. Hypertensive heart disease without heart failure    5. Mixed hyperlipidemia    6. History of kidney stones    7. Obesity (BMI 30-39.9)        Discussion Summary and Plan:    1.   Coronary artery disease status post non ST elevation MI status post stenting of LCX with drug coated stent.    All issues related to drug coated stent discusse discussed with patient.  He has completed dual platelet therapy for more than 18 months.  He had an episode of chest pain he was in the emergency room he will be scheduled for exercise stress test.    2. Dyslipidemia.  Intensity statin blood test in May 2023 acceptable he will get his yearly blood test with his primary team electrolyte was stable in 2023.     3. Essential hypertension.  Patient blood pressure is pretty well controlled with low-dose metoprolol.  Heart rate is acceptable.  Currently he is on metoprolol 25 every 12 hourly.      4. Obesity with BMI around 31. He has gained some weight since last visit.  That could cause some with exertion shortness of breath advised him to lose weight     5.  History of hematuria and kidney stone.  Mesh management as per urology.    6.  Episode of chest pain.  Exercise stress disorder further plans also stress test become available.      All questions related to coronary artery disease, need for blood thinner, statins, monitoring liver enzyme all discussed with patient all their questions answered to satisfaction.  Follow-up 6 months.    Please call [phone] if any questions.  Counseling :  A description of the counseling.  Goals and Barriers.  Patient's ability to self care:  Yes  Medication side effect reviewed with patient in detail and all their questions answered to their satisfaction.    HPI :     Beny Jackson is a 63 y.o. year old male who came for follow up. Patient hasMedical history significant for hypertension dyslipidemia was recently admitted to [facility] with chest pain and underwent cardiac catheterization found to have significant stenosis of mid circumflex.  Which was successfully stented with drug-eluting stent.  He came for follow-up he is doing well denies any chest pain any shortness of breath no nausea no vomiting no fever no chills no PND no orthopnea no other issues at this time.        7/13/2023.    Above reviewed.  Patient came for follow-up he is doing well from cardiac point of view but recently had hematuria and was found to have kidney stone now scheduled for cystoscopy.  Cardiac medical history significant for coronary artery disease s/p non-ST elevation MI s/p stenting of mid circumflex.  He is doing well since then.  He is on aspirin.  And high intensity statins.  EKG shows sinus rhythm with RSR pattern/incomplete RBBB and Q waves inferior lead most likely pseudo infarct pattern.  No nausea no vomiting no other cardiovascular issues.  Recently he was started on Flomax.  He has blood test done in May 2023 cholesterol profile and electrolytes were stable.  No other issues vitals are stable today.    3/19/2024.    Above reviewed.  Patient came for follow-up.  He has medical history significant for coronary artery disease s/p non-ST elevation MI s/p stenting of the circumflex in 2020 now had an episode of chest pain.  He was in the emergency room in December 2023 with episode of chest pain troponins were negative.  He had a second episode.  He denies any shortness of breath.  And his EKG shows sinus them with incomplete RBBB it is not changed.  He is pretty compliant with his medication his vitals are stable his electrolytes are acceptable.   No nausea no vomiting no fever no chills.  He is working hard on losing weight.  Labs from December 2023 reviewed.    Review of Systems   Constitutional:  Negative for activity change, chills, diaphoresis, fever and unexpected weight change.   HENT:  Negative for congestion.    Eyes:  Negative for discharge and redness.   Respiratory:  Negative for cough, chest tightness, shortness of breath and wheezing.    Cardiovascular:  Positive for chest pain. Negative for palpitations and leg swelling.   Gastrointestinal:  Negative for abdominal pain, diarrhea and nausea.   Endocrine: Negative.    Genitourinary:  Negative for decreased urine volume and urgency.   Musculoskeletal: Negative.  Negative for arthralgias, back pain and gait problem.   Skin:  Negative for rash and wound.   Allergic/Immunologic: Negative.    Neurological:  Negative for dizziness, seizures, syncope, weakness, light-headedness and headaches.   Hematological: Negative.    Psychiatric/Behavioral:  Negative for agitation and confusion. The patient is nervous/anxious.        Historical Information   Past Medical History:   Diagnosis Date   • Cancer (HCC)     basal cell- facial cheek, chest   • Coronary artery disease    • Decreased hearing     bilateral   • Dry eyes    • Heart attack (HCC) 2020    1 stent   • Hematuria    • Kidney stone    • Myocardial infarction (HCC)     2020-one stent   • Spinal stenosis    • Wears glasses      Past Surgical History:   Procedure Laterality Date   • COLONOSCOPY     • CORONARY ANGIOPLASTY WITH STENT PLACEMENT  2020   • FL RETROGRADE PYELOGRAM  10/18/2023   • HERNIA REPAIR      umbilical, inguinal   • LIPOMA RESECTION      right shoulder   • HI CYSTO/URETERO W/LITHOTRIPSY &INDWELL STENT INSRT Right 10/18/2023    Procedure: CYSTOSCOPY URETEROSCOPY WITH LITHOTRIPSY HOLMIUM LASER, RETROGRADE PYELOGRAM AND INSERTION STENT URETERAL, URETHERAL DILATION;  Surgeon: Pérez Gonsalez MD;  Location: [facility] MAIN OR;  Service: Urology  "    Social History     Substance and Sexual Activity   Alcohol Use Yes   • Alcohol/week: 3.0 standard drinks of alcohol   • Types: 3 Glasses of wine per week    Comment: socially      Social History     Substance and Sexual Activity   Drug Use Yes   • Types: Marijuana    Comment: Gummies to help him sleep     Social History     Tobacco Use   Smoking Status Never   Smokeless Tobacco Never     Family History:   Family History   Problem Relation Age of Onset   • Dementia Mother    • Diabetes Father    • Peripheral vascular disease Father    • Diabetes Sister    • Diabetes Brother        Meds/Allergies     No Known Allergies    Current Outpatient Medications:   •  aspirin 81 mg chewable tablet, Chew 2 tablets (162 mg total) daily, Disp: 30 tablet, Rfl: 0  •  atorvastatin (LIPITOR) 80 mg tablet, TAKE 1 TABLET BY MOUTH EVERY DAY WITH DINNER, Disp: 30 tablet, Rfl: 2  •  metoprolol tartrate (LOPRESSOR) 25 mg tablet, TAKE 1 TABLET (25 MG TOTAL) BY MOUTH EVERY 12 (TWELVE) HOURS, Disp: 60 tablet, Rfl: 2  •  nitroglycerin (NITROSTAT) 0.4 mg SL tablet, Place 1 tablet (0.4 mg total) under the tongue every 5 (five) minutes as needed for chest pain, Disp: 60 tablet, Rfl: 0  •  Restasis 0.05 % ophthalmic emulsion, INSTILL 1 DROP IN BOTH EYES 2 TIMES DAILY, Disp: , Rfl:   •  tamsulosin (FLOMAX) 0.4 mg, Take 2 capsules (0.8 mg total) by mouth daily with dinner, Disp: 60 capsule, Rfl: 5  •  Tyrvaya 0.03 MG/ACT SOLN, 1 spray by Each Nare route 2 (two) times a day, Disp: , Rfl:   •  Cequa 0.09 % SOLN, INSTILL 1 DROP INTO BOTH EYES TWICE A DAY FOR 3 MONTHS, Disp: , Rfl:     Vitals: Blood pressure 138/98, pulse 75, height 5' 9\" (1.753 m), weight 98 kg (216 lb), SpO2 98%.  ?  Body mass index is 31.9 kg/m².  Vitals:    03/19/24 1330   Weight: 98 kg (216 lb)     BP Readings from Last 3 Encounters:   03/19/24 138/98   01/05/24 130/82   12/27/23 165/84       Physical Exam  Constitutional:       General: He is not in acute distress.     " Appearance: He is well-developed. He is not diaphoretic.   Neck:      Thyroid: No thyromegaly.      Vascular: No JVD.      Trachea: No tracheal deviation.   Cardiovascular:      Rate and Rhythm: Normal rate and regular rhythm.      Heart sounds: S1 normal and S2 normal. Heart sounds not distant. Murmur heard.      Systolic (ejection) murmur is present with a grade of 2/6.      No friction rub. No gallop. No S3 or S4 sounds.   Pulmonary:      Effort: Pulmonary effort is normal. No respiratory distress.      Breath sounds: Normal breath sounds. No wheezing or rales.   Chest:      Chest wall: No tenderness.   Abdominal:      General: Bowel sounds are normal. There is no distension.      Palpations: Abdomen is soft.      Tenderness: There is no abdominal tenderness.   Musculoskeletal:         General: No deformity.      Cervical back: Neck supple.   Skin:     General: Skin is warm and dry.      Coloration: Skin is not pale.      Findings: No rash.   Neurological:      Mental Status: He is alert and oriented to person, place, and time.   Psychiatric:         Behavior: Behavior normal.         Judgment: Judgment normal.           Diagnostic Studies Review Cardio:    Cardiac catheterization reviewed.    EKG:    Twelve lead EKG done 12/01/2020 shows normal sinus rhythm left axis deviation.  Heart rate 84 beats per minute no significant changes.    Twelve lead EKG 06/02/2021 shows normal sinus rhythm incomplete RBBB heart rate 66 beats per minute.  No change from previous EKG    Lead EKG done 12/14/2021 shows normal sinus rhythm incomplete RBBB heart rate 61 beats per minute.  Small Q-waves noted in inferior leads most likely pseudo infarct pattern no change from previous EKG.    Twelve lead EKG done on 06/14/2022 shows normal sinus rhythm incomplete RBBB heart rate 67 beats per minute no change from previous EKG.    Twelve-lead EKG 12/21/2022 shows normal sinus rhythm heart rate 80 bpm.  Incomplete RBBB no change from  previous EKG    Twelve-lead EKG done on 2023 shows normal sinus some heart rate 61 bpm Q inferior lead most like pseudoinfarction pattern include RBBB no change from previous EKG    Cardiac testing:   Results for orders placed during the hospital encounter of 20   Echo complete with contrast if indicated    Narrative [address] [address]  [phone]    Transthoracic Echocardiogram  2D, M-mode, Doppler, and Color Doppler    Study date:  2020    Patient: NARGIS JAUREGUI  MR number: [id number]  Account number: [id number]  : 1960  Age: 59 years  Gender: Male  Status: Outpatient  Location: Bedside  Height: 69 in  Weight: 209.7 lb  BP: 128/ 74 mmHg    Indications: Heart Failure    Diagnoses: I50.9 - Heart failure, unspecified    Sonographer:  CLAY Javed  Referring Physician:  Jose Rolle MD  Group:  [facility]  Interpreting Physician:  Prashant Gaston MD    SUMMARY    LEFT VENTRICLE:  Systolic function was normal. Ejection fraction was estimated in the range of 60 % to 65 %.  There were no regional wall motion abnormalities.    MITRAL VALVE:  There was trace regurgitation.    HISTORY: PRIOR HISTORY: Spinal Stenosis, HLD, Basal Cell Carcinoma    PROCEDURE: The procedure was performed at the bedside. This was a routine study. The transthoracic approach was used. The study included complete 2D imaging, M-mode, complete spectral Doppler, and color Doppler. The heart rate was 72 bpm,  at the start of the study. Image quality was adequate.    LEFT VENTRICLE: Size was normal. Systolic function was normal. Ejection fraction was estimated in the range of 60 % to 65 %. There were no regional wall motion abnormalities. Wall thickness was normal. No evidence of apical thrombus.  DOPPLER: Left ventricular diastolic function parameters were normal.    RIGHT VENTRICLE: The size was normal. Systolic function was normal with  TAPSE-1.9cm Wall thickness was normal.    LEFT ATRIUM: Size was normal.    RIGHT ATRIUM: Size was normal.    MITRAL VALVE: There was mild thickening. There was normal leaflet separation. DOPPLER: The transmitral velocity was within the normal range. There was no evidence for stenosis. There was trace regurgitation.    AORTIC VALVE: The valve was trileaflet. Leaflets exhibited normal thickness and normal cuspal separation. DOPPLER: Transaortic velocity was within the normal range. There was no evidence for stenosis. There was no significant  regurgitation.    TRICUSPID VALVE: The valve structure was normal. There was normal leaflet separation. DOPPLER: The transtricuspid velocity was within the normal range. There was no evidence for stenosis. There was no significant regurgitation.    PULMONIC VALVE: Leaflets exhibited normal thickness, no calcification, and normal cuspal separation. DOPPLER: The transpulmonic velocity was within the normal range. There was no significant regurgitation.    PERICARDIUM: There was no pericardial effusion. The pericardium was normal in appearance.    AORTA: The root exhibited normal size.    SYSTEMIC VEINS: IVC: The inferior vena cava was normal in size.    SYSTEM MEASUREMENT TABLES    2D  EF (Teich): 66.69 %    PW  MV E/A Ratio: 0.87    [employer] Commission Accredited Echocardiography Laboratory    Prepared and electronically signed by    Prashant Gaston MD  Signed 2020 13:25:07       No results found for this or any previous visit.  Results for orders placed during the hospital encounter of 20   Cardiac catheterization    [address] [address] [phone]    (Blankline)    Invasive Cardiovascular Lab Complete Report    Patient: NARGIS JAUREGUI  MR number: [id number]  Account number: [id number]  Study date: 2020  Gender: Male  : 1960  Height: 68.9 in  Weight: 206.4 lb  BSA: 2.09 mï¾²    Allergies: NO  KNOWN ALLERGIES    Diagnostic Cardiologist:  Mimi Young MD  Interventional Cardiologist:  Mimi Young MD  Primary Physician:  Danny Tyler MD    SUMMARY    CORONARY CIRCULATION:  Mid circumflex: There was a discrete 95 % stenosis.    1ST LESION INTERVENTIONS:  A drug-eluting stent procedure was performed on the 95 % lesion in the mid circumflex. Following intervention there was an excellent angiographic appearance with a 0 % residual stenosis.  A Xience Torie Rx 2.75 x 18mm drug-eluting stent was placed across the lesion and deployed at a maximum inflation pressure of 12 yong.    RECOMMENDATIONS:  Patient management should include statin therapy.  Patient management to include dual antiplatelet therapy.    INDICATIONS:  --  Coronary artery disease: significant (50% or greater) lesion in a major coronary artery.    PROCEDURES PERFORMED    --  Right coronary angiography.  --  Inpatient.  --  Mod Sedation Same Physician Initial 15min.  --  Coronary Catheterization (w/o [facility]).  --  Coronary Drug Eluting Stent w/PTCA.  --  Intervention on mid circumflex: drug-eluting stent.    PROCEDURE: The risks and alternatives of the procedures and conscious sedation were explained to the patient and informed consent was obtained. The patient was brought to the cath lab and placed on the table. The planned puncture sites  were prepped and draped in the usual sterile fashion.    --  Right radial artery access. After performing an Sandro's test to verify adequate ulnar artery supply to the hand, the radial site was prepped. The puncture site was infiltrated with local anesthetic. The vessel was accessed using the  modified Seldinger technique, a wire was advanced into the vessel, and a sheath was advanced over the wire into the vessel.    --  Right coronary artery angiography. A catheter was advanced over a guidewire into the aorta and positioned in the right coronary artery ostium under fluoroscopic guidance. Angiography  "was performed.    --  Inpatient.    --  Mod Sedation Same Physician Initial 15min.    --  Coronary Catheterization (w/o [facility]).    LESION INTERVENTION: A drug-eluting stent procedure was performed on the 95 % lesion in the mid circumflex. Following intervention there was an excellent angiographic appearance with a 0 % residual stenosis. This was a bifurcation lesion.  This was an ACC/AHA type A \"low risk\" lesion for intervention. There was no evidence of the transient no-reflow phenomenon. There was KEO 3 flow before the procedure and KEO 3 flow after the procedure.    --  A Xience Torie Rx 2.75 x 18mm drug-eluting stent was placed across the lesion and deployed at a maximum inflation pressure of 12 yong.    INTERVENTIONS:  --  Coronary Drug Eluting Stent w/PTCA.    PROCEDURE COMPLETION: The patient tolerated the procedure well and was discharged from the cath lab. TIMING: Test started at 08:52. Test concluded at 09:10. HEMOSTASIS: The sheath was removed. The site was compressed with a Hemoband  device. Hemostasis was obtained. MEDICATIONS GIVEN: 1% Lidocaine, 1 ml, subcutaneously, at 08:54. Baby Aspirin, 81 mg, PO, at 08:54. Effient, 10 mg, PO, at 08:54. Verapamil (5mg/2ml), 2.5 mg, IV, at 08:57. Heparin 1000 units/ml, 7,000  units, IV, at 08:57. Nitroglycerin (200mcg/ml), 200 mcg, at 08:58. Fentanyl (1OOmcg/2 ml), 50 mcg, IV, at 09:10. Versed (2mg/2ml), 2 mg, IV, at 09:10. 0.9 NSS, infusion rate of 75 ml/hr, IV, at 09:10.    CORONARY VESSELS:   --  Circumflex: The vessel was large sized. Angiography showed mild atherosclerosis.  --  Mid circumflex: The vessel was large sized. There was a discrete 95 % stenosis.    RECOMMENDATIONS  Patient management should include statin therapy.  Patient management to include dual antiplatelet therapy.    DISPOSITION:  The patient left the catheterization laboratory in stable condition.    Prepared and signed by    Mimi Young MD  Signed 11/24/2020 09:20:57    Study " "diagram    Angiographic findings  Native coronary lesions:  ï¾·Mid circumflex: Lesion 1: discrete, 95 % stenosis.  Intervention results  Native coronary lesions:  ï¾·drug-eluting stent of the 95 % stenosis in mid circumflex. Appearance excellent with 0 % residual stenosis. Stent: Xience Torie Rx 2.75 x 18mm drug-eluting.    Hemodynamic tables    Pressures:  Baseline  Pressures:  - HR: 74  Pressures:  - Rhythm:  Pressures:  -- Aortic Pressure (S/D/M): 122/76/41    Outputs:  Baseline  Outputs:  -- CALCULATIONS[age]  Outputs:  -- CALCULATIONS: Body Surface Area: 2.09  Outputs:  -- CALCULATIONS: Height in cm: 175.00  Outputs:  -- CALCULATIONS: Sex: Male  Outputs:  -- CALCULATIONS: Weight in k.80         Imaging:  Chest X-Ray:   No Chest XR results available for this patient.    CT-scan of the chest:     Cta Dissection Protocol Chest Abdomen Pelvis W Wo Contrast    Result Date: 2020  Impression 1.  No evidence of aortic dissection, intramural hematoma or aneurysm. 2.  Hepatic fatty infiltration. 3.  Mild sigmoid diverticulosis without acute diverticulitis. Workstation performed: [id number]     Lab Review   Lab Results   Component Value Date    WBC 9.19 2023    HGB 15.1 2023    HCT 44.4 2023    MCV 91 2023    RDW 13.2 2023     2023     BMP:  Lab Results   Component Value Date    SODIUM 140 2023    K 3.9 2023     2023    CO2 28 2023    BUN 16 2023    CREATININE 1.05 2023    GLUC 126 2023    GLUF 119 (H) 10/10/2023    CALCIUM 8.8 2023    CORRECTEDCA 9.0 2020    EGFR 76 2023    MG 2.4 2023     LFT:  Lab Results   Component Value Date    AST 16 2023    ALT 23 2023    ALKPHOS 74 2023    TP 6.7 2023    ALB 4.2 2023      No components found for: \"TSH3\"  Lab Results   Component Value Date    GEA6SYPFQMMA 1.670 2022     Lab Results   Component Value Date " "   HGBA1C 6.4 (H) 10/10/2023     Lipid Profile:   Lab Results   Component Value Date    CHOLESTEROL 126 10/10/2023    HDL 46 10/10/2023    LDLCALC 60 10/10/2023    TRIG 101 10/10/2023     Lab Results   Component Value Date    CHOLESTEROL 126 10/10/2023    CHOLESTEROL 136 05/17/2023     Lab Results   Component Value Date    TROPONINI 0.07 (H) 11/23/2020     Lab Results   Component Value Date    NTBNP 231 (H) 11/22/2020          Dr. Mimi Young MD [facility]      \"This note has been constructed using a voice recognition system.Therefore there may be syntax, spelling, and/or grammatical errors. Please call if you have any questions. \"  "

## 2024-04-12 ENCOUNTER — TELEPHONE (OUTPATIENT)
Dept: CARDIOLOGY CLINIC | Facility: CLINIC | Age: 64
End: 2024-04-12

## 2024-04-12 ENCOUNTER — HOSPITAL ENCOUNTER (OUTPATIENT)
Dept: NON INVASIVE DIAGNOSTICS | Facility: HOSPITAL | Age: 64
Discharge: HOME | End: 2024-04-12
Attending: INTERNAL MEDICINE
Payer: COMMERCIAL

## 2024-04-12 DIAGNOSIS — I25.10 CORONARY ARTERY DISEASE INVOLVING NATIVE CORONARY ARTERY OF NATIVE HEART WITHOUT ANGINA PECTORIS: ICD-10-CM

## 2024-04-12 DIAGNOSIS — R07.89 OTHER CHEST PAIN: ICD-10-CM

## 2024-04-12 DIAGNOSIS — Z87.442 HISTORY OF KIDNEY STONES: ICD-10-CM

## 2024-04-12 DIAGNOSIS — E66.9 OBESITY (BMI 30-39.9): ICD-10-CM

## 2024-04-12 DIAGNOSIS — E78.2 MIXED HYPERLIPIDEMIA: ICD-10-CM

## 2024-04-12 DIAGNOSIS — Z95.5 S/P CORONARY ARTERY STENT PLACEMENT: ICD-10-CM

## 2024-04-12 DIAGNOSIS — I11.9 HYPERTENSIVE HEART DISEASE WITHOUT HEART FAILURE: ICD-10-CM

## 2024-04-12 LAB
CHEST PAIN STATEMENT: NORMAL
MAX DIASTOLIC BP: 90 MMHG
MAX HR PERCENT: 104 %
MAX HR: 166 BPM
MAX PREDICTED HEART RATE: 159 BPM
PROTOCOL NAME: NORMAL
RATE PRESSURE PRODUCT: NORMAL
REASON FOR TERMINATION: NORMAL
SL CV STRESS RECOVERY BP: NORMAL MMHG
SL CV STRESS RECOVERY HR: 91 BPM
SL CV STRESS RECOVERY O2 SAT: 97 %
SL CV STRESS STAGE REACHED: 3
STRESS ANGINA INDEX: 0
STRESS BASELINE BP: NORMAL MMHG
STRESS BASELINE HR: 99 BPM
STRESS O2 SAT REST: 96 %
STRESS PEAK HR: 164 BPM
STRESS POST ESTIMATED WORKLOAD: 10.1 METS
STRESS POST EXERCISE DUR MIN: 9 MIN
STRESS POST EXERCISE DUR MIN: 9 MIN
STRESS POST EXERCISE DUR SEC: 1 SEC
STRESS POST O2 SAT PEAK: 97 %
STRESS POST PEAK BP: 210 MMHG
STRESS POST PEAK HR: 166 BPM
STRESS POST PEAK SYSTOLIC BP: 210 MMHG
TARGET HR FORMULA: NORMAL
TEST INDICATION: NORMAL

## 2024-04-12 PROCEDURE — 93017 CV STRESS TEST TRACING ONLY: CPT

## 2024-04-12 NOTE — TELEPHONE ENCOUNTER
----- Message from Mimi Young MD sent at 4/12/2024 12:27 PM EDT -----  Patient's stress test shows no high risk feature.  He has a good exercise capacity.  Patient may need stress test with imaging modality at some point.

## 2024-05-13 NOTE — PROGRESS NOTES
Sleep Medicine Outpatient Note   Beny Jackson 63 y.o. male MRN: [id number]  5/13/2024      Referring Physician: No ref. provider found    Reason for Consultation:  No chief complaint on file.      Assessment/Plan:    {There are no diagnoses linked to this encounter. (Refresh or delete this SmartLink)}      Health Maintenance  Immunization History   Administered Date(s) Administered    COVID-19 MODERNA VACC 0.5 ML IM 12/18/2021    INFLUENZA 11/01/2017, 10/17/2018, 11/12/2019, 10/17/2020, 11/19/2022    Pneumococcal Conjugate Vaccine 20-valent (Pcv20), Polysace 03/23/2023    Tdap 09/09/2018        No follow-ups on file.    History of Present Illness   HPI:  Beny Jackson is a 63 y.o. male who has a past medical history of hyperlipidemia, BPH    Sleep Pattern:  -Location:  -Bed/Recliner/Wedge:   -Bed Partner:   -HOB:   -# of pillows under head:   -Position:   -Bedtime:   -Lights out:   -Environmental:   -Latency:  -Awakenings:               -Reason:               -Duration:   -Fall back asleep easily:   -Wake time:               -Alarm:   -Rise time:   -Days off:  -Shift Work:  -Patient's estimate of total sleep time:  -Sleep aids/stimulants  -ETOH before sleeping  -Acid reflux     Daytime Symptoms:  -Upon Awakening:   -Daytime fatigue/sleepiness:   -Naps:   -Driving: Difficulty with sleepiness and driving:                -- Close calls related to sleepiness:               -- Accidents related to sleepiness:      Sleep Review of Symptoms:  -SDB  -snoring, gasping, choking during sleep:  -Parasomnias:  --Sleep Walking:   --Dream Enactment:   --Bruxism:   -Motor:  --RLS:   --PLMS:   - Narcolepsy:  --Hallucinations:   --Paralysis:   --Cataplexy:      Childhood Sleep History: none     Family History:  Family history of sleep disorders:      Questionnaires:   [place] is ***    Historical Information   Past Medical History:   Diagnosis Date    Cancer (HCC)     basal cell- facial cheek, chest    Coronary artery disease      Decreased hearing     bilateral    Dry eyes     Heart attack (HCC) 2020    1 stent    Hematuria     Kidney stone     Myocardial infarction ([facility])     2020-one stent    Spinal stenosis     Wears glasses      Past Surgical History:   Procedure Laterality Date    COLONOSCOPY      CORONARY ANGIOPLASTY WITH STENT PLACEMENT  2020    FL RETROGRADE PYELOGRAM  10/18/2023    HERNIA REPAIR      umbilical, inguinal    LIPOMA RESECTION      right shoulder    MO CYSTO/URETERO W/LITHOTRIPSY &INDWELL STENT INSRT Right 10/18/2023    Procedure: CYSTOSCOPY URETEROSCOPY WITH LITHOTRIPSY HOLMIUM LASER, RETROGRADE PYELOGRAM AND INSERTION STENT URETERAL, URETHERAL DILATION;  Surgeon: Pérez Gonsalez MD;  Location: [facility] MAIN OR;  Service: Urology     Family History   Problem Relation Age of Onset    Dementia Mother     Diabetes Father     Peripheral vascular disease Father     Diabetes Sister     Diabetes Brother          Meds/Allergies     Current Outpatient Medications:     aspirin 81 mg chewable tablet, Chew 2 tablets (162 mg total) daily, Disp: 30 tablet, Rfl: 0    atorvastatin (LIPITOR) 80 mg tablet, TAKE 1 TABLET BY MOUTH EVERY DAY WITH DINNER, Disp: 30 tablet, Rfl: 2    Cequa 0.09 % SOLN, INSTILL 1 DROP INTO BOTH EYES TWICE A DAY FOR 3 MONTHS, Disp: , Rfl:     metoprolol tartrate (LOPRESSOR) 25 mg tablet, TAKE 1 TABLET (25 MG TOTAL) BY MOUTH EVERY 12 (TWELVE) HOURS, Disp: 60 tablet, Rfl: 2    nitroglycerin (NITROSTAT) 0.4 mg SL tablet, Place 1 tablet (0.4 mg total) under the tongue every 5 (five) minutes as needed for chest pain, Disp: 60 tablet, Rfl: 0    Restasis 0.05 % ophthalmic emulsion, INSTILL 1 DROP IN BOTH EYES 2 TIMES DAILY, Disp: , Rfl:     tamsulosin (FLOMAX) 0.4 mg, Take 2 capsules (0.8 mg total) by mouth daily with dinner, Disp: 60 capsule, Rfl: 5    Tyrvaya 0.03 MG/ACT SOLN, 1 spray by Each Nare route 2 (two) times a day, Disp: , Rfl:   No Known Allergies    Vitals: There were no vitals taken for this visit. There is  "no height or weight on file to calculate BMI.        Physical Exam        Labs:   I have personally reviewed pertinent lab results.    ABG: No results found for: \"PHART\", \"CSY1HJZ\", \"PO2ART\", \"FLM8CXE\", \"W8SQEURA\", \"BEART\", \"SOURCE\",   BNP: No results found for: \"BNP\",   CBC:  Lab Results   Component Value Date    WBC 9.19 12/27/2023    HGB 15.1 12/27/2023    HCT 44.4 12/27/2023    MCV 91 12/27/2023     12/27/2023    EOSPCT 2 12/27/2023    EOSABS 0.19 12/27/2023    NEUTOPHILPCT 65 12/27/2023    LYMPHOPCT 24 12/27/2023   ,   CMP:   Lab Results   Component Value Date    SODIUM 140 12/27/2023    K 3.9 12/27/2023     12/27/2023    CO2 28 12/27/2023    BUN 16 12/27/2023    CREATININE 1.05 12/27/2023    CALCIUM 8.8 12/27/2023    AST 16 12/27/2023    ALT 23 12/27/2023    ALKPHOS 74 12/27/2023    EGFR 76 12/27/2023   ,   PT/INR: No results found for: \"PT\", \"INR\",   Ferrtin: No components found for: \"FERRTIN\",  Magensium: No results found for: \"MAGNESIUM\",      Imaging and other studies: I have personally reviewed pertinent reports.   and I have personally reviewed pertinent films in PACS    12/27/23 CXR - Linear atelectasis versus scarring    Sleep Study:      Transthoracic Echo:      Pulmonary Function Testing:        Emanuel Vance MD  Pulmonary, Critical Care and Sleep Medicine  [facility]     Portions of the record may have been created with voice recognition software. Occasional wrong word or \"sound a like\" substitutions may have occurred due to the inherent limitations of voice recognition software. Please read the chart carefully and recognize, using context, where substitutions have occurred.     "

## 2024-05-14 ENCOUNTER — OFFICE VISIT (OUTPATIENT)
Dept: SLEEP CENTER | Facility: CLINIC | Age: 64
End: 2024-05-14
Payer: COMMERCIAL

## 2024-05-14 ENCOUNTER — TELEPHONE (OUTPATIENT)
Dept: PSYCHIATRY | Facility: CLINIC | Age: 64
End: 2024-05-14

## 2024-05-14 VITALS
OXYGEN SATURATION: 95 % | WEIGHT: 213 LBS | HEART RATE: 73 BPM | SYSTOLIC BLOOD PRESSURE: 118 MMHG | BODY MASS INDEX: 31.55 KG/M2 | HEIGHT: 69 IN | DIASTOLIC BLOOD PRESSURE: 78 MMHG

## 2024-05-14 DIAGNOSIS — F51.01 PRIMARY INSOMNIA: Primary | ICD-10-CM

## 2024-05-14 PROCEDURE — 99204 OFFICE O/P NEW MOD 45 MIN: CPT | Performed by: INTERNAL MEDICINE

## 2024-05-14 RX ORDER — TRAZODONE HYDROCHLORIDE 50 MG/1
50 TABLET ORAL
Qty: 30 TABLET | Refills: 0 | Status: SHIPPED | OUTPATIENT
Start: 2024-05-14 | End: 2024-05-23

## 2024-05-14 NOTE — PROGRESS NOTES
Assessment and Plan:    1. Primary insomnia  Assessment & Plan:  Hx of insomnia - reports since adolescence   Falls asleep in 2-3 H, multiple wakes overnight - reports daytime sleepiness, tiredness. Denies snoring, morning HA, gasp, choke, muscle cramps and sleep paralysis.   Tried Melatonin - no benefit   Used Ambien in the past - stopped, experience sleep walking, no benefit   Recently using marijuana gummies, is cutting down use    Sleep hygiene discussed   Treatment options discussed   Referral to CBT-I   Information on Sleep hygiene and Insomnia management provided in handout/AVS   Can take Trazodone 50 mg 1 hour prior to bedtime - S/E discussed   Follow-up in 3-4 months     Orders:  -     traZODone (DESYREL) 50 mg tablet; Take 1 tablet (50 mg total) by mouth daily at bedtime  -     Ambulatory referral to Psych Services; Future             Tobacco and Toxic Substance Assessment and Intervention:     Tobacco use screening performed    Alcohol and drug use screening performed         HPI:     Beny Jackson is  63 y.o. male with history of NSTEMI s/p stents, BPH, and obesity, that arrives to clinic for evaluation of problem falling asleep and staying asleep. Patient reports long history of difficulty with sleep that goes back to when he was a teenager.     Sleep hx - Takes 2-3 H to fall asleep. In bed, 10-11p and out of bed 6-7a. Reports sleeps only 4-5 H. Does wake 3-4 x per night including to urinate. Denies symptoms suggestive of sleep paralysis or sleep hallucinations.  Denies  snoring, morning HA, gasp, and choke.  Does drink coffee x2 a day, last cup around 12-1pm.   Currently, managing with Marijuana gummies, which he is trying to not use due to discussion with his Cardiologist about data suggesting heart complications.   In the past used Melatonin  without benefit. Used to drink a few glasses of wine prior to sleep, to aid with sleep, but stopped all alcohol 3 weeks ago.  Many years ago, had been on Ambien,  but disconitnued due to feeling knocked out the next day, sleep walking and not feeling rested.   Active screen time in bed prior to sleep, with either TV or cellphone.      Lifestyle changes - Recently, noted he is 7 lb down as he is working on losing wt.          Patient has no other complaints at this time.      Subjective:    Review of Systems   Constitutional:  Positive for fatigue. Negative for chills and fever.   HENT:  Negative for ear pain and sore throat.    Eyes:  Negative for pain and visual disturbance.   Respiratory:  Negative for apnea, cough, choking and shortness of breath.    Cardiovascular:  Negative for chest pain and palpitations.   Gastrointestinal:  Negative for abdominal pain and vomiting.   Genitourinary:  Negative for dysuria and hematuria.        Nocturia   Musculoskeletal:  Negative for arthralgias and back pain.   Skin:  Negative for color change and rash.   Neurological:  Negative for seizures, syncope, light-headedness and headaches.   Psychiatric/Behavioral:  Positive for sleep disturbance.    All other systems reviewed and are negative.       Patient Active Problem List   Diagnosis   • Chest pain   • Hyperlipidemia   • NSTEMI (non-ST elevated myocardial infarction) (HCC)   • Elevated BP without diagnosis of hypertension   • Primary insomnia        Current Outpatient Medications   Medication Sig Dispense Refill   • aspirin 81 mg chewable tablet Chew 2 tablets (162 mg total) daily 30 tablet 0   • atorvastatin (LIPITOR) 80 mg tablet TAKE 1 TABLET BY MOUTH EVERY DAY WITH DINNER 30 tablet 2   • metoprolol tartrate (LOPRESSOR) 25 mg tablet TAKE 1 TABLET (25 MG TOTAL) BY MOUTH EVERY 12 (TWELVE) HOURS 60 tablet 2   • nitroglycerin (NITROSTAT) 0.4 mg SL tablet Place 1 tablet (0.4 mg total) under the tongue every 5 (five) minutes as needed for chest pain 60 tablet 0   • Restasis 0.05 % ophthalmic emulsion INSTILL 1 DROP IN BOTH EYES 2 TIMES DAILY     • tamsulosin (FLOMAX) 0.4 mg Take 2  "capsules (0.8 mg total) by mouth daily with dinner 60 capsule 5   • traZODone (DESYREL) 50 mg tablet Take 1 tablet (50 mg total) by mouth daily at bedtime 30 tablet 0   • Tyrvaya 0.03 MG/ACT SOLN 1 spray by Each Nare route 2 (two) times a day     • Cequa 0.09 % SOLN INSTILL 1 DROP INTO BOTH EYES TWICE A DAY FOR 3 MONTHS       No current facility-administered medications for this visit.        No Known Allergies     The following portions of the patient's history were reviewed and updated as appropriate: allergies, current medications, past family history, past medical history, past social history, past surgical history and problem list.             Objective:    /78   Pulse 73   Ht 5' 9\" (1.753 m)   Wt 96.6 kg (213 lb)   SpO2 95%   BMI 31.45 kg/m²      Body mass index is 31.45 kg/m².     Physical Exam  Vitals and nursing note reviewed.   Constitutional:       General: He is not in acute distress.     Appearance: Normal appearance. He is obese. He is not ill-appearing.   HENT:      Head: Normocephalic and atraumatic.      Mouth/Throat:      Mouth: Mucous membranes are moist.      Pharynx: Oropharynx is clear.   Eyes:      General: No scleral icterus.     Conjunctiva/sclera: Conjunctivae normal.   Cardiovascular:      Rate and Rhythm: Normal rate and regular rhythm.      Pulses: Normal pulses.      Heart sounds: No murmur heard.     No gallop.   Pulmonary:      Effort: Pulmonary effort is normal. No respiratory distress.      Breath sounds: Normal breath sounds. No wheezing or rales.   Musculoskeletal:         General: Normal range of motion.      Cervical back: Normal range of motion and neck supple. No tenderness.   Skin:     General: Skin is warm and dry.      Capillary Refill: Capillary refill takes less than 2 seconds.      Coloration: Skin is not jaundiced or pale.   Neurological:      Mental Status: He is alert and oriented to person, place, and time. Mental status is at baseline.   Psychiatric:    "      Mood and Affect: Mood normal.         Behavior: Behavior normal.            Javier Foster MD  Internal Medicine PGY-3

## 2024-05-14 NOTE — ASSESSMENT & PLAN NOTE
Hx of insomnia - reports since adolescence   Falls asleep in 2-3 H, multiple wakes overnight - reports daytime sleepiness, tiredness. Denies snoring, morning HA, gasp, choke, muscle cramps and sleep paralysis.   Tried Melatonin - no benefit   Used Ambien in the past - stopped, experience sleep walking, no benefit   Recently using marijuana gummies, is cutting down use    Sleep hygiene discussed   Treatment options discussed   Referral to CBT-I   Information on Sleep hygiene and Insomnia management provided in handout/AVS   Can take Trazodone 50 mg 1 hour prior to bedtime - S/E discussed   Follow-up in 3-4 months

## 2024-05-14 NOTE — PATIENT INSTRUCTIONS
Strategies for improving sleep:    Keep a consistent bedtime and wake up time.  This will lead to a more regular sleep schedule and avoid periods of sleep deprivation or periods of extended wakefulness during the night.    Sleep in a colder environment.  Bedroom temperatures may need to be below 70 degrees Fahrenheit to help with decreasing body temperature.  The brain usually calms in colder temperatures.  Taking a hot shower/bath before bedtime may help with decreasing internal body temperature.      Avoid watching TV in bed.  If needing a form of media to help with sleep - can try sleep aid podcasts such Sleep With Me - a free podcast that helps with sleep initiation    Avoid napping, especially naps lasting longer than 1 hour or naps late in the day, which will likely affect your ability to fall asleep that night.    Limit caffeine/sugar, avoiding caffeine after lunch to allow it to get out of your system and not affect your ability to fall asleep or the quality of your sleep.  I usually recommend avoiding caffeine/sugar at least 6 hours before bedstime    Limit alcohol, alcohol can be sedating but also activating as it metabolizes, causing you to awaken from sleep earlier than desired.  It can affect the quality of your sleep by not letting you get into the more refreshing stages of sleep.    Avoid nicotine, of course not smoking or vaping at all is best, but nicotine is a stimulant and should be avoided near bedtime and during the night    Exercise and daytime physical activity is encouraged, in particular 4-6 hours before bedtime, as this may help you to fall asleep more easily and quality of sleep is improved.  Rigorous exercise within 3 hours of bedtime is discouraged.    Keep the sleep environment quiet and dark - Noise and light exposure during the night can disrupt sleep.  White noise or ear plugs are often recommended to reduce noise.  Using black out shades or an eye mask is commonly recommended to  "reduce light.  This also includes avoiding exposure to television or technology near bedtime, as this can have an impact on circadian rhythms by shifting sleep time later.    Bedroom clock - Avoid checking the time at night  This includes alarm clocks and other time pieces such as watches and phones.  Checking the time increases cognitive arousal and prolongs wakefulness.    Evening eating - Avoid eating close to bedtime which can cause acid reflux and unwanted weight gain, but don't go to bed hungry.  Eat a healthy and filling meal in the evening without over-eating and avoid late night snacks.    Be mindful of your medications - some medications if taken closer to bedtime can cause insomnia.  These medications include Effexor, Cymbalta, Wellbutrin, Metoprolol, Albuterol and others.  Of course, medications are important for other medical issues so a discussion needs to be had with your prescribing physician before changing medications on your own.     Avoid excessive time awake in bed:  Before going to bed, decreasing stressful thoughts is key to quieting the brain.  That's easier than done.  Some strategies to decrease these thoughts include a typical wind down routine (reading a book, hot shower/bath, calming TV show in the living room etc.), specified worry time (writing down your worries in a journal 2-3 hours before bed), progressive muscle relaxation ([employer] has some good tutorials on insomnia muscle relaxation), meditation.      With great difficulty falling asleep or with difficulty falling back asleep, laying in bed for a prolonged period time is not ideal.  This can increase worry and rumination (having racing thoughts, not able to \"turn off your brain\".  During the first part of the night - avoid going to bed unless you are drowsy.  Sometimes we go to bed because we feel like it's the \"right time\" but our brains aren't ready for sleeping.  This may paradoxically lead to more insomnia as you stay awake " in bed waiting to go to sleep.  If you are able to fall asleep under 30 minutes of closing your eyes with the lights off, that is reasonable.  If not, then maybe you need to get out of bed.  After what feels like 30 minutes, if you feel wide awake, worried, anxious, or can't clear your brain, it is better to leave the bedroom to do something relaxing , The typical recommendation is to read a boring book in dim light.  In general, if you leave the room, you want to do something that is relaxing, not stimulating. Avoid bright screens, doing work, doing chores, or anything that requires a lot of mental effort. Return to bed when you feel more calm, sleepy, or mentally clear.      It is common in insomnia to look at the time at night to see what time it is, how long you have been awake, etc.  However, this can negatively affect sleep. I strongly recommend avoiding looking at the time at night.  Here are some ways to do this  1) Turn the clock around so you cannot see the time at night  2) Avoid wearing a watch to bed.    3) Leave your phone on the other side of the room so you cannot look at it at night  4) Set an alarm if you need to wake up in he morning. If you have not heard the alarm, assume it is still time to sleep.    If you practice this consistently, sleep quality and anxiety regarding sleep may improve.      There are some on-line resources that do require a fee that can be of help.  Some of which will require a fee.    Http://www.[employer].va.gov/apps/insomnia/index.html#dashboard (FREE)  Http://[employer]/cbt-online-insomnia-treatment.html  Http://www.[employer]/    Some apps that have helped people sleep:  Insomnia  (FREE)  Calm  CBT-I   Go! To Sleep by the [facility]  Sleep

## 2024-05-23 DIAGNOSIS — F51.01 PRIMARY INSOMNIA: Primary | ICD-10-CM

## 2024-05-23 RX ORDER — TRAZODONE HYDROCHLORIDE 100 MG/1
100 TABLET ORAL
Qty: 90 TABLET | Refills: 1 | Status: SHIPPED | OUTPATIENT
Start: 2024-05-23

## 2024-05-24 ENCOUNTER — TELEPHONE (OUTPATIENT)
Dept: BEHAVIORAL/MENTAL HEALTH CLINIC | Facility: CLINIC | Age: 64
End: 2024-05-24

## 2024-05-24 DIAGNOSIS — I21.4 NSTEMI (NON-ST ELEVATION MYOCARDIAL INFARCTION) (HCC): ICD-10-CM

## 2024-05-24 RX ORDER — ATORVASTATIN CALCIUM 80 MG/1
TABLET, FILM COATED ORAL
Qty: 90 TABLET | Refills: 1 | Status: SHIPPED | OUTPATIENT
Start: 2024-05-24

## 2024-05-24 NOTE — TELEPHONE ENCOUNTER
Writer spoke with patient about CBT-I sleep therapy.  Patient is unable to due virtual due to him living in [place] and provider is in [place].  States he cannot do the in person session either

## 2024-07-23 DIAGNOSIS — N13.8 BPH WITH OBSTRUCTION/LOWER URINARY TRACT SYMPTOMS: ICD-10-CM

## 2024-07-23 DIAGNOSIS — N40.1 BPH WITH OBSTRUCTION/LOWER URINARY TRACT SYMPTOMS: ICD-10-CM

## 2024-07-24 RX ORDER — TAMSULOSIN HYDROCHLORIDE 0.4 MG/1
0.8 CAPSULE ORAL
Qty: 60 CAPSULE | Refills: 0 | Status: SHIPPED | OUTPATIENT
Start: 2024-07-24

## 2024-08-19 DIAGNOSIS — N40.1 BPH WITH OBSTRUCTION/LOWER URINARY TRACT SYMPTOMS: ICD-10-CM

## 2024-08-19 DIAGNOSIS — N13.8 BPH WITH OBSTRUCTION/LOWER URINARY TRACT SYMPTOMS: ICD-10-CM

## 2024-08-19 RX ORDER — TAMSULOSIN HYDROCHLORIDE 0.4 MG/1
0.8 CAPSULE ORAL
Qty: 60 CAPSULE | Refills: 0 | Status: SHIPPED | OUTPATIENT
Start: 2024-08-19

## 2024-08-19 NOTE — TELEPHONE ENCOUNTER
Refill must be reviewed and completed by the office or provider. The refill is unable to be approved or denied by the medication management team.    Patient last seen > year

## 2024-08-26 NOTE — PROGRESS NOTES
Sleep Medicine Outpatient Follow Up Note   Beny Jackson 63 y.o. male MRN: [id number]  8/27/2024      Reason for Consultation:    Chief Complaint   Patient presents with    Insomnia       Assessment/Plan:    1. Primary insomnia  Assessment & Plan:  Long history of primary insomnia since adolescence.  Difficulties with sleep onset and sleep maintenance with multiple awakenings overnight associated with daytime sleepiness and impaired daytime function.    Tried melatonin in the past with no benefit.  Was prescribed Ambien in the past but had sleepwalking.    Having some benefit with trazodone, improving sleep to up to 5 hours per night on average.  Daytime symptoms are improving and he feels a lot better during the day.    No significant side effects, I advised him to increase trazodone to 100 mg and see how he feels.  He does not have to take this dose every night.  He can adjust his dose nightly as there is no significant withdrawal from decreasing or stopping trazodone.    Unfortunately from a logistic standpoint he was not able to do CBT-I.  He was not eligible for virtual CBT-I due to insurance coverage.  I gave him resources on the [facility] virtual CBT-I that he can do for free.    Follow-up in 6 months      Health Maintenance  Immunization History   Administered Date(s) Administered    COVID-19 MODERNA VACC 0.5 ML IM 12/18/2021    INFLUENZA 11/01/2017, 10/17/2018, 11/12/2019, 10/17/2020, 11/19/2022    Pneumococcal Conjugate Vaccine 20-valent (Pcv20), Polysace 03/23/2023    Tdap 09/09/2018        Return in about 6 months (around 2/27/2025).    History of Present Illness   HPI:  Beny Jackson is a 63 y.o. male who has a past medical history of CAD, BPH, kidney stones, who is presenting for follow up of insomnia.     8/27/24 - FU with me - he is on 50mg trazodone and getting about 5 hours of sleep.  No significant side effects.  No falls.  He stopped taking metoprolol since his heart rate is better.   He wakes up around 6am to go to bathroom and cannot go back to sleep.  He feels better with more hours of sleep.  Fl[place]ada 5    5/14/24 - Consult with me - Patient notes that he has had trouble sleeping and staying asleep pretty much his entire life.  He used to be more delayed sleep phase in his adolescence but currently sleeps between 10 PM-7AM.  He has difficulty falling into sleep and usually goes to bed at the same time of his significant other who falls asleep very easily.  He has several awakenings overnight, contributed by his BPH and nocturia.  He is able to fall back into sleep frequently but has occasional sleep maintenance issues.    Prescribed trazodone, referred to CBT-I. Parasomnia history with Ambien     He usually will watch TV or be on his phone before going to bed.  When he goes to bed around the same time of his significant other he time he tends to stay awake sometimes for more than an hour after his significant other is falling asleep.  He was asked to be evaluated by his significant other given his chronic sleep issues.  When he is unable to fall asleep he will lie there in bed trying to sleep.  Sometimes he is not drowsy when he is going to bed.     Sometimes he only achieves 3 hours of sleep in a given night and will be very tired the next day.  He tends to avoid naps unless he has had multiple days of sleep deprivation.     Denies any snoring, choking/gasping during sleep.  No periodic limb movements noted by spouse.  No restless legs.  No parasomnias.     He has tried melatonin in the past but stopped due to ineffectiveness.  He has tried Ambien in the past but this caused sleepwalking     He works from home.  He maintains a regular wake-up time with an alarm around 7 AM on the weekdays and little bit later on weekends.    Meds/Allergies     Current Outpatient Medications:     aspirin 81 mg chewable tablet, Chew 2 tablets (162 mg total) daily, Disp: 30 tablet, Rfl: 0    atorvastatin  "(LIPITOR) 80 mg tablet, TAKE 1 TABLET BY MOUTH EVERY DAY WITH DINNER, Disp: 90 tablet, Rfl: 1    nitroglycerin (NITROSTAT) 0.4 mg SL tablet, Place 1 tablet (0.4 mg total) under the tongue every 5 (five) minutes as needed for chest pain, Disp: 60 tablet, Rfl: 0    Restasis 0.05 % ophthalmic emulsion, INSTILL 1 DROP IN BOTH EYES 2 TIMES DAILY, Disp: , Rfl:     tamsulosin (FLOMAX) 0.4 mg, TAKE 2 CAPSULES BY MOUTH DAILY WITH DINNER.., Disp: 60 capsule, Rfl: 0    traZODone (DESYREL) 100 mg tablet, Take 1 tablet (100 mg total) by mouth daily at bedtime, Disp: 90 tablet, Rfl: 1    Tyrvaya 0.03 MG/ACT SOLN, 1 spray by Each Nare route 2 (two) times a day, Disp: , Rfl:     Cequa 0.09 % SOLN, INSTILL 1 DROP INTO BOTH EYES TWICE A DAY FOR 3 MONTHS, Disp: , Rfl:     metoprolol tartrate (LOPRESSOR) 25 mg tablet, TAKE 1 TABLET (25 MG TOTAL) BY MOUTH EVERY 12 (TWELVE) HOURS (Patient not taking: Reported on 8/27/2024), Disp: 180 tablet, Rfl: 1  No Known Allergies    Vitals: Blood pressure 130/80, pulse 66, height 5' 9\" (1.753 m), weight 92.4 kg (203 lb 12.8 oz), SpO2 97%. Body mass index is 30.1 kg/m². Oxygen Therapy  SpO2: 97 %    Physical Exam  Vitals and nursing note reviewed.   Constitutional:       General: He is not in acute distress.     Appearance: Normal appearance. He is well-developed. He is not ill-appearing, toxic-appearing or diaphoretic.   HENT:      Head: Normocephalic and atraumatic.   Eyes:      Conjunctiva/sclera: Conjunctivae normal.   Cardiovascular:      Rate and Rhythm: Normal rate and regular rhythm.      Heart sounds: Normal heart sounds. No murmur heard.  Pulmonary:      Effort: Pulmonary effort is normal. No respiratory distress.   Abdominal:      Tenderness: There is no guarding.   Musculoskeletal:      Cervical back: Normal range of motion. No rigidity.   Neurological:      General: No focal deficit present.      Mental Status: He is alert and oriented to person, place, and time. Mental status is at " "baseline.   Psychiatric:         Mood and Affect: Mood normal.             Labs:   I have personally reviewed pertinent lab results.    ABG: No results found for: \"PHART\", \"XRC7WRH\", \"PO2ART\", \"LWN3GXU\", \"A9IQSSII\", \"BEART\", \"SOURCE\",   CBC:  Lab Results   Component Value Date    WBC 9.19 12/27/2023    HGB 15.1 12/27/2023    HCT 44.4 12/27/2023    MCV 91 12/27/2023     12/27/2023    EOSPCT 2 12/27/2023    EOSABS 0.19 12/27/2023    NEUTOPHILPCT 65 12/27/2023    LYMPHOPCT 24 12/27/2023   ,   CMP:   Lab Results   Component Value Date    SODIUM 140 12/27/2023    K 3.9 12/27/2023     12/27/2023    CO2 28 12/27/2023    BUN 16 12/27/2023    CREATININE 1.05 12/27/2023    CALCIUM 8.8 12/27/2023    AST 16 12/27/2023    ALT 23 12/27/2023    ALKPHOS 74 12/27/2023    EGFR 76 12/27/2023   ,   Ferrtin: No components found for: \"FERRTIN\",  Magensium: No results found for: \"MAGNESIUM\",    Imaging and other studies: I have personally reviewed pertinent reports.   and I have personally reviewed pertinent films in PACS    Sleep Study: none    Transthoracic Echo:  11/2020  Normal LVEF  Normal RV    Pulmonary Function Testing:    Emanuel Vance MD  Pulmonary, Critical Care and Sleep Medicine  [facility]     Portions of the record may have been created with voice recognition software. Occasional wrong word or \"sound a like\" substitutions may have occurred due to the inherent limitations of voice recognition software. Please read the chart carefully and recognize, using context, where substitutions have occurred.     "

## 2024-08-27 ENCOUNTER — OFFICE VISIT (OUTPATIENT)
Dept: SLEEP CENTER | Facility: CLINIC | Age: 64
End: 2024-08-27
Payer: COMMERCIAL

## 2024-08-27 VITALS
HEIGHT: 69 IN | SYSTOLIC BLOOD PRESSURE: 130 MMHG | BODY MASS INDEX: 30.18 KG/M2 | OXYGEN SATURATION: 97 % | HEART RATE: 66 BPM | WEIGHT: 203.8 LBS | DIASTOLIC BLOOD PRESSURE: 80 MMHG

## 2024-08-27 DIAGNOSIS — F51.01 PRIMARY INSOMNIA: Primary | ICD-10-CM

## 2024-08-27 PROCEDURE — 99214 OFFICE O/P EST MOD 30 MIN: CPT | Performed by: INTERNAL MEDICINE

## 2024-08-27 NOTE — ASSESSMENT & PLAN NOTE
Long history of primary insomnia since adolescence.  Difficulties with sleep onset and sleep maintenance with multiple awakenings overnight associated with daytime sleepiness and impaired daytime function.    Tried melatonin in the past with no benefit.  Was prescribed Ambien in the past but had sleepwalking.    Having some benefit with trazodone, improving sleep to up to 5 hours per night on average.  Daytime symptoms are improving and he feels a lot better during the day.    No significant side effects, I advised him to increase trazodone to 100 mg and see how he feels.  He does not have to take this dose every night.  He can adjust his dose nightly as there is no significant withdrawal from decreasing or stopping trazodone.    Unfortunately from a logistic standpoint he was not able to do CBT-I.  He was not eligible for virtual CBT-I due to insurance coverage.  I gave him resources on the [facility] virtual CBT-I that he can do for free.    Follow-up in 6 months

## 2024-08-27 NOTE — PATIENT INSTRUCTIONS
Strategies for improving sleep:    Keep a consistent bedtime and wake up time.  This will lead to a more regular sleep schedule and avoid periods of sleep deprivation or periods of extended wakefulness during the night.    Sleep in a colder environment.  Bedroom temperatures may need to be below 70 degrees Fahrenheit to help with decreasing body temperature.  The brain usually calms in colder temperatures.  Taking a hot shower/bath before bedtime may help with decreasing internal body temperature.      Avoid watching TV in bed.  If needing a form of media to help with sleep - can try sleep aid podcasts such Sleep With Me - a free podcast that helps with sleep initiation    Avoid napping, especially naps lasting longer than 1 hour or naps late in the day, which will likely affect your ability to fall asleep that night.    Limit caffeine/sugar, avoiding caffeine after lunch to allow it to get out of your system and not affect your ability to fall asleep or the quality of your sleep.  I usually recommend avoiding caffeine/sugar at least 6 hours before bedstime    Limit alcohol, alcohol can be sedating but also activating as it metabolizes, causing you to awaken from sleep earlier than desired.  It can affect the quality of your sleep by not letting you get into the more refreshing stages of sleep.    Avoid nicotine, of course not smoking or vaping at all is best, but nicotine is a stimulant and should be avoided near bedtime and during the night    Exercise and daytime physical activity is encouraged, in particular 4-6 hours before bedtime, as this may help you to fall asleep more easily and quality of sleep is improved.  Rigorous exercise within 3 hours of bedtime is discouraged.    Keep the sleep environment quiet and dark - Noise and light exposure during the night can disrupt sleep.  White noise or ear plugs are often recommended to reduce noise.  Using black out shades or an eye mask is commonly recommended to  "reduce light.  This also includes avoiding exposure to television or technology near bedtime, as this can have an impact on circadian rhythms by shifting sleep time later.    Bedroom clock - Avoid checking the time at night  This includes alarm clocks and other time pieces such as watches and phones.  Checking the time increases cognitive arousal and prolongs wakefulness.    Evening eating - Avoid eating close to bedtime which can cause acid reflux and unwanted weight gain, but don't go to bed hungry.  Eat a healthy and filling meal in the evening without over-eating and avoid late night snacks.    Be mindful of your medications - some medications if taken closer to bedtime can cause insomnia.  These medications include Effexor, Cymbalta, Wellbutrin, Metoprolol, Albuterol and others.  Of course, medications are important for other medical issues so a discussion needs to be had with your prescribing physician before changing medications on your own.     Avoid excessive time awake in bed:  Before going to bed, decreasing stressful thoughts is key to quieting the brain.  That's easier than done.  Some strategies to decrease these thoughts include a typical wind down routine (reading a book, hot shower/bath, calming TV show in the living room etc.), specified worry time (writing down your worries in a journal 2-3 hours before bed), progressive muscle relaxation ("[employer] has some good tutorials on insomnia muscle relaxation), meditation.      With great difficulty falling asleep or with difficulty falling back asleep, laying in bed for a prolonged period time is not ideal.  This can increase worry and rumination (having racing thoughts, not able to \"turn off your brain\".  During the first part of the night - avoid going to bed unless you are drowsy.  Sometimes we go to bed because we feel like it's the \"right time\" but our brains aren't ready for sleeping.  This may paradoxically lead to more insomnia as you stay awake " "in bed waiting to go to sleep.  If you are able to fall asleep under 30 minutes of closing your eyes with the lights off, that is reasonable.  If not, then maybe you need to get out of bed.  After what feels like 30 minutes, if you feel wide awake, worried, anxious, or can't clear your brain, it is better to leave the bedroom to do something relaxing , The typical recommendation is to read a boring book in dim light.  In general, if you leave the room, you want to do something that is relaxing, not stimulating. Avoid bright screens, doing work, doing chores, or anything that requires a lot of mental effort. Return to bed when you feel more calm, sleepy, or mentally clear.      It is common in insomnia to look at the time at night to see what time it is, how long you have been awake, etc.  However, this can negatively affect sleep. I strongly recommend avoiding looking at the time at night.  Here are some ways to do this  1) Turn the clock around so you cannot see the time at night  2) Avoid wearing a watch to bed.    3) Leave your phone on the other side of the room so you cannot look at it at night  4) Set an alarm if you need to wake up in he morning. If you have not heard the alarm, assume it is still time to sleep.    If you practice this consistently, sleep quality and anxiety regarding sleep may improve.      There are some on-line resources that do require a fee that can be of help.  Some of which will require a fee.    Http://www.veterantraining.[place].gov/apps/insomnia/index.html#dashboard (FREE)  Http://[employer]/cbt-online-insomnia-treatment.html  Http://www.[employer]u/    Some apps that have helped people sleep:  Insomnia  (FREE)  Calm  CBT-I   Go! To Sleep by the [facility]  Sleepio    You may consider reading \"No More Sleepless Nights\" by Dr. Steve Guillen and Dr. Alyssia Woodard, \"Say Good Night to Insomnia\" by Dr. Robby Ortiz, \"Quiet Your Mind and Get to Sleep\" by Amber.   " These books can help you to fall asleep without the use of medications over time.

## 2024-09-26 ENCOUNTER — TELEPHONE (OUTPATIENT)
Age: 64
End: 2024-09-26

## 2024-09-26 NOTE — TELEPHONE ENCOUNTER
"Hello,    The following message was sent via e-mail to the leadership team:     Please advise if you can help facilitate the following overbook request:    Patient Name: Beny Jackson    Patient MRN: [id number]    Call back #: [phone]    Insurance: [facility]    Department:Cardiology    Speciality: General Cardiology    Reason for overbook request: PATIENT REQUEST    Comments (Write \"N/a\" if no comments): Pt told to schedule for 6m f/u (Sept) & first available is May 2025. All APPs refused    Requested doctor and location: [facility]    Date of current appointment: 05/12/25      Thank you.       "

## 2024-11-17 DIAGNOSIS — F51.01 PRIMARY INSOMNIA: ICD-10-CM

## 2024-11-17 DIAGNOSIS — I21.4 NSTEMI (NON-ST ELEVATION MYOCARDIAL INFARCTION) (HCC): ICD-10-CM

## 2024-11-18 RX ORDER — TRAZODONE HYDROCHLORIDE 100 MG/1
100 TABLET ORAL
Qty: 30 TABLET | Refills: 5 | Status: SHIPPED | OUTPATIENT
Start: 2024-11-18

## 2024-11-18 RX ORDER — ATORVASTATIN CALCIUM 80 MG/1
80 TABLET, FILM COATED ORAL
Qty: 30 TABLET | Refills: 0 | Status: SHIPPED | OUTPATIENT
Start: 2024-11-18

## 2024-11-18 NOTE — TELEPHONE ENCOUNTER
Last office visit 8/27/2024.  Next office visit 3/25/2025.    Dr. Vance, please review Rx request and sign if appropriate.  Thank you.

## 2024-11-19 ENCOUNTER — OFFICE VISIT (OUTPATIENT)
Dept: CARDIOLOGY CLINIC | Facility: CLINIC | Age: 64
End: 2024-11-19
Payer: COMMERCIAL

## 2024-11-19 VITALS
HEIGHT: 69 IN | OXYGEN SATURATION: 98 % | HEART RATE: 77 BPM | WEIGHT: 209 LBS | DIASTOLIC BLOOD PRESSURE: 80 MMHG | BODY MASS INDEX: 30.96 KG/M2 | SYSTOLIC BLOOD PRESSURE: 140 MMHG

## 2024-11-19 DIAGNOSIS — E78.2 MIXED HYPERLIPIDEMIA: ICD-10-CM

## 2024-11-19 DIAGNOSIS — Z95.5 S/P CORONARY ARTERY STENT PLACEMENT: ICD-10-CM

## 2024-11-19 DIAGNOSIS — I11.9 HYPERTENSIVE HEART DISEASE WITHOUT HEART FAILURE: ICD-10-CM

## 2024-11-19 DIAGNOSIS — R06.09 DOE (DYSPNEA ON EXERTION): ICD-10-CM

## 2024-11-19 DIAGNOSIS — R94.39 ABNORMAL STRESS ECG WITH TREADMILL: ICD-10-CM

## 2024-11-19 DIAGNOSIS — E66.9 OBESITY (BMI 30-39.9): ICD-10-CM

## 2024-11-19 DIAGNOSIS — I25.10 CORONARY ARTERY DISEASE INVOLVING NATIVE CORONARY ARTERY OF NATIVE HEART WITHOUT ANGINA PECTORIS: ICD-10-CM

## 2024-11-19 DIAGNOSIS — Z87.442 HISTORY OF KIDNEY STONES: ICD-10-CM

## 2024-11-19 PROCEDURE — 99214 OFFICE O/P EST MOD 30 MIN: CPT | Performed by: INTERNAL MEDICINE

## 2024-11-19 PROCEDURE — 93000 ELECTROCARDIOGRAM COMPLETE: CPT | Performed by: INTERNAL MEDICINE

## 2024-11-19 NOTE — PROGRESS NOTES
Progress Note - Cardiology Office  [facility]    Beny Jackson 63 y.o. male MRN: [id number]  : 1960  Encounter: [id number]      Assessment:     1. CONTRERAS (dyspnea on exertion)    2. Coronary artery disease involving native coronary artery of native heart without angina pectoris    3. Hypertensive heart disease without heart failure    4. S/P coronary artery stent placement    5. Mixed hyperlipidemia    6. Obesity (BMI 30-39.9)    7. History of kidney stones    8. Abnormal stress ECG with treadmill        Discussion Summary and Plan:    1.   Coronary artery disease status post non ST elevation MI status post stenting of LCX with drug coated stent.    All issues related to drug coated stent discusse discussed with patient.  He has completed dual platelet therapy for more than 18 months.  He had a stress EKG test done which was abnormal he had some EKG changes.  He still have some exertional shortness of breath he will be scheduled for nuclear stress test.    2. Dyslipidemia.  Intensity statin blood test in May 2023 acceptable he will get his yearly blood test with his primary team electrolyte was stable in 2023.  Joe CMP CBC lipid profile and TSH ordered.     3. Essential hypertension.  Patient blood pressure is pretty well controlled with low-dose metoprolol.  Heart rate is acceptable.  Currently he is on metoprolol 25 every 12 hourly.  Same medications.      4. Obesity with BMI around 31. He has gained some weight since last visit.  That could cause some with exertion shortness of breath advised him to lose weight.  Patient want to join exercise program.     5.  History of hematuria and kidney stone.  Mesh management as per urology.    6.  Abnormal stress EKG with a episode of chest pain with history of anxiety.  He had a flat horizontal ST depression in the inferior and lateral precordial leads.  With history of CAD, anxiety and risk factor he will be scheduled for nuclear  stress test as he wanted to join exercise program.  He agreed with that plan.    Further plan results of these test become available.    Please call [phone] if any questions.  Counseling :  A description of the counseling.  Goals and Barriers.  Patient's ability to self care: Yes  Medication side effect reviewed with patient in detail and all their questions answered to their satisfaction.    HPI :     Beny Jackson is a 63 y.o. year old male who came for follow up. Patient hasMedical history significant for hypertension dyslipidemia was recently admitted to [facility] with chest pain and underwent cardiac catheterization found to have significant stenosis of mid circumflex.  Which was successfully stented with drug-eluting stent.  He came for follow-up he is doing well denies any chest pain any shortness of breath no nausea no vomiting no fever no chills no PND no orthopnea no other issues at this time.        7/13/2023.    Above reviewed.  Patient came for follow-up he is doing well from cardiac point of view but recently had hematuria and was found to have kidney stone now scheduled for cystoscopy.  Cardiac medical history significant for coronary artery disease s/p non-ST elevation MI s/p stenting of mid circumflex.  He is doing well since then.  He is on aspirin.  And high intensity statins.  EKG shows sinus rhythm with RSR pattern/incomplete RBBB and Q waves inferior lead most likely pseudo infarct pattern.  No nausea no vomiting no other cardiovascular issues.  Recently he was started on Flomax.  He has blood test done in May 2023 cholesterol profile and electrolytes were stable.  No other issues vitals are stable today.    3/19/2024.    Above reviewed.  Patient came for follow-up.  He has medical history significant for coronary artery disease s/p non-ST elevation MI s/p stenting of the circumflex in 2020 now had an episode of chest pain.  He was in the emergency room in December 2023  with episode of chest pain troponins were negative.  He had a second episode.  He denies any shortness of breath.  And his EKG shows sinus them with incomplete RBBB it is not changed.  He is pretty compliant with his medication his vitals are stable his electrolytes are acceptable.  No nausea no vomiting no fever no chills.  He is working hard on losing weight.  Labs from December 2023 reviewed.    11/19/2024.    Above reviewed.  Patient came for follow-up.  He had a history of CAD s/p non-ST elevation MI s/p stenting of circumflex in 2020, history of dyslipidemia, history of abnormal EKG with RBBB who came for follow-up.  He was having on and off episodes of chest pain.  He underwent exercise stress test in April 2024 which showed patient has horizontal ST depression in load 2 3 aVF V5 V6.  Patient did exercised for 9 minutes and hypertensive response to exercise was noted.  His last blood test was in December 2023.  He still have some exertional shortness of breath particularly when he does some activities and he has noted it has been getting little hard for him his EKG shows sinus them with heart rate 77 bpm incomplete R bundle branch block.  He otherwise feels well.  No nausea no vomiting no fever no chills no other issues.    Review of Systems   Constitutional:  Negative for activity change, chills, diaphoresis, fever and unexpected weight change.   HENT:  Negative for congestion.    Eyes:  Negative for discharge and redness.   Respiratory:  Positive for shortness of breath. Negative for cough, chest tightness and wheezing.         Mostly exertional.   Cardiovascular: Negative.  Negative for chest pain, palpitations and leg swelling.   Gastrointestinal:  Negative for abdominal pain, diarrhea and nausea.   Endocrine: Negative.    Genitourinary:  Negative for decreased urine volume and urgency.   Musculoskeletal: Negative.  Negative for arthralgias, back pain and gait problem.   Skin:  Negative for rash and  wound.   Allergic/Immunologic: Negative.    Neurological:  Negative for dizziness, seizures, syncope, weakness, light-headedness and headaches.   Hematological: Negative.    Psychiatric/Behavioral:  Negative for agitation and confusion. The patient is not nervous/anxious.        Historical Information   Past Medical History:   Diagnosis Date    Cancer (HCC)     basal cell- facial cheek, chest    Coronary artery disease     Decreased hearing     bilateral    Dry eyes     Heart attack (HCC) 2020    1 stent    Hematuria     Kidney stone     Myocardial infarction (HCC)     2020-one stent    Spinal stenosis     Wears glasses      Past Surgical History:   Procedure Laterality Date    COLONOSCOPY      CORONARY ANGIOPLASTY WITH STENT PLACEMENT  2020    FL RETROGRADE PYELOGRAM  10/18/2023    HERNIA REPAIR      umbilical, inguinal    LIPOMA RESECTION      right shoulder    NE CYSTO/URETERO W/LITHOTRIPSY &INDWELL STENT INSRT Right 10/18/2023    Procedure: CYSTOSCOPY URETEROSCOPY WITH LITHOTRIPSY HOLMIUM LASER, RETROGRADE PYELOGRAM AND INSERTION STENT URETERAL, URETHERAL DILATION;  Surgeon: Pérez Gonsalez MD;  Location: [facility] MAIN OR;  Service: Urology     Social History     Substance and Sexual Activity   Alcohol Use Yes    Alcohol/week: 3.0 standard drinks of alcohol    Types: 3 Glasses of wine per week    Comment: socially      Social History     Substance and Sexual Activity   Drug Use Yes    Types: Marijuana    Comment: Gummies to help him sleep     Social History     Tobacco Use   Smoking Status Never   Smokeless Tobacco Never     Family History:   Family History   Problem Relation Age of Onset    Dementia Mother     Diabetes Father     Peripheral vascular disease Father     Diabetes Sister     Diabetes Brother        Meds/Allergies     No Known Allergies    Current Outpatient Medications:     aspirin 81 mg chewable tablet, Chew 2 tablets (162 mg total) daily, Disp: 30 tablet, Rfl: 0    atorvastatin (LIPITOR) 80 mg  "tablet, TAKE 1 TABLET BY MOUTH EVERY DAY WITH DINNER, Disp: 30 tablet, Rfl: 0    nitroglycerin (NITROSTAT) 0.4 mg SL tablet, Place 1 tablet (0.4 mg total) under the tongue every 5 (five) minutes as needed for chest pain, Disp: 60 tablet, Rfl: 0    Restasis 0.05 % ophthalmic emulsion, INSTILL 1 DROP IN BOTH EYES 2 TIMES DAILY, Disp: , Rfl:     tamsulosin (FLOMAX) 0.4 mg, TAKE 2 CAPSULES BY MOUTH DAILY WITH DINNER.., Disp: 60 capsule, Rfl: 0    traZODone (DESYREL) 100 mg tablet, TAKE 1 TABLET BY MOUTH DAILY AT BEDTIME, Disp: 30 tablet, Rfl: 5    Tyrvaya 0.03 MG/ACT SOLN, 1 spray by Each Nare route 2 (two) times a day, Disp: , Rfl:     Cequa 0.09 % SOLN, INSTILL 1 DROP INTO BOTH EYES TWICE A DAY FOR 3 MONTHS, Disp: , Rfl:     metoprolol tartrate (LOPRESSOR) 25 mg tablet, TAKE 1 TABLET (25 MG TOTAL) BY MOUTH EVERY 12 (TWELVE) HOURS (Patient not taking: Reported on 11/19/2024), Disp: 180 tablet, Rfl: 1    Vitals: Blood pressure 140/80, pulse 77, height 5' 9\" (1.753 m), weight 94.8 kg (209 lb), SpO2 98%.    Body mass index is 30.86 kg/m².  Vitals:    11/19/24 1400   Weight: 94.8 kg (209 lb)       BP Readings from Last 3 Encounters:   11/19/24 140/80   08/27/24 130/80   05/14/24 118/78       Physical Exam  Constitutional:       General: He is not in acute distress.     Appearance: He is well-developed. He is not diaphoretic.   Neck:      Thyroid: No thyromegaly.      Vascular: No JVD.      Trachea: No tracheal deviation.   Cardiovascular:      Rate and Rhythm: Normal rate and regular rhythm.      Heart sounds: S1 normal and S2 normal. Heart sounds not distant. Murmur heard.      Systolic (ejection) murmur is present with a grade of 2/6.      No friction rub. No gallop. No S3 or S4 sounds.   Pulmonary:      Effort: Pulmonary effort is normal. No respiratory distress.      Breath sounds: Normal breath sounds. No wheezing or rales.   Chest:      Chest wall: No tenderness.   Abdominal:      General: Bowel sounds are normal. " There is no distension.      Palpations: Abdomen is soft.      Tenderness: There is no abdominal tenderness.   Musculoskeletal:         General: No deformity.      Cervical back: Neck supple.   Skin:     General: Skin is warm and dry.      Coloration: Skin is not pale.      Findings: No rash.   Neurological:      Mental Status: He is alert and oriented to person, place, and time.   Psychiatric:         Behavior: Behavior normal.         Judgment: Judgment normal.           Diagnostic Studies Review Cardio:    Cardiac catheterization reviewed.    Exercise stress test done in April 2024 was abnormal.  Patient has 1 to 1.5 mm ST depression which quickly recovered in recovery.  Hypertensive response to exercise was noted patient has no symptoms of chest pain.    EKG:    Twelve lead EKG done 12/01/2020 shows normal sinus rhythm left axis deviation.  Heart rate 84 beats per minute no significant changes.    Twelve lead EKG 06/02/2021 shows normal sinus rhythm incomplete RBBB heart rate 66 beats per minute.  No change from previous EKG    Lead EKG done 12/14/2021 shows normal sinus rhythm incomplete RBBB heart rate 61 beats per minute.  Small Q-waves noted in inferior leads most likely pseudo infarct pattern no change from previous EKG.    Twelve lead EKG done on 06/14/2022 shows normal sinus rhythm incomplete RBBB heart rate 67 beats per minute no change from previous EKG.    Twelve-lead EKG 12/21/2022 shows normal sinus rhythm heart rate 80 bpm.  Incomplete RBBB no change from previous EKG    Twelve-lead EKG done on 7/13/2023 shows normal sinus some heart rate 61 bpm Q inferior lead most like pseudoinfarction pattern include RBBB no change from previous EKG    Twelve-lead EKG done on 11/19/2024 normal sinus rhythm heart rate 77 bpm incomplete RBBB we will take care of for LVH.    Cardiac testing:   Results for orders placed during the hospital encounter of 11/22/20   Echo complete with contrast if indicated    Narrative  [address] [address]  [phone]    Transthoracic Echocardiogram  2D, M-mode, Doppler, and Color Doppler    Study date:  2020    Patient: NARGIS JAUREGUI  MR number: [id number]  Account number: [id number]  : 1960  Age: 59 years  Gender: Male  Status: Outpatient  Location: Bedside  Height: 69 in  Weight: 209.7 lb  BP: 128/ 74 mmHg    Indications: Heart Failure    Diagnoses: I50.9 - Heart failure, unspecified    Sonographer:  CLAY Javed  Referring Physician:  Jose Rolle MD  Group:  [facility]  Interpreting Physician:  Prashant Gaston MD    SUMMARY    LEFT VENTRICLE:  Systolic function was normal. Ejection fraction was estimated in the range of 60 % to 65 %.  There were no regional wall motion abnormalities.    MITRAL VALVE:  There was trace regurgitation.    HISTORY: PRIOR HISTORY: Spinal Stenosis, HLD, Basal Cell Carcinoma    PROCEDURE: The procedure was performed at the bedside. This was a routine study. The transthoracic approach was used. The study included complete 2D imaging, M-mode, complete spectral Doppler, and color Doppler. The heart rate was 72 bpm,  at the start of the study. Image quality was adequate.    LEFT VENTRICLE: Size was normal. Systolic function was normal. Ejection fraction was estimated in the range of 60 % to 65 %. There were no regional wall motion abnormalities. Wall thickness was normal. No evidence of apical thrombus.  DOPPLER: Left ventricular diastolic function parameters were normal.    RIGHT VENTRICLE: The size was normal. Systolic function was normal with TAPSE-1.9cm Wall thickness was normal.    LEFT ATRIUM: Size was normal.    RIGHT ATRIUM: Size was normal.    MITRAL VALVE: There was mild thickening. There was normal leaflet separation. DOPPLER: The transmitral velocity was within the normal range. There was no evidence for stenosis. There was trace regurgitation.    AORTIC VALVE:  The valve was trileaflet. Leaflets exhibited normal thickness and normal cuspal separation. DOPPLER: Transaortic velocity was within the normal range. There was no evidence for stenosis. There was no significant  regurgitation.    TRICUSPID VALVE: The valve structure was normal. There was normal leaflet separation. DOPPLER: The transtricuspid velocity was within the normal range. There was no evidence for stenosis. There was no significant regurgitation.    PULMONIC VALVE: Leaflets exhibited normal thickness, no calcification, and normal cuspal separation. DOPPLER: The transpulmonic velocity was within the normal range. There was no significant regurgitation.    PERICARDIUM: There was no pericardial effusion. The pericardium was normal in appearance.    AORTA: The root exhibited normal size.    SYSTEMIC VEINS: IVC: The inferior vena cava was normal in size.    SYSTEM MEASUREMENT TABLES    2D  EF (Teich): 66.69 %    PW  MV E/A Ratio: 0.87    [facility] Accredited Echocardiography Laboratory    Prepared and electronically signed by    Prashant Gaston MD  Signed 2020 13:25:07       No results found for this or any previous visit.  Results for orders placed during the hospital encounter of 20   Cardiac catheterization    [address] [id number] [phone]    (Blankline)    Invasive Cardiovascular Lab Complete Report    Patient: NARGIS JAUREGUI  MR number: [id number]  Account number: [id number]  Study date: 2020  Gender: Male  : 1960  Height: 68.9 in  Weight: 206.4 lb  BSA: 2.09 mï¾²    Allergies: NO KNOWN ALLERGIES    Diagnostic Cardiologist:  Mimi Young MD  Interventional Cardiologist:  Mimi Young MD  Primary Physician:  Danny Tyler MD    SUMMARY    CORONARY CIRCULATION:  Mid circumflex: There was a discrete 95 % stenosis.    1ST LESION INTERVENTIONS:  A drug-eluting stent procedure was performed on the 95 % lesion  in the mid circumflex. Following intervention there was an excellent angiographic appearance with a 0 % residual stenosis.  A Xience Torie Rx 2.75 x 18mm drug-eluting stent was placed across the lesion and deployed at a maximum inflation pressure of 12 yong.    RECOMMENDATIONS:  Patient management should include statin therapy.  Patient management to include dual antiplatelet therapy.    INDICATIONS:  --  Coronary artery disease: significant (50% or greater) lesion in a major coronary artery.    PROCEDURES PERFORMED    --  Right coronary angiography.  --  Inpatient.  --  Mod Sedation Same Physician Initial 15min.  --  Coronary Catheterization (w/o [facility]).  --  Coronary Drug Eluting Stent w/PTCA.  --  Intervention on mid circumflex: drug-eluting stent.    PROCEDURE: The risks and alternatives of the procedures and conscious sedation were explained to the patient and informed consent was obtained. The patient was brought to the cath lab and placed on the table. The planned puncture sites  were prepped and draped in the usual sterile fashion.    --  Right radial artery access. After performing an Sandro's test to verify adequate ulnar artery supply to the hand, the radial site was prepped. The puncture site was infiltrated with local anesthetic. The vessel was accessed using the  modified Seldinger technique, a wire was advanced into the vessel, and a sheath was advanced over the wire into the vessel.    --  Right coronary artery angiography. A catheter was advanced over a guidewire into the aorta and positioned in the right coronary artery ostium under fluoroscopic guidance. Angiography was performed.    --  Inpatient.    --  Mod Sedation Same Physician Initial 15min.    --  Coronary Catheterization (w/o [facility]).    LESION INTERVENTION: A drug-eluting stent procedure was performed on the 95 % lesion in the mid circumflex. Following intervention there was an excellent angiographic appearance with a 0 % residual stenosis.  "This was a bifurcation lesion.  This was an ACC/AHA type A \"low risk\" lesion for intervention. There was no evidence of the transient no-reflow phenomenon. There was KEO 3 flow before the procedure and KEO 3 flow after the procedure.    --  A Xience Torie Rx 2.75 x 18mm drug-eluting stent was placed across the lesion and deployed at a maximum inflation pressure of 12 yong.    INTERVENTIONS:  --  Coronary Drug Eluting Stent w/PTCA.    PROCEDURE COMPLETION: The patient tolerated the procedure well and was discharged from the cath lab. TIMING: Test started at 08:52. Test concluded at 09:10. HEMOSTASIS: The sheath was removed. The site was compressed with a Hemoband  device. Hemostasis was obtained. MEDICATIONS GIVEN: 1% Lidocaine, 1 ml, subcutaneously, at 08:54. Baby Aspirin, 81 mg, PO, at 08:54. Effient, 10 mg, PO, at 08:54. Verapamil (5mg/2ml), 2.5 mg, IV, at 08:57. Heparin 1000 units/ml, 7,000  units, IV, at 08:57. Nitroglycerin (200mcg/ml), 200 mcg, at 08:58. Fentanyl (1OOmcg/2 ml), 50 mcg, IV, at 09:10. Versed (2mg/2ml), 2 mg, IV, at 09:10. 0.9 NSS, infusion rate of 75 ml/hr, IV, at 09:10.    CORONARY VESSELS:   --  Circumflex: The vessel was large sized. Angiography showed mild atherosclerosis.  --  Mid circumflex: The vessel was large sized. There was a discrete 95 % stenosis.    RECOMMENDATIONS  Patient management should include statin therapy.  Patient management to include dual antiplatelet therapy.    DISPOSITION:  The patient left the catheterization laboratory in stable condition.    Prepared and signed by    Mimi Young MD  Signed 11/24/2020 09:20:57    Study diagram    Angiographic findings  Native coronary lesions:  ï¾·Mid circumflex: Lesion 1: discrete, 95 % stenosis.  Intervention results  Native coronary lesions:  ï¾·drug-eluting stent of the 95 % stenosis in mid circumflex. Appearance excellent with 0 % residual stenosis. Stent: Xience Torie Rx 2.75 x 18mm drug-eluting.    Hemodynamic " "tables    Pressures:  Baseline  Pressures:  - HR: 74  Pressures:  - Rhythm:  Pressures:  -- Aortic Pressure (S/D/M): 122/76/41    Outputs:  Baseline  Outputs:  -- CALCULATIONS[age]  Outputs:  -- CALCULATIONS: Body Surface Area: 2.09  Outputs:  -- CALCULATIONS: Height in cm: 175.00  Outputs:  -- CALCULATIONS: Sex: Male  Outputs:  -- CALCULATIONS: Weight in k.80         Imaging:  Chest X-Ray:   No Chest XR results available for this patient.    CT-scan of the chest:     Cta Dissection Protocol Chest Abdomen Pelvis W Wo Contrast    Result Date: 2020  Impression 1.  No evidence of aortic dissection, intramural hematoma or aneurysm. 2.  Hepatic fatty infiltration. 3.  Mild sigmoid diverticulosis without acute diverticulitis. Workstation performed: [id number]     Lab Review   Lab Results   Component Value Date    WBC 9.19 2023    HGB 15.1 2023    HCT 44.4 2023    MCV 91 2023    RDW 13.2 2023     2023     BMP:  Lab Results   Component Value Date    SODIUM 140 2023    K 3.9 2023     2023    CO2 28 2023    BUN 16 2023    CREATININE 1.05 2023    GLUC 126 2023    GLUF 119 (H) 10/10/2023    CALCIUM 8.8 2023    CORRECTEDCA 9.0 2020    EGFR 76 2023    MG 2.4 2023     LFT:  Lab Results   Component Value Date    AST 16 2023    ALT 23 2023    ALKPHOS 74 2023    TP 6.7 2023    ALB 4.2 2023      No components found for: \"TSH3\"  Lab Results   Component Value Date    EYT0ACVJOWZE 1.670 2022     Lab Results   Component Value Date    HGBA1C 6.4 (H) 10/10/2023     Lipid Profile:   Lab Results   Component Value Date    CHOLESTEROL 126 10/10/2023    HDL 46 10/10/2023    LDLCALC 60 10/10/2023    TRIG 101 10/10/2023     Lab Results   Component Value Date    CHOLESTEROL 126 10/10/2023    CHOLESTEROL 136 2023     Lab Results   Component Value Date    TROPONINI 0.07 " "(H) 11/23/2020     Lab Results   Component Value Date    NTBNP 231 (H) 11/22/2020          Dr. Mimi Young MD [facility]      \"This note has been constructed using a voice recognition system.Therefore there may be syntax, spelling, and/or grammatical errors. Please call if you have any questions. \"  "

## 2024-11-23 DIAGNOSIS — N13.8 BPH WITH OBSTRUCTION/LOWER URINARY TRACT SYMPTOMS: ICD-10-CM

## 2024-11-23 DIAGNOSIS — N40.1 BPH WITH OBSTRUCTION/LOWER URINARY TRACT SYMPTOMS: ICD-10-CM

## 2024-11-26 ENCOUNTER — APPOINTMENT (OUTPATIENT)
Dept: LAB | Facility: CLINIC | Age: 64
End: 2024-11-26
Payer: COMMERCIAL

## 2024-11-26 DIAGNOSIS — I25.10 CORONARY ARTERY DISEASE INVOLVING NATIVE CORONARY ARTERY OF NATIVE HEART WITHOUT ANGINA PECTORIS: ICD-10-CM

## 2024-11-26 DIAGNOSIS — I11.9 HYPERTENSIVE HEART DISEASE WITHOUT HEART FAILURE: ICD-10-CM

## 2024-11-26 DIAGNOSIS — E78.2 MIXED HYPERLIPIDEMIA: ICD-10-CM

## 2024-11-26 DIAGNOSIS — R06.09 DOE (DYSPNEA ON EXERTION): ICD-10-CM

## 2024-11-26 DIAGNOSIS — Z87.442 HISTORY OF KIDNEY STONES: ICD-10-CM

## 2024-11-26 DIAGNOSIS — Z95.5 S/P CORONARY ARTERY STENT PLACEMENT: ICD-10-CM

## 2024-11-26 DIAGNOSIS — E66.9 OBESITY (BMI 30-39.9): ICD-10-CM

## 2024-11-26 DIAGNOSIS — R94.39 ABNORMAL STRESS ECG WITH TREADMILL: ICD-10-CM

## 2024-11-26 LAB
ALBUMIN SERPL BCG-MCNC: 4.3 G/DL (ref 3.5–5)
ALP SERPL-CCNC: 63 U/L (ref 34–104)
ALT SERPL W P-5'-P-CCNC: 19 U/L (ref 7–52)
ANION GAP SERPL CALCULATED.3IONS-SCNC: 3 MMOL/L (ref 4–13)
AST SERPL W P-5'-P-CCNC: 16 U/L (ref 13–39)
BASOPHILS # BLD AUTO: 0.07 THOUSANDS/ΜL (ref 0–0.1)
BASOPHILS NFR BLD AUTO: 1 % (ref 0–1)
BILIRUB SERPL-MCNC: 0.83 MG/DL (ref 0.2–1)
BUN SERPL-MCNC: 15 MG/DL (ref 5–25)
CALCIUM SERPL-MCNC: 9.1 MG/DL (ref 8.4–10.2)
CHLORIDE SERPL-SCNC: 106 MMOL/L (ref 96–108)
CHOLEST SERPL-MCNC: 124 MG/DL (ref ?–200)
CO2 SERPL-SCNC: 30 MMOL/L (ref 21–32)
CREAT SERPL-MCNC: 0.91 MG/DL (ref 0.6–1.3)
EOSINOPHIL # BLD AUTO: 0.16 THOUSAND/ΜL (ref 0–0.61)
EOSINOPHIL NFR BLD AUTO: 2 % (ref 0–6)
ERYTHROCYTE [DISTWIDTH] IN BLOOD BY AUTOMATED COUNT: 12.5 % (ref 11.6–15.1)
GFR SERPL CREATININE-BSD FRML MDRD: 90 ML/MIN/1.73SQ M
GLUCOSE P FAST SERPL-MCNC: 110 MG/DL (ref 65–99)
HCT VFR BLD AUTO: 46.2 % (ref 36.5–49.3)
HDLC SERPL-MCNC: 46 MG/DL
HGB BLD-MCNC: 16 G/DL (ref 12–17)
IMM GRANULOCYTES # BLD AUTO: 0.03 THOUSAND/UL (ref 0–0.2)
IMM GRANULOCYTES NFR BLD AUTO: 0 % (ref 0–2)
LDLC SERPL CALC-MCNC: 65 MG/DL (ref 0–100)
LYMPHOCYTES # BLD AUTO: 1.57 THOUSANDS/ΜL (ref 0.6–4.47)
LYMPHOCYTES NFR BLD AUTO: 22 % (ref 14–44)
MCH RBC QN AUTO: 31.7 PG (ref 26.8–34.3)
MCHC RBC AUTO-ENTMCNC: 34.6 G/DL (ref 31.4–37.4)
MCV RBC AUTO: 92 FL (ref 82–98)
MONOCYTES # BLD AUTO: 0.66 THOUSAND/ΜL (ref 0.17–1.22)
MONOCYTES NFR BLD AUTO: 9 % (ref 4–12)
NEUTROPHILS # BLD AUTO: 4.51 THOUSANDS/ΜL (ref 1.85–7.62)
NEUTS SEG NFR BLD AUTO: 66 % (ref 43–75)
NONHDLC SERPL-MCNC: 78 MG/DL
NRBC BLD AUTO-RTO: 0 /100 WBCS
PLATELET # BLD AUTO: 288 THOUSANDS/UL (ref 149–390)
PMV BLD AUTO: 9.7 FL (ref 8.9–12.7)
POTASSIUM SERPL-SCNC: 4.6 MMOL/L (ref 3.5–5.3)
PROT SERPL-MCNC: 6.7 G/DL (ref 6.4–8.4)
RBC # BLD AUTO: 5.05 MILLION/UL (ref 3.88–5.62)
SODIUM SERPL-SCNC: 139 MMOL/L (ref 135–147)
TRIGL SERPL-MCNC: 63 MG/DL (ref ?–150)
TSH SERPL DL<=0.05 MIU/L-ACNC: 1.26 UIU/ML (ref 0.45–4.5)
WBC # BLD AUTO: 7 THOUSAND/UL (ref 4.31–10.16)

## 2024-11-26 PROCEDURE — 84443 ASSAY THYROID STIM HORMONE: CPT

## 2024-11-26 PROCEDURE — 80053 COMPREHEN METABOLIC PANEL: CPT

## 2024-11-26 PROCEDURE — 85025 COMPLETE CBC W/AUTO DIFF WBC: CPT

## 2024-11-26 PROCEDURE — 80061 LIPID PANEL: CPT

## 2024-11-26 PROCEDURE — 36415 COLL VENOUS BLD VENIPUNCTURE: CPT

## 2024-11-26 RX ORDER — TAMSULOSIN HYDROCHLORIDE 0.4 MG/1
0.8 CAPSULE ORAL
Qty: 60 CAPSULE | Refills: 0 | Status: SHIPPED | OUTPATIENT
Start: 2024-11-26

## 2024-11-26 NOTE — TELEPHONE ENCOUNTER
Patient called asking for the update on his refill and I notified the patient that he was overdue for an appt and a refill would not be completed until that was done. Patient was then transferred to the Scheduling Line where he scheduled an appt for 11/29/2024. Please review to see if the refill is appropriate.

## 2024-11-27 ENCOUNTER — RESULTS FOLLOW-UP (OUTPATIENT)
Dept: CARDIOLOGY CLINIC | Facility: CLINIC | Age: 64
End: 2024-11-27

## 2024-11-27 NOTE — TELEPHONE ENCOUNTER
----- Message from Mimi Young MD sent at 11/27/2024 10:10 AM EST -----  Patient blood test reviewed.  They are acceptable.  Will continue same medication.  Patient should keep his appointment for follow-up.

## 2024-12-03 ENCOUNTER — HOSPITAL ENCOUNTER (OUTPATIENT)
Dept: RADIOLOGY | Facility: HOSPITAL | Age: 64
Discharge: HOME | End: 2024-12-03
Attending: INTERNAL MEDICINE
Payer: COMMERCIAL

## 2024-12-03 ENCOUNTER — HOSPITAL ENCOUNTER (OUTPATIENT)
Dept: NON INVASIVE DIAGNOSTICS | Facility: HOSPITAL | Age: 64
Discharge: HOME | End: 2024-12-03
Attending: INTERNAL MEDICINE
Payer: COMMERCIAL

## 2024-12-03 VITALS
OXYGEN SATURATION: 99 % | DIASTOLIC BLOOD PRESSURE: 80 MMHG | SYSTOLIC BLOOD PRESSURE: 130 MMHG | RESPIRATION RATE: 20 BRPM | HEART RATE: 87 BPM

## 2024-12-03 DIAGNOSIS — E66.9 OBESITY (BMI 30-39.9): ICD-10-CM

## 2024-12-03 DIAGNOSIS — E78.2 MIXED HYPERLIPIDEMIA: ICD-10-CM

## 2024-12-03 DIAGNOSIS — R94.39 ABNORMAL STRESS ECG WITH TREADMILL: ICD-10-CM

## 2024-12-03 DIAGNOSIS — R06.09 DOE (DYSPNEA ON EXERTION): ICD-10-CM

## 2024-12-03 DIAGNOSIS — I11.9 HYPERTENSIVE HEART DISEASE WITHOUT HEART FAILURE: ICD-10-CM

## 2024-12-03 DIAGNOSIS — I25.10 CORONARY ARTERY DISEASE INVOLVING NATIVE CORONARY ARTERY OF NATIVE HEART WITHOUT ANGINA PECTORIS: ICD-10-CM

## 2024-12-03 DIAGNOSIS — Z95.5 S/P CORONARY ARTERY STENT PLACEMENT: ICD-10-CM

## 2024-12-03 DIAGNOSIS — Z87.442 HISTORY OF KIDNEY STONES: ICD-10-CM

## 2024-12-03 LAB
MAX HR PERCENT: 106 %
MAX HR: 169 BPM
NUC STRESS EJECTION FRACTION: 71 %
RATE PRESSURE PRODUCT: NORMAL
SL CV REST NUCLEAR ISOTOPE DOSE: 11 MCI
SL CV STRESS NUCLEAR ISOTOPE DOSE: 33 MCI
SL CV STRESS RECOVERY BP: NORMAL MMHG
SL CV STRESS RECOVERY HR: 101 BPM
SL CV STRESS RECOVERY O2 SAT: 97 %
SL CV STRESS STAGE REACHED: 4
STRESS ANGINA INDEX: 0
STRESS BASELINE BP: NORMAL MMHG
STRESS BASELINE HR: 87 BPM
STRESS O2 SAT REST: 99 %
STRESS PEAK HR: 166 BPM
STRESS POST ESTIMATED WORKLOAD: 13.4 METS
STRESS POST EXERCISE DUR MIN: 10 MIN
STRESS POST O2 SAT PEAK: 97 %
STRESS POST PEAK BP: 200 MMHG

## 2024-12-03 PROCEDURE — 93017 CV STRESS TEST TRACING ONLY: CPT

## 2024-12-03 PROCEDURE — 93016 CV STRESS TEST SUPVJ ONLY: CPT | Performed by: INTERNAL MEDICINE

## 2024-12-03 PROCEDURE — 78452 HT MUSCLE IMAGE SPECT MULT: CPT | Performed by: INTERNAL MEDICINE

## 2024-12-03 PROCEDURE — 78452 HT MUSCLE IMAGE SPECT MULT: CPT

## 2024-12-03 PROCEDURE — 93018 CV STRESS TEST I&R ONLY: CPT | Performed by: INTERNAL MEDICINE

## 2024-12-03 PROCEDURE — A9502 TC99M TETROFOSMIN: HCPCS

## 2024-12-04 NOTE — TELEPHONE ENCOUNTER
----- Message from Mimi Young MD sent at 12/4/2024  8:54 AM EST -----  Pt's Patient's stress test is normal.   Patient can keep regular appointment.    Please call patient with the result.

## 2024-12-05 LAB
CHEST PAIN STATEMENT: NORMAL
MAX DIASTOLIC BP: 90 MMHG
MAX PREDICTED HEART RATE: 158 BPM
PROTOCOL NAME: NORMAL
REASON FOR TERMINATION: NORMAL
STRESS POST EXERCISE DUR MIN: 10 MIN
STRESS POST EXERCISE DUR SEC: 4 SEC
STRESS POST PEAK HR: 169 BPM
STRESS POST PEAK SYSTOLIC BP: 200 MMHG
TARGET HR FORMULA: NORMAL

## 2024-12-13 ENCOUNTER — OFFICE VISIT (OUTPATIENT)
Dept: UROLOGY | Facility: CLINIC | Age: 64
End: 2024-12-13
Payer: COMMERCIAL

## 2024-12-13 VITALS
DIASTOLIC BLOOD PRESSURE: 80 MMHG | OXYGEN SATURATION: 98 % | BODY MASS INDEX: 31.1 KG/M2 | HEIGHT: 69 IN | SYSTOLIC BLOOD PRESSURE: 124 MMHG | WEIGHT: 210 LBS | HEART RATE: 81 BPM

## 2024-12-13 DIAGNOSIS — N20.0 NEPHROLITHIASIS: ICD-10-CM

## 2024-12-13 DIAGNOSIS — N13.8 BPH WITH OBSTRUCTION/LOWER URINARY TRACT SYMPTOMS: ICD-10-CM

## 2024-12-13 DIAGNOSIS — N40.1 BPH WITH OBSTRUCTION/LOWER URINARY TRACT SYMPTOMS: ICD-10-CM

## 2024-12-13 DIAGNOSIS — Z12.5 PROSTATE CANCER SCREENING: Primary | ICD-10-CM

## 2024-12-13 PROCEDURE — 99213 OFFICE O/P EST LOW 20 MIN: CPT

## 2024-12-13 RX ORDER — TAMSULOSIN HYDROCHLORIDE 0.4 MG/1
0.8 CAPSULE ORAL
Qty: 180 CAPSULE | Refills: 3 | Status: SHIPPED | OUTPATIENT
Start: 2024-12-13

## 2024-12-13 NOTE — PROGRESS NOTES
Office Visit- Urology  Beny Jackson 1960 MRN: [id number]      Assessment/Discussion/Plan    64 y.o. male managed by        Gross hematuria  -See HPI below  -No smoking history, occupational exposure to potentially carcinogenic chemicals, or family history  -CT renal protocol demonstrated a nonobstructing 4 mm distal right ureteral calculus.   -No recurrence since stone removal     2.  Prostate cancer screening  -No known family history  -PSA returned at 0.9.  Due for updated PSA  -Patient defers prostate examination     3.  BPH with LUTS  -Patient bothered by his current urinary symptoms  -Patient utilizes Flomax 0.8 mg and this works well for symptoms.  Refills given     4.  Nephrolithiasis  -Status post ureteroscopy with Dr. Gonsalez on 10/18/2023  -Will obtain updated ultrasound imaging to reassess stone burden  -Recurrent stone formation/metabolic profile returns negative    Chief Complaint:   Beny is a 64 y.o. male presenting to the office for a follow up visit regarding nephrolithiasis        Subjective    Hx 5/20/2023  -62-year-old male with a past medical history significant for MI in 2020 status post coronary angioplasty with stent placement.  On aspirin  -No prior urologic history  -Patient presents the office for evaluation of gross hematuria  -Stated at that last week he had 2 episodes of blood within the urine and his urine was discolored a [employer] color he also notes that there is spots of maroon that he noticed within the toilet bowl.  No further episodes of hematuria.   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   - 4 mm stone in the distal right ureter that is not blocking his ureter that could very well be the cause of patient's gross hematuria.  -No known stone passage  -Patient remains asymptomatic  -No further episodes of gross hematuria     Hx 12/13/2024  Patient presents to the office today for follow-up he had a ureteroscopy with Dr. Gonsalez on 10/18/2023.  He presents for annual follow-up he states that Flomax of brain milligrams working well with urinary tract symptoms and he is off of this he has an acute exacerbation of his symptoms.  No further episodes of gross hematuria.  He denies any flank pain or dysuria    AUA SYMPTOM SCORE      Flowsheet Row Most Recent Value   AUA SYMPTOM SCORE    How often have you had a sensation of not emptying your bladder completely after you finished urinating? 0 (P)     How often have you had to urinate again less than two hours after you finished urinating? 1 (P)     How often have you found you stopped and started again several times when you urinate? 1 (P)     How often have you found it difficult to postpone urination? 1 (P)     How often have you had a weak urinary stream? 1 (P)     How often have you had to push or strain to begin urination? 0 (P)     How many times did you most typically get up to urinate from the time you went to bed at night until the time you got up in the morning? 5 (P)     Quality of Life: If you were to spend the rest of your life with your urinary condition just the way it is now, how would you feel about that? 1 (P)     AUA SYMPTOM SCORE 9 (P)              ROS:   Review of Systems   Constitutional: Negative.  Negative  for chills, fatigue and fever.   HENT: Negative.     Respiratory:  Negative for shortness of breath.    Cardiovascular:  Negative for chest pain.   Gastrointestinal: Negative.  Negative for abdominal pain.   Endocrine: Negative.    Musculoskeletal: Negative.    Skin: Negative.    Neurological: Negative.  Negative for dizziness and light-headedness.   Hematological: Negative.    Psychiatric/Behavioral: Negative.           Past Medical History  Past Medical History:   Diagnosis Date    Cancer (HCC)     basal cell- facial cheek, chest    Coronary artery disease     Decreased hearing     bilateral    Dry eyes     Heart attack (HCC) 2020    1 stent    Hematuria     Kidney stone     Myocardial infarction (HCC)     2020-one stent    Spinal stenosis     Wears glasses        Past Surgical History  Past Surgical History:   Procedure Laterality Date    COLONOSCOPY      CORONARY ANGIOPLASTY WITH STENT PLACEMENT  2020    FL RETROGRADE PYELOGRAM  10/18/2023    HERNIA REPAIR      umbilical, inguinal    LIPOMA RESECTION      right shoulder    [place] CYSTO/URETERO W/LITHOTRIPSY &INDWELL STENT INSRT Right 10/18/2023    Procedure: CYSTOSCOPY URETEROSCOPY WITH LITHOTRIPSY HOLMIUM LASER, RETROGRADE PYELOGRAM AND INSERTION STENT URETERAL, URETHERAL DILATION;  Surgeon: Pérez Gonsalez MD;  Location: [facility] MAIN OR;  Service: Urology       Past Family History  Family History   Problem Relation Age of Onset    Dementia Mother     Diabetes Father     Peripheral vascular disease Father     Diabetes Sister     Diabetes Brother        Past Social history  Social History     Socioeconomic History    Marital status:      Spouse name: Not on file    Number of children: Not on file    Years of education: Not on file    Highest education level: Not on file   Occupational History    Not on file   Tobacco Use    Smoking status: Never    Smokeless tobacco: Never   Vaping Use    Vaping status: Never Used   Substance and Sexual Activity    Alcohol use:  "Yes     Alcohol/week: 3.0 standard drinks of alcohol     Types: 3 Glasses of wine per week     Comment: socially     Drug use: Yes     Types: Marijuana     Comment: Gummies to help him sleep    Sexual activity: Yes     Partners: Female     Birth control/protection: Post-menopausal, None   Other Topics Concern    Not on file   Social History Narrative    Not on file     Social Drivers of Health     Financial Resource Strain: Not on file   Food Insecurity: Not on file   Transportation Needs: Not on file   Physical Activity: Not on file   Stress: Not on file   Social Connections: Not on file   Intimate Partner Violence: Not on file   Housing Stability: Not on file       Current Medications  Current Outpatient Medications   Medication Sig Dispense Refill    aspirin 81 mg chewable tablet Chew 2 tablets (162 mg total) daily 30 tablet 0    atorvastatin (LIPITOR) 80 mg tablet TAKE 1 TABLET BY MOUTH EVERY DAY WITH DINNER 30 tablet 0    nitroglycerin (NITROSTAT) 0.4 mg SL tablet Place 1 tablet (0.4 mg total) under the tongue every 5 (five) minutes as needed for chest pain 60 tablet 0    Restasis 0.05 % ophthalmic emulsion INSTILL 1 DROP IN BOTH EYES 2 TIMES DAILY      tamsulosin (FLOMAX) 0.4 mg Take 2 capsules (0.8 mg total) by mouth daily with dinner 60 capsule 0    traZODone (DESYREL) 100 mg tablet TAKE 1 TABLET BY MOUTH DAILY AT BEDTIME 30 tablet 5    Tyrvaya 0.03 MG/ACT SOLN 1 spray by Each Nare route 2 (two) times a day      metoprolol tartrate (LOPRESSOR) 25 mg tablet TAKE 1 TABLET (25 MG TOTAL) BY MOUTH EVERY 12 (TWELVE) HOURS (Patient not taking: Reported on 8/27/2024) 180 tablet 1     No current facility-administered medications for this visit.       Allergies  No Known Allergies    OBJECTIVE    Vitals   Vitals:    12/13/24 1358   BP: 124/80   BP Location: Left arm   Patient Position: Sitting   Cuff Size: Adult   Pulse: 81   SpO2: 98%   Weight: 95.3 kg (210 lb)   Height: 5' 9\" (1.753 m)       PVR:    Physical " Exam  Constitutional:       General: He is not in acute distress.     Appearance: Normal appearance. He is normal weight. He is not ill-appearing or toxic-appearing.   HENT:      Head: Normocephalic and atraumatic.   Eyes:      Conjunctiva/sclera: Conjunctivae normal.   Cardiovascular:      Rate and Rhythm: Normal rate.   Pulmonary:      Effort: Pulmonary effort is normal. No respiratory distress.   Skin:     General: Skin is warm and dry.   Neurological:      General: No focal deficit present.      Mental Status: He is alert and oriented to person, place, and time.      Cranial Nerves: No cranial nerve deficit.   Psychiatric:         Mood and Affect: Mood normal.         Behavior: Behavior normal.         Thought Content: Thought content normal.          Labs:     Lab Results   Component Value Date    PSA 0.90 05/26/2023     Lab Results   Component Value Date    CREATININE 0.91 11/26/2024      Lab Results   Component Value Date    HGBA1C 6.4 (H) 10/10/2023     Lab Results   Component Value Date    CALCIUM 9.1 11/26/2024    K 4.6 11/26/2024    CO2 30 11/26/2024     11/26/2024    BUN 15 11/26/2024    CREATININE 0.91 11/26/2024       I have personally reviewed all pertinent lab results and reviewed with patient    Imaging       Jarret De Guzman PA-C  Date: 12/13/2024 Time: 2:18 PM  [facility]    This note was written using fluency dictation software. Please excuse any resulting minor grammatical errors.

## 2024-12-17 DIAGNOSIS — I21.4 NSTEMI (NON-ST ELEVATION MYOCARDIAL INFARCTION) (HCC): ICD-10-CM

## 2024-12-18 DIAGNOSIS — H04.129 DRY EYE: Primary | ICD-10-CM

## 2024-12-18 DIAGNOSIS — F51.01 PRIMARY INSOMNIA: ICD-10-CM

## 2024-12-18 RX ORDER — CYCLOSPORINE 0.5 MG/ML
1 EMULSION OPHTHALMIC EVERY 12 HOURS
Qty: 0.4 ML | Refills: 5 | Status: SHIPPED | OUTPATIENT
Start: 2024-12-18

## 2024-12-18 RX ORDER — ATORVASTATIN CALCIUM 80 MG/1
80 TABLET, FILM COATED ORAL
Qty: 90 TABLET | Refills: 1 | Status: SHIPPED | OUTPATIENT
Start: 2024-12-18 | End: 2024-12-26 | Stop reason: SDUPTHER

## 2024-12-19 RX ORDER — TRAZODONE HYDROCHLORIDE 100 MG/1
100 TABLET ORAL
Qty: 30 TABLET | Refills: 0 | OUTPATIENT
Start: 2024-12-19

## 2024-12-26 ENCOUNTER — APPOINTMENT (OUTPATIENT)
Dept: RADIOLOGY | Facility: CLINIC | Age: 64
End: 2024-12-26
Payer: COMMERCIAL

## 2024-12-26 ENCOUNTER — OFFICE VISIT (OUTPATIENT)
Dept: URGENT CARE | Facility: CLINIC | Age: 64
End: 2024-12-26
Payer: COMMERCIAL

## 2024-12-26 VITALS
HEART RATE: 70 BPM | BODY MASS INDEX: 30.51 KG/M2 | TEMPERATURE: 96 F | SYSTOLIC BLOOD PRESSURE: 135 MMHG | OXYGEN SATURATION: 97 % | HEIGHT: 69 IN | RESPIRATION RATE: 18 BRPM | DIASTOLIC BLOOD PRESSURE: 88 MMHG | WEIGHT: 206 LBS

## 2024-12-26 DIAGNOSIS — I21.4 NSTEMI (NON-ST ELEVATION MYOCARDIAL INFARCTION) (HCC): ICD-10-CM

## 2024-12-26 DIAGNOSIS — J06.9 VIRAL URI WITH COUGH: Primary | ICD-10-CM

## 2024-12-26 DIAGNOSIS — J06.9 VIRAL URI WITH COUGH: ICD-10-CM

## 2024-12-26 PROCEDURE — 71046 X-RAY EXAM CHEST 2 VIEWS: CPT

## 2024-12-26 PROCEDURE — 99214 OFFICE O/P EST MOD 30 MIN: CPT | Performed by: FAMILY MEDICINE

## 2024-12-26 RX ORDER — PREDNISONE 20 MG/1
20 TABLET ORAL 2 TIMES DAILY WITH MEALS
Qty: 8 TABLET | Refills: 0 | Status: SHIPPED | OUTPATIENT
Start: 2024-12-26

## 2024-12-26 RX ORDER — BENZONATATE 200 MG/1
200 CAPSULE ORAL 3 TIMES DAILY PRN
Qty: 15 CAPSULE | Refills: 0 | Status: SHIPPED | OUTPATIENT
Start: 2024-12-26

## 2024-12-26 NOTE — TELEPHONE ENCOUNTER
Prescription sent to [facility] at the beginning of this month, but we have received a fax request for prescription to be sent to [facility].

## 2024-12-26 NOTE — PROGRESS NOTES
[facility]        NAME: Beny Jackson is a 64 y.o. male  : 1960    MRN: [id number]  DATE: 2024  TIME: 6:06 PM    Assessment and Plan   Viral URI with cough [J06.9]  1. Viral URI with cough  XR chest pa and lateral    predniSONE 20 mg tablet    benzonatate (TESSALON) 200 MG capsule            Patient Instructions     Steroids given for congestion. Negative chest XR. No signs of bacterial infection today. Follow up with PCP in 3-5 days if no improvement. Proceed to ER if symptoms worsen.    Chief Complaint     Chief Complaint   Patient presents with   • viral illness     Headache, cough, congestion x 11 days used Advil and sudafed         History of Present Illness     Beny Jackson is a 64 y.o. male presenting to the office today for upper respiratory complaints. Symptoms have been present for 11 days, and include cough and congestion. He has tried Sudafed with no improvement.       Review of Systems     Review of Systems   Constitutional:  Negative for chills, fatigue and fever.   HENT:  Positive for congestion. Negative for ear discharge, ear pain, postnasal drip, sinus pressure, sinus pain and sore throat.    Eyes:  Negative for pain and discharge.   Respiratory:  Positive for cough. Negative for shortness of breath.    Cardiovascular:  Negative for chest pain and palpitations.   Gastrointestinal:  Negative for abdominal pain, diarrhea, nausea and vomiting.   Genitourinary:  Negative for difficulty urinating and dysuria.   Musculoskeletal:  Negative for arthralgias and myalgias.   Skin:  Negative for rash.   Neurological:  Negative for dizziness, syncope, light-headedness, numbness and headaches.   Psychiatric/Behavioral:  Negative for agitation.    All other systems reviewed and are negative.      Current Medications       Current Outpatient Medications:   •  aspirin 81 mg chewable tablet, Chew 2 tablets (162 mg total) daily, Disp: 30 tablet, Rfl: 0  •  atorvastatin (LIPITOR) 80 mg  tablet, TAKE 1 TABLET BY MOUTH EVERY DAY WITH DINNER, Disp: 90 tablet, Rfl: 1  •  benzonatate (TESSALON) 200 MG capsule, Take 1 capsule (200 mg total) by mouth 3 (three) times a day as needed for cough, Disp: 15 capsule, Rfl: 0  •  predniSONE 20 mg tablet, Take 1 tablet (20 mg total) by mouth 2 (two) times a day with meals, Disp: 8 tablet, Rfl: 0  •  Restasis 0.05 % ophthalmic emulsion, Administer 1 drop to both eyes every 12 (twelve) hours, Disp: 0.4 mL, Rfl: 5  •  tamsulosin (FLOMAX) 0.4 mg, Take 2 capsules (0.8 mg total) by mouth daily with dinner, Disp: 180 capsule, Rfl: 3  •  traZODone (DESYREL) 100 mg tablet, TAKE 1 TABLET BY MOUTH DAILY AT BEDTIME, Disp: 30 tablet, Rfl: 5  •  metoprolol tartrate (LOPRESSOR) 25 mg tablet, TAKE 1 TABLET (25 MG TOTAL) BY MOUTH EVERY 12 (TWELVE) HOURS (Patient not taking: Reported on 12/26/2024), Disp: 180 tablet, Rfl: 1  •  nitroglycerin (NITROSTAT) 0.4 mg SL tablet, Place 1 tablet (0.4 mg total) under the tongue every 5 (five) minutes as needed for chest pain (Patient not taking: Reported on 12/26/2024), Disp: 60 tablet, Rfl: 0  •  Tyrvaya 0.03 MG/ACT SOLN, 1 spray by Each Nare route 2 (two) times a day (Patient not taking: Reported on 12/26/2024), Disp: , Rfl:     Current Allergies     Allergies as of 12/26/2024   • (No Known Allergies)            The following portions of the patient's history were reviewed and updated as appropriate: allergies, current medications, past family history, past medical history, past social history, past surgical history and problem list.     Past Medical History:   Diagnosis Date   • Cancer (HCC)     basal cell- facial cheek, chest   • Coronary artery disease    • Decreased hearing     bilateral   • Dry eyes    • Heart attack (HCC) 2020 1 stent   • Hematuria    • Kidney stone    • Myocardial infarction (HCC)     2020-one stent   • Spinal stenosis    • Wears glasses        Past Surgical History:   Procedure Laterality Date   • COLONOSCOPY    "  • CORONARY ANGIOPLASTY WITH STENT PLACEMENT  2020 • [place] RETROGRADE PYELOGRAM  10/18/2023   • HERNIA REPAIR      umbilical, inguinal   • LIPOMA RESECTION      right shoulder   • [place] CYSTO/URETERO W/LITHOTRIPSY &INDWELL STENT INSRT Right 10/18/2023    Procedure: CYSTOSCOPY URETEROSCOPY WITH LITHOTRIPSY HOLMIUM LASER, RETROGRADE PYELOGRAM AND INSERTION STENT URETERAL, URETHERAL DILATION;  Surgeon: Pérez Gonsalez MD;  [facility];  Service: Urology       Family History   Problem Relation Age of Onset   • Dementia Mother    • Diabetes Father    • Peripheral vascular disease Father    • Diabetes Sister    • Diabetes Brother        Medications have been verified.    Objective     /88   Pulse 70   Temp (!) 96 °F (35.6 °C)   Resp 18   Ht 5' 9\" (1.753 m)   Wt 93.4 kg (206 lb)   SpO2 97%   BMI 30.42 kg/m²   No LMP for male patient.     Physical Exam     Physical Exam  Vitals reviewed.   Constitutional:       General: He is not in acute distress.     Appearance: Normal appearance. He is not ill-appearing.   HENT:      Head: Normocephalic and atraumatic.      Right Ear: Ear canal normal. A middle ear effusion is present.      Left Ear: Ear canal normal. A middle ear effusion is present.      Mouth/Throat:      Mouth: Mucous membranes are moist.      Pharynx: Postnasal drip present. No oropharyngeal exudate.      Tonsils: No tonsillar exudate.   Eyes:      Extraocular Movements: Extraocular movements intact.      Conjunctiva/sclera: Conjunctivae normal.      Pupils: Pupils are equal, round, and reactive to light.   Cardiovascular:      Rate and Rhythm: Normal rate and regular rhythm.      Pulses: Normal pulses.      Heart sounds: Normal heart sounds. No murmur heard.  Pulmonary:      Effort: Pulmonary effort is normal. No respiratory distress.      Breath sounds: Transmitted upper airway sounds present. No wheezing.   Musculoskeletal:      Cervical back: Normal range of motion and neck supple. No " tenderness.   Skin:     General: Skin is warm.   Neurological:      General: No focal deficit present.      Mental Status: He is alert.   Psychiatric:         Mood and Affect: Mood normal.         Behavior: Behavior normal.         Judgment: Judgment normal.       Negative Chest XR.

## 2024-12-27 RX ORDER — ATORVASTATIN CALCIUM 80 MG/1
80 TABLET, FILM COATED ORAL
Qty: 90 TABLET | Refills: 1 | Status: SHIPPED | OUTPATIENT
Start: 2024-12-27

## 2025-01-14 ENCOUNTER — TELEPHONE (OUTPATIENT)
Dept: SLEEP CENTER | Facility: CLINIC | Age: 65
End: 2025-01-14

## 2025-01-15 NOTE — TELEPHONE ENCOUNTER
Form from [facility] received for Dr. Vance to complete and sign. Form with note placed in Dr. Vances box.

## 2025-01-17 ENCOUNTER — HOSPITAL ENCOUNTER (OUTPATIENT)
Dept: RADIOLOGY | Facility: HOSPITAL | Age: 65
Discharge: HOME | End: 2025-01-17
Payer: COMMERCIAL

## 2025-01-17 DIAGNOSIS — N20.0 NEPHROLITHIASIS: ICD-10-CM

## 2025-01-17 PROCEDURE — 76775 US EXAM ABDO BACK WALL LIM: CPT

## 2025-01-17 NOTE — TELEPHONE ENCOUNTER
Call to patient voicemail message left voicemail message and sent [employer] message with return call number [phone] Option 3, then Option 1.

## 2025-01-23 DIAGNOSIS — F51.01 PRIMARY INSOMNIA: ICD-10-CM

## 2025-01-23 RX ORDER — TRAZODONE HYDROCHLORIDE 100 MG/1
100 TABLET ORAL
Qty: 30 TABLET | Refills: 5 | Status: SHIPPED | OUTPATIENT
Start: 2025-01-23

## 2025-01-23 NOTE — TELEPHONE ENCOUNTER
Request for medication refill due to change of pharmacy.    Last office visit 8/27/2024.  Next office visit 3/25/2025.    Dr. Vance, please review Rx request and sign if appropriate.  Thank you.

## 2025-01-24 ENCOUNTER — RESULTS FOLLOW-UP (OUTPATIENT)
Dept: UROLOGY | Facility: CLINIC | Age: 65
End: 2025-01-24

## 2025-01-24 NOTE — TELEPHONE ENCOUNTER
Spoke to PT relayed that ultrasound with evidence of a 8 mm left upper pole stone. Not causing any blockage. Patient have a follow-up in December 2025 would keep that APT.         ----- Message from Jarret De Guzman PA-C sent at 1/24/2025  1:40 PM EST -----  Ultrasound with evidence of a 8 mm left upper pole stone.  Not causing any blockage.  Patient have a follow-up in December 2025

## 2025-05-22 DIAGNOSIS — I21.4 NSTEMI (NON-ST ELEVATION MYOCARDIAL INFARCTION) (HCC): ICD-10-CM

## 2025-05-22 RX ORDER — ATORVASTATIN CALCIUM 80 MG/1
80 TABLET, FILM COATED ORAL
Qty: 90 TABLET | Refills: 1 | Status: SHIPPED | OUTPATIENT
Start: 2025-05-22

## 2025-06-06 DIAGNOSIS — F51.01 PRIMARY INSOMNIA: ICD-10-CM

## 2025-06-09 RX ORDER — TRAZODONE HYDROCHLORIDE 100 MG/1
100 TABLET ORAL
Qty: 30 TABLET | Refills: 11 | Status: SHIPPED | OUTPATIENT
Start: 2025-06-09

## 2025-06-09 NOTE — TELEPHONE ENCOUNTER
Last office visit 8/27/2025.  Next office visit 7/28/2025.    Dr. Vance, please review Rx request and sign if appropriate.  Thank you.

## (undated) DEVICE — GLOVE SRG BIOGEL 7.5

## (undated) DEVICE — ENDOSCOPIC VALVE WITH ADAPTER.: Brand: SURSEAL® II

## (undated) DEVICE — BASKET SPECIMEN RETRIVAL 1.9FR 120CM

## (undated) DEVICE — FIBER STD QUANTA 272 MICRON

## (undated) DEVICE — CHLORHEXIDINE 4PCT 4 OZ

## (undated) DEVICE — SPECIMEN CONTAINER STERILE PEEL PACK

## (undated) DEVICE — INVIEW CLEAR LEGGINGS: Brand: CONVERTORS

## (undated) DEVICE — PACK TUR

## (undated) DEVICE — GUIDEWIRE STRGHT TIP 0.035 IN  SOLO PLUS

## (undated) DEVICE — PREMIUM DRY TRAY LF: Brand: MEDLINE INDUSTRIES, INC.

## (undated) DEVICE — STERILE SURGICAL LUBRICANT,  TUBE: Brand: SURGILUBE

## (undated) DEVICE — UROLOGIC DRAIN BAG: Brand: UNBRANDED